# Patient Record
Sex: MALE | Race: WHITE | NOT HISPANIC OR LATINO
[De-identification: names, ages, dates, MRNs, and addresses within clinical notes are randomized per-mention and may not be internally consistent; named-entity substitution may affect disease eponyms.]

---

## 2017-07-19 ENCOUNTER — APPOINTMENT (OUTPATIENT)
Dept: INTERNAL MEDICINE | Facility: CLINIC | Age: 82
End: 2017-07-19

## 2017-08-24 ENCOUNTER — TRANSCRIPTION ENCOUNTER (OUTPATIENT)
Age: 82
End: 2017-08-24

## 2017-10-08 ENCOUNTER — EMERGENCY (EMERGENCY)
Facility: HOSPITAL | Age: 82
LOS: 1 days | Discharge: HOME | End: 2017-10-08
Attending: EMERGENCY MEDICINE
Payer: MEDICARE

## 2017-10-08 ENCOUNTER — TRANSCRIPTION ENCOUNTER (OUTPATIENT)
Age: 82
End: 2017-10-08

## 2017-10-08 VITALS
RESPIRATION RATE: 18 BRPM | OXYGEN SATURATION: 98 % | TEMPERATURE: 94 F | HEART RATE: 74 BPM | DIASTOLIC BLOOD PRESSURE: 80 MMHG | SYSTOLIC BLOOD PRESSURE: 174 MMHG

## 2017-10-08 VITALS
OXYGEN SATURATION: 100 % | TEMPERATURE: 98 F | WEIGHT: 160.06 LBS | DIASTOLIC BLOOD PRESSURE: 74 MMHG | HEART RATE: 89 BPM | HEIGHT: 69 IN | SYSTOLIC BLOOD PRESSURE: 168 MMHG | RESPIRATION RATE: 18 BRPM

## 2017-10-08 DIAGNOSIS — Z95.2 PRESENCE OF PROSTHETIC HEART VALVE: Chronic | ICD-10-CM

## 2017-10-08 DIAGNOSIS — Z98.49 CATARACT EXTRACTION STATUS, UNSPECIFIED EYE: Chronic | ICD-10-CM

## 2017-10-08 LAB
ANION GAP SERPL CALC-SCNC: 14 MMOL/L — SIGNIFICANT CHANGE UP (ref 5–17)
APPEARANCE UR: CLEAR — SIGNIFICANT CHANGE UP
BILIRUB UR-MCNC: NEGATIVE — SIGNIFICANT CHANGE UP
BUN SERPL-MCNC: 19 MG/DL — SIGNIFICANT CHANGE UP (ref 8–20)
CALCIUM SERPL-MCNC: 8.9 MG/DL — SIGNIFICANT CHANGE UP (ref 8.6–10.2)
CHLORIDE SERPL-SCNC: 89 MMOL/L — LOW (ref 98–107)
CO2 SERPL-SCNC: 26 MMOL/L — SIGNIFICANT CHANGE UP (ref 22–29)
COLOR SPEC: YELLOW — SIGNIFICANT CHANGE UP
CREAT SERPL-MCNC: 0.77 MG/DL — SIGNIFICANT CHANGE UP (ref 0.5–1.3)
DIFF PNL FLD: NEGATIVE — SIGNIFICANT CHANGE UP
GLUCOSE SERPL-MCNC: 142 MG/DL — HIGH (ref 70–115)
GLUCOSE UR QL: NEGATIVE MG/DL — SIGNIFICANT CHANGE UP
HCT VFR BLD CALC: 40.6 % — LOW (ref 42–52)
HGB BLD-MCNC: 13.2 G/DL — LOW (ref 14–18)
KETONES UR-MCNC: NEGATIVE — SIGNIFICANT CHANGE UP
LEUKOCYTE ESTERASE UR-ACNC: NEGATIVE — SIGNIFICANT CHANGE UP
MCHC RBC-ENTMCNC: 28 PG — SIGNIFICANT CHANGE UP (ref 27–31)
MCHC RBC-ENTMCNC: 32.5 G/DL — SIGNIFICANT CHANGE UP (ref 32–36)
MCV RBC AUTO: 86 FL — SIGNIFICANT CHANGE UP (ref 80–94)
NITRITE UR-MCNC: NEGATIVE — SIGNIFICANT CHANGE UP
PH UR: 7 — SIGNIFICANT CHANGE UP (ref 5–8)
PLATELET # BLD AUTO: 204 K/UL — SIGNIFICANT CHANGE UP (ref 150–400)
POTASSIUM SERPL-MCNC: 4.7 MMOL/L — SIGNIFICANT CHANGE UP (ref 3.5–5.3)
POTASSIUM SERPL-SCNC: 4.7 MMOL/L — SIGNIFICANT CHANGE UP (ref 3.5–5.3)
PROT UR-MCNC: NEGATIVE MG/DL — SIGNIFICANT CHANGE UP
RBC # BLD: 4.72 M/UL — SIGNIFICANT CHANGE UP (ref 4.6–6.2)
RBC # FLD: 13.8 % — SIGNIFICANT CHANGE UP (ref 11–15.6)
RBC CASTS # UR COMP ASSIST: NEGATIVE /HPF — SIGNIFICANT CHANGE UP (ref 0–4)
SODIUM SERPL-SCNC: 129 MMOL/L — LOW (ref 135–145)
SP GR SPEC: 1.01 — SIGNIFICANT CHANGE UP (ref 1.01–1.02)
UROBILINOGEN FLD QL: NEGATIVE MG/DL — SIGNIFICANT CHANGE UP
WBC # BLD: 7.9 K/UL — SIGNIFICANT CHANGE UP (ref 4.8–10.8)
WBC # FLD AUTO: 7.9 K/UL — SIGNIFICANT CHANGE UP (ref 4.8–10.8)
WBC UR QL: NEGATIVE — SIGNIFICANT CHANGE UP

## 2017-10-08 PROCEDURE — 36415 COLL VENOUS BLD VENIPUNCTURE: CPT

## 2017-10-08 PROCEDURE — 99283 EMERGENCY DEPT VISIT LOW MDM: CPT | Mod: 25

## 2017-10-08 PROCEDURE — 81001 URINALYSIS AUTO W/SCOPE: CPT

## 2017-10-08 PROCEDURE — 74020: CPT | Mod: 26

## 2017-10-08 PROCEDURE — 85027 COMPLETE CBC AUTOMATED: CPT

## 2017-10-08 PROCEDURE — 99284 EMERGENCY DEPT VISIT MOD MDM: CPT

## 2017-10-08 PROCEDURE — 80048 BASIC METABOLIC PNL TOTAL CA: CPT

## 2017-10-08 PROCEDURE — 74020: CPT

## 2017-10-08 RX ORDER — SODIUM CHLORIDE 9 MG/ML
3 INJECTION INTRAMUSCULAR; INTRAVENOUS; SUBCUTANEOUS ONCE
Qty: 0 | Refills: 0 | Status: DISCONTINUED | OUTPATIENT
Start: 2017-10-08 | End: 2017-10-12

## 2017-10-08 NOTE — ED ADULT TRIAGE NOTE - CHIEF COMPLAINT QUOTE
Sent from [facility] for abdominal pain and constipation. r/o obstruction. Family reports giving suppository this morning and reports small BM. Denies nausea and vomiting at current moment in time. Denies CP and SOB. Denies fevers and chills.

## 2017-10-08 NOTE — ED PROVIDER NOTE - SHIFT CHANGE DETAILS
ABDOMINAL PAIN. CONSTIPATION WITH PARTIAL RELIEF AFTER SUPPOSITORY AT HOME. ALSO DECREASED URINE OUTPUT SECONDARY TO RETENTION. AWAITING LAB RESULTS. XRAYS NEG OBSTRUCTION.  DISPO. PROB DX URINE RETENTION , NEEDS LEG BAG AND UROLOGY F/U

## 2017-10-08 NOTE — ED ADULT NURSE NOTE - PMH
Anemia    Aortic stenosis    BPH (benign prostatic hyperplasia)    Depression    Endocarditis    Fatigue    GI bleed    HLD (hyperlipidemia)

## 2017-10-08 NOTE — ED PROVIDER NOTE - MEDICAL DECISION MAKING DETAILS
ABDOMINAL PAIN STARTED THIS MORNING. NO VOMITING OR DIARRHEA. + CONSTIPATION AND DECREASED URINATION. FOLLOWING LABS AND CLINICAL STATUS ABDOMINAL PAIN STARTED THIS MORNING. NO VOMITING OR DIARRHEA. + CONSTIPATION AND DECREASED URINATION. FOLLOWING LABS AND CLINICAL STATUS  IMPROVED AS PER DR BHANU SAENZ/VENESSA WILEY TOMORROW

## 2017-10-08 NOTE — ED PROVIDER NOTE - CHPI ED SYMPTOMS NEG
no blood in stool/no abdominal distension/no burning urination/no chills/no fever/no dysuria/no diarrhea

## 2017-10-08 NOTE — ED ADULT NURSE NOTE - CHIEF COMPLAINT QUOTE
Sent from [facility] for abdominal pain and constipation. r/o obstruction. Family reports giving suppository this morning and reports small BM. Denies nausea and vomiting at current moment in time. Denies CP and SOB. Denies fevers and chills.

## 2017-10-08 NOTE — ED PROVIDER NOTE - OBJECTIVE STATEMENT
PT C/O SUDDEN ONSET ABDOMINAL PAIN THIS MORNING. HAS HAD SOME CONSTIPATION. SOME RELIEF WITH A SUPPOSITORY. ALSO TROUBLE PASSING URINE AND HISTORY OF PROSTATE ISSUES. NO VOMITING. NO FEVER.

## 2017-10-08 NOTE — ED ADULT NURSE NOTE - OBJECTIVE STATEMENT
Assumed patient care at 1605.  Pt reports lower abdominal pain since 0700 today.  Last BM friday morning.  Abdomen soft, tenderness present to LLQ. Hypoactive bowel sounds.  Pt denies nausea at time of assessment.

## 2017-10-08 NOTE — ED ADULT NURSE NOTE - PSH
S/P cataract extraction and insertion of intraocular lens  bilat  S/P TAVR (transcatheter aortic valve replacement)

## 2017-10-26 ENCOUNTER — APPOINTMENT (OUTPATIENT)
Dept: UROLOGY | Facility: CLINIC | Age: 82
End: 2017-10-26
Payer: MEDICARE

## 2017-10-26 VITALS
WEIGHT: 158 LBS | SYSTOLIC BLOOD PRESSURE: 159 MMHG | HEART RATE: 61 BPM | BODY MASS INDEX: 25.39 KG/M2 | DIASTOLIC BLOOD PRESSURE: 67 MMHG | HEIGHT: 66 IN | TEMPERATURE: 97.9 F | RESPIRATION RATE: 17 BRPM

## 2017-10-26 PROCEDURE — 99214 OFFICE O/P EST MOD 30 MIN: CPT

## 2017-11-03 ENCOUNTER — OUTPATIENT (OUTPATIENT)
Dept: OUTPATIENT SERVICES | Facility: HOSPITAL | Age: 82
LOS: 1 days | End: 2017-11-03
Payer: MEDICARE

## 2017-11-03 VITALS
RESPIRATION RATE: 16 BRPM | HEIGHT: 66 IN | HEART RATE: 63 BPM | SYSTOLIC BLOOD PRESSURE: 120 MMHG | TEMPERATURE: 98 F | DIASTOLIC BLOOD PRESSURE: 62 MMHG | WEIGHT: 160.06 LBS

## 2017-11-03 DIAGNOSIS — I35.0 NONRHEUMATIC AORTIC (VALVE) STENOSIS: ICD-10-CM

## 2017-11-03 DIAGNOSIS — Z98.49 CATARACT EXTRACTION STATUS, UNSPECIFIED EYE: Chronic | ICD-10-CM

## 2017-11-03 DIAGNOSIS — N40.1 BENIGN PROSTATIC HYPERPLASIA WITH LOWER URINARY TRACT SYMPTOMS: ICD-10-CM

## 2017-11-03 DIAGNOSIS — N40.0 BENIGN PROSTATIC HYPERPLASIA WITHOUT LOWER URINARY TRACT SYMPTOMS: ICD-10-CM

## 2017-11-03 DIAGNOSIS — Z95.2 PRESENCE OF PROSTHETIC HEART VALVE: Chronic | ICD-10-CM

## 2017-11-03 LAB
APPEARANCE UR: SIGNIFICANT CHANGE UP
BILIRUB UR-MCNC: NEGATIVE — SIGNIFICANT CHANGE UP
BLD GP AB SCN SERPL QL: NEGATIVE — SIGNIFICANT CHANGE UP
BLOOD UR QL VISUAL: HIGH
BUN SERPL-MCNC: 21 MG/DL — SIGNIFICANT CHANGE UP (ref 7–23)
CALCIUM SERPL-MCNC: 8.3 MG/DL — LOW (ref 8.4–10.5)
CHLORIDE SERPL-SCNC: 94 MMOL/L — LOW (ref 98–107)
CO2 SERPL-SCNC: 27 MMOL/L — SIGNIFICANT CHANGE UP (ref 22–31)
COLOR SPEC: YELLOW — SIGNIFICANT CHANGE UP
CREAT SERPL-MCNC: 0.93 MG/DL — SIGNIFICANT CHANGE UP (ref 0.5–1.3)
GLUCOSE SERPL-MCNC: 128 MG/DL — HIGH (ref 70–99)
GLUCOSE UR-MCNC: NEGATIVE — SIGNIFICANT CHANGE UP
HCT VFR BLD CALC: 37.1 % — LOW (ref 39–50)
HGB BLD-MCNC: 12.2 G/DL — LOW (ref 13–17)
HYALINE CASTS # UR AUTO: SIGNIFICANT CHANGE UP (ref 0–?)
KETONES UR-MCNC: NEGATIVE — SIGNIFICANT CHANGE UP
LEUKOCYTE ESTERASE UR-ACNC: HIGH
MCHC RBC-ENTMCNC: 29 PG — SIGNIFICANT CHANGE UP (ref 27–34)
MCHC RBC-ENTMCNC: 32.9 % — SIGNIFICANT CHANGE UP (ref 32–36)
MCV RBC AUTO: 88.1 FL — SIGNIFICANT CHANGE UP (ref 80–100)
MUCOUS THREADS # UR AUTO: SIGNIFICANT CHANGE UP
NITRITE UR-MCNC: NEGATIVE — SIGNIFICANT CHANGE UP
NON-SQ EPI CELLS # UR AUTO: 2 — SIGNIFICANT CHANGE UP
NRBC # FLD: 0 — SIGNIFICANT CHANGE UP
PH UR: 6 — SIGNIFICANT CHANGE UP (ref 4.6–8)
PLATELET # BLD AUTO: 187 K/UL — SIGNIFICANT CHANGE UP (ref 150–400)
PMV BLD: 8.7 FL — SIGNIFICANT CHANGE UP (ref 7–13)
POTASSIUM SERPL-MCNC: 4.5 MMOL/L — SIGNIFICANT CHANGE UP (ref 3.5–5.3)
POTASSIUM SERPL-SCNC: 4.5 MMOL/L — SIGNIFICANT CHANGE UP (ref 3.5–5.3)
PROT UR-MCNC: 100 — SIGNIFICANT CHANGE UP
RBC # BLD: 4.21 M/UL — SIGNIFICANT CHANGE UP (ref 4.2–5.8)
RBC # FLD: 13.4 % — SIGNIFICANT CHANGE UP (ref 10.3–14.5)
RBC CASTS # UR COMP ASSIST: HIGH (ref 0–?)
RH IG SCN BLD-IMP: NEGATIVE — SIGNIFICANT CHANGE UP
SODIUM SERPL-SCNC: 134 MMOL/L — LOW (ref 135–145)
SP GR SPEC: 1.02 — SIGNIFICANT CHANGE UP (ref 1–1.03)
SQUAMOUS # UR AUTO: SIGNIFICANT CHANGE UP
UROBILINOGEN FLD QL: NORMAL E.U. — SIGNIFICANT CHANGE UP (ref 0.1–0.2)
WBC # BLD: 5.47 K/UL — SIGNIFICANT CHANGE UP (ref 3.8–10.5)
WBC # FLD AUTO: 5.47 K/UL — SIGNIFICANT CHANGE UP (ref 3.8–10.5)
WBC UR QL: >50 — HIGH (ref 0–?)

## 2017-11-03 PROCEDURE — 93010 ELECTROCARDIOGRAM REPORT: CPT

## 2017-11-03 RX ORDER — ASPIRIN/CALCIUM CARB/MAGNESIUM 324 MG
1 TABLET ORAL
Qty: 0 | Refills: 0 | COMMUNITY

## 2017-11-03 RX ORDER — MULTIVIT-MIN/FERROUS GLUCONATE 9 MG/15 ML
1 LIQUID (ML) ORAL
Qty: 0 | Refills: 0 | COMMUNITY

## 2017-11-03 RX ORDER — SODIUM CHLORIDE 9 MG/ML
1000 INJECTION, SOLUTION INTRAVENOUS
Qty: 0 | Refills: 0 | Status: DISCONTINUED | OUTPATIENT
Start: 2017-11-17 | End: 2017-11-17

## 2017-11-03 RX ORDER — MULTIVIT-MIN/FERROUS GLUCONATE 9 MG/15 ML
0 LIQUID (ML) ORAL
Qty: 0 | Refills: 0 | COMMUNITY

## 2017-11-03 RX ORDER — METOPROLOL TARTRATE 50 MG
12.5 TABLET ORAL
Qty: 0 | Refills: 1 | COMMUNITY

## 2017-11-03 NOTE — H&P PST ADULT - ASSESSMENT
89 yrs old male with h/o BPH and retention of urine, went to [facility] ER on Oct 2017 and Waddell's catheter was inserted, attempted to remove waddell twice but unable to do so. Pt presents for preop eval to have cystoscopy, and transurethral button vaporization of the prostate on 11/17/17

## 2017-11-03 NOTE — H&P PST ADULT - PROBLEM SELECTOR PLAN 1
Scheduled for cystoscopy, and transurethral button vaporization of the prostate on 11/17/17.  Labs pending,  EKG in chart,   Preop instructions provided to pt. Famotidine provided to pt with instructions.

## 2017-11-03 NOTE — H&P PST ADULT - HISTORY OF PRESENT ILLNESS
89 yrs old male with h/o BPH and retention of urine, went to [facility] ER on Oct 2017 and Waddell's catheter was inserted, attempted to remove waddell twice but unable to do so. Pt presents for preop eval to have cystoscopy, and transurethral button vaporization of the prostate on 11/17/17

## 2017-11-03 NOTE — H&P PST ADULT - PROBLEM SELECTOR PLAN 2
Pt had TAVR in 2016 and pt is on aspirin, Pt advised to continue aspirin by Dr. Barnes.  Cardiac clearance done by Dr. Hernandez - will obtain copy

## 2017-11-03 NOTE — H&P PST ADULT - MUSCULOSKELETAL
details… detailed exam no joint swelling/needs cane for  ambulation/decreased ROM/diminished strength/ROM intact

## 2017-11-03 NOTE — H&P PST ADULT - PMH
Anemia    Aortic stenosis    BPH (benign prostatic hyperplasia)    Depression    Endocarditis  2013  Fatigue    GI bleed    HLD (hyperlipidemia)    Macular degeneration    Narcolepsy    UTI (urinary tract infection)  2013

## 2017-11-03 NOTE — H&P PST ADULT - GENERAL GENITOURINARY SYMPTOMS
unable to void in Oct 2017 and went to ER; pt had waddell to leg bag since then/urinary hesitancy/incontinence

## 2017-11-05 LAB
BACTERIA UR CULT: SIGNIFICANT CHANGE UP
SPECIMEN SOURCE: SIGNIFICANT CHANGE UP

## 2017-11-07 ENCOUNTER — MESSAGE (OUTPATIENT)
Age: 82
End: 2017-11-07

## 2017-11-09 ENCOUNTER — APPOINTMENT (OUTPATIENT)
Dept: INTERNAL MEDICINE | Facility: CLINIC | Age: 82
End: 2017-11-09

## 2017-11-16 RX ORDER — SODIUM CHLORIDE 9 MG/ML
1000 INJECTION, SOLUTION INTRAVENOUS
Qty: 0 | Refills: 0 | Status: DISCONTINUED | OUTPATIENT
Start: 2017-11-17 | End: 2017-11-17

## 2017-11-16 NOTE — ASU PATIENT PROFILE, ADULT - VISION (WITH CORRECTIVE LENSES IF THE PATIENT USUALLY WEARS THEM):
uses eye glasses/Partially impaired: cannot see medication labels or newsprint, but can see obstacles in path, and the surrounding layout; can count fingers at arm's length

## 2017-11-17 ENCOUNTER — TRANSCRIPTION ENCOUNTER (OUTPATIENT)
Age: 82
End: 2017-11-17

## 2017-11-17 ENCOUNTER — INPATIENT (INPATIENT)
Facility: HOSPITAL | Age: 82
LOS: 1 days | Discharge: HOME | End: 2017-11-19
Attending: UROLOGY | Admitting: UROLOGY

## 2017-11-17 ENCOUNTER — APPOINTMENT (OUTPATIENT)
Dept: UROLOGY | Facility: HOSPITAL | Age: 82
End: 2017-11-17
Payer: MEDICARE

## 2017-11-17 VITALS
OXYGEN SATURATION: 95 % | DIASTOLIC BLOOD PRESSURE: 66 MMHG | RESPIRATION RATE: 16 BRPM | HEIGHT: 66 IN | TEMPERATURE: 98 F | SYSTOLIC BLOOD PRESSURE: 145 MMHG | HEART RATE: 55 BPM | WEIGHT: 160.06 LBS

## 2017-11-17 DIAGNOSIS — Z95.2 PRESENCE OF PROSTHETIC HEART VALVE: Chronic | ICD-10-CM

## 2017-11-17 DIAGNOSIS — Z98.49 CATARACT EXTRACTION STATUS, UNSPECIFIED EYE: Chronic | ICD-10-CM

## 2017-11-17 DIAGNOSIS — N40.1 BENIGN PROSTATIC HYPERPLASIA WITH LOWER URINARY TRACT SYMPTOMS: ICD-10-CM

## 2017-11-17 PROCEDURE — 52317 REMOVE BLADDER STONE: CPT

## 2017-11-17 PROCEDURE — 52601 PROSTATECTOMY (TURP): CPT

## 2017-11-17 RX ORDER — MODAFINIL 200 MG/1
200 TABLET ORAL DAILY
Qty: 0 | Refills: 0 | Status: DISCONTINUED | OUTPATIENT
Start: 2017-11-17 | End: 2017-11-19

## 2017-11-17 RX ORDER — ONDANSETRON 8 MG/1
4 TABLET, FILM COATED ORAL ONCE
Qty: 0 | Refills: 0 | Status: DISCONTINUED | OUTPATIENT
Start: 2017-11-17 | End: 2017-11-17

## 2017-11-17 RX ORDER — SODIUM CHLORIDE 9 MG/ML
1000 INJECTION, SOLUTION INTRAVENOUS
Qty: 0 | Refills: 0 | Status: DISCONTINUED | OUTPATIENT
Start: 2017-11-17 | End: 2017-11-19

## 2017-11-17 RX ORDER — LABETALOL HCL 100 MG
10 TABLET ORAL ONCE
Qty: 0 | Refills: 0 | Status: COMPLETED | OUTPATIENT
Start: 2017-11-17 | End: 2017-11-17

## 2017-11-17 RX ORDER — OXYCODONE AND ACETAMINOPHEN 5; 325 MG/1; MG/1
1 TABLET ORAL EVERY 4 HOURS
Qty: 0 | Refills: 0 | Status: DISCONTINUED | OUTPATIENT
Start: 2017-11-17 | End: 2017-11-19

## 2017-11-17 RX ORDER — HEPARIN SODIUM 5000 [USP'U]/ML
5000 INJECTION INTRAVENOUS; SUBCUTANEOUS EVERY 8 HOURS
Qty: 0 | Refills: 0 | Status: DISCONTINUED | OUTPATIENT
Start: 2017-11-17 | End: 2017-11-19

## 2017-11-17 RX ORDER — PIPERACILLIN AND TAZOBACTAM 4; .5 G/20ML; G/20ML
3.38 INJECTION, POWDER, LYOPHILIZED, FOR SOLUTION INTRAVENOUS EVERY 8 HOURS
Qty: 0 | Refills: 0 | Status: DISCONTINUED | OUTPATIENT
Start: 2017-11-17 | End: 2017-11-19

## 2017-11-17 RX ORDER — METOPROLOL TARTRATE 50 MG
25 TABLET ORAL
Qty: 0 | Refills: 0 | Status: DISCONTINUED | OUTPATIENT
Start: 2017-11-17 | End: 2017-11-19

## 2017-11-17 RX ORDER — HYDROMORPHONE HYDROCHLORIDE 2 MG/ML
0.25 INJECTION INTRAMUSCULAR; INTRAVENOUS; SUBCUTANEOUS
Qty: 0 | Refills: 0 | Status: DISCONTINUED | OUTPATIENT
Start: 2017-11-17 | End: 2017-11-17

## 2017-11-17 RX ORDER — ACETAMINOPHEN 500 MG
650 TABLET ORAL EVERY 6 HOURS
Qty: 0 | Refills: 0 | Status: DISCONTINUED | OUTPATIENT
Start: 2017-11-17 | End: 2017-11-19

## 2017-11-17 RX ORDER — ATROPA BELLADONNA AND OPIUM 16.2; 6 MG/1; MG/1
1 SUPPOSITORY RECTAL ONCE
Qty: 0 | Refills: 0 | Status: DISCONTINUED | OUTPATIENT
Start: 2017-11-17 | End: 2017-11-17

## 2017-11-17 RX ORDER — SENNA PLUS 8.6 MG/1
1 TABLET ORAL AT BEDTIME
Qty: 0 | Refills: 0 | Status: DISCONTINUED | OUTPATIENT
Start: 2017-11-17 | End: 2017-11-19

## 2017-11-17 RX ORDER — DOCUSATE SODIUM 100 MG
100 CAPSULE ORAL THREE TIMES A DAY
Qty: 0 | Refills: 0 | Status: DISCONTINUED | OUTPATIENT
Start: 2017-11-17 | End: 2017-11-19

## 2017-11-17 RX ORDER — OXYCODONE AND ACETAMINOPHEN 5; 325 MG/1; MG/1
2 TABLET ORAL EVERY 6 HOURS
Qty: 0 | Refills: 0 | Status: DISCONTINUED | OUTPATIENT
Start: 2017-11-17 | End: 2017-11-19

## 2017-11-17 RX ORDER — FINASTERIDE 5 MG/1
5 TABLET, FILM COATED ORAL DAILY
Qty: 0 | Refills: 0 | Status: DISCONTINUED | OUTPATIENT
Start: 2017-11-17 | End: 2017-11-19

## 2017-11-17 RX ORDER — INFLUENZA VIRUS VACCINE 15; 15; 15; 15 UG/.5ML; UG/.5ML; UG/.5ML; UG/.5ML
0.5 SUSPENSION INTRAMUSCULAR ONCE
Qty: 0 | Refills: 0 | Status: COMPLETED | OUTPATIENT
Start: 2017-11-17 | End: 2017-11-19

## 2017-11-17 RX ADMIN — Medication 10 MILLIGRAM(S): at 11:07

## 2017-11-17 RX ADMIN — PIPERACILLIN AND TAZOBACTAM 25 GRAM(S): 4; .5 INJECTION, POWDER, LYOPHILIZED, FOR SOLUTION INTRAVENOUS at 21:38

## 2017-11-17 RX ADMIN — MODAFINIL 200 MILLIGRAM(S): 200 TABLET ORAL at 14:19

## 2017-11-17 RX ADMIN — HEPARIN SODIUM 5000 UNIT(S): 5000 INJECTION INTRAVENOUS; SUBCUTANEOUS at 21:38

## 2017-11-17 RX ADMIN — ATROPA BELLADONNA AND OPIUM 1 SUPPOSITORY(S): 16.2; 6 SUPPOSITORY RECTAL at 17:35

## 2017-11-17 RX ADMIN — HEPARIN SODIUM 5000 UNIT(S): 5000 INJECTION INTRAVENOUS; SUBCUTANEOUS at 14:20

## 2017-11-17 RX ADMIN — PIPERACILLIN AND TAZOBACTAM 25 GRAM(S): 4; .5 INJECTION, POWDER, LYOPHILIZED, FOR SOLUTION INTRAVENOUS at 14:25

## 2017-11-17 RX ADMIN — Medication 25 MILLIGRAM(S): at 17:35

## 2017-11-17 RX ADMIN — Medication 100 MILLIGRAM(S): at 21:38

## 2017-11-17 RX ADMIN — SENNA PLUS 1 TABLET(S): 8.6 TABLET ORAL at 21:38

## 2017-11-17 RX ADMIN — Medication 100 MILLIGRAM(S): at 14:19

## 2017-11-17 RX ADMIN — FINASTERIDE 5 MILLIGRAM(S): 5 TABLET, FILM COATED ORAL at 14:19

## 2017-11-17 NOTE — BRIEF OPERATIVE NOTE - PROCEDURE
<<-----Click on this checkbox to enter Procedure Cystoscopy, with TURP, using button electrode  11/17/2017    Active  ANG5  Cystolitholapaxy  11/17/2017    Active  ANG5

## 2017-11-17 NOTE — ASU PREOP CHECKLIST - COMMENTS
famotidine lopressor flomax metoprolol  at 4 30 am famotidine lopressor flomax metoprolol proscar at 4 30 am

## 2017-11-17 NOTE — PROGRESS NOTE ADULT - SUBJECTIVE AND OBJECTIVE BOX
Post op Check    Pt seen and examined without complaints. Pain is controlled. Denies SOB/CP/N/V.     Vital Signs Last 24 Hrs  T(C): 36.5 (17 Nov 2017 13:05), Max: 37 (17 Nov 2017 10:15)  T(F): 97.7 (17 Nov 2017 13:05), Max: 98.6 (17 Nov 2017 10:15)  HR: 64 (17 Nov 2017 13:05) (55 - 70)  BP: 147/66 (17 Nov 2017 13:05) (145/66 - 174/69)  BP(mean): --  RR: 16 (17 Nov 2017 13:05) (12 - 17)  SpO2: 95% (17 Nov 2017 13:05) (95% - 99%)    I&O's Summary      Physical Exam  Gen: NAD, A&Ox3  Abd: Soft, NT, ND  : +waddell draining clear with occasional transient red, on moderate gtt CBI. +traction                 A/P: 89y Male s/p TURP and cystolitholapaxy   DVT prophylaxis/OOB  Incentive spirometry  CBI overnight  d/c traction tonight   continue abx  Analgesia and antiemetics as needed  regular Diet  AM labs

## 2017-11-18 DIAGNOSIS — N40.0 BENIGN PROSTATIC HYPERPLASIA WITHOUT LOWER URINARY TRACT SYMPTOMS: ICD-10-CM

## 2017-11-18 LAB
BACTERIA UR CULT: SIGNIFICANT CHANGE UP
SPECIMEN SOURCE: SIGNIFICANT CHANGE UP

## 2017-11-18 RX ADMIN — PIPERACILLIN AND TAZOBACTAM 25 GRAM(S): 4; .5 INJECTION, POWDER, LYOPHILIZED, FOR SOLUTION INTRAVENOUS at 06:12

## 2017-11-18 RX ADMIN — Medication 100 MILLIGRAM(S): at 06:13

## 2017-11-18 RX ADMIN — Medication 25 MILLIGRAM(S): at 18:18

## 2017-11-18 RX ADMIN — Medication 25 MILLIGRAM(S): at 06:13

## 2017-11-18 RX ADMIN — HEPARIN SODIUM 5000 UNIT(S): 5000 INJECTION INTRAVENOUS; SUBCUTANEOUS at 14:58

## 2017-11-18 RX ADMIN — PIPERACILLIN AND TAZOBACTAM 25 GRAM(S): 4; .5 INJECTION, POWDER, LYOPHILIZED, FOR SOLUTION INTRAVENOUS at 14:57

## 2017-11-18 RX ADMIN — MODAFINIL 200 MILLIGRAM(S): 200 TABLET ORAL at 12:56

## 2017-11-18 RX ADMIN — FINASTERIDE 5 MILLIGRAM(S): 5 TABLET, FILM COATED ORAL at 12:52

## 2017-11-18 RX ADMIN — Medication 100 MILLIGRAM(S): at 14:58

## 2017-11-18 RX ADMIN — SENNA PLUS 1 TABLET(S): 8.6 TABLET ORAL at 21:28

## 2017-11-18 RX ADMIN — Medication 100 MILLIGRAM(S): at 21:28

## 2017-11-18 RX ADMIN — HEPARIN SODIUM 5000 UNIT(S): 5000 INJECTION INTRAVENOUS; SUBCUTANEOUS at 21:28

## 2017-11-18 RX ADMIN — SODIUM CHLORIDE 75 MILLILITER(S): 9 INJECTION, SOLUTION INTRAVENOUS at 21:28

## 2017-11-18 RX ADMIN — PIPERACILLIN AND TAZOBACTAM 25 GRAM(S): 4; .5 INJECTION, POWDER, LYOPHILIZED, FOR SOLUTION INTRAVENOUS at 21:28

## 2017-11-18 RX ADMIN — HEPARIN SODIUM 5000 UNIT(S): 5000 INJECTION INTRAVENOUS; SUBCUTANEOUS at 06:12

## 2017-11-18 NOTE — PROGRESS NOTE ADULT - SUBJECTIVE AND OBJECTIVE BOX
STATUS POST: button vaporization prostate. Cystolithalopaxy     POST OPERATIVE DAY #: 1    SUBJECTIVE:  Flatus: [x ] YES [ ] NO             Bowel Movement: [ ] YES [ x] NO  Pain (0-10):     5       Pain Control Adequate: [ x] YES [ ] NO  Nausea: [ ] YES [x ] NO            Vomiting: [ ] YES [x ] NO  Diarrhea: [ ] YES [x ] NO         Constipation: [ ] YES [x] NO     Chest Pain: [ ] YES [x] NO    SOB:  [ ] YES [x ] NO    MEDICATIONS  (STANDING):  docusate sodium 100 milliGRAM(s) Oral three times a day  finasteride 5 milliGRAM(s) Oral daily  heparin  Injectable 5000 Unit(s) SubCutaneous every 8 hours  influenza   Vaccine 0.5 milliLiter(s) IntraMuscular once  lactated ringers. 1000 milliLiter(s) (75 mL/Hr) IV Continuous <Continuous>  metoprolol     tartrate 25 milliGRAM(s) Oral two times a day  modafinil 200 milliGRAM(s) Oral daily  piperacillin/tazobactam IVPB. 3.375 Gram(s) IV Intermittent every 8 hours  senna 1 Tablet(s) Oral at bedtime    MEDICATIONS  (PRN):  acetaminophen   Tablet. 650 milliGRAM(s) Oral every 6 hours PRN Mild Pain (1 - 3)  oxyCODONE    5 mG/acetaminophen 325 mG 1 Tablet(s) Oral every 4 hours PRN Moderate Pain  oxyCODONE    5 mG/acetaminophen 325 mG 2 Tablet(s) Oral every 6 hours PRN Severe Pain      Vital Signs Last 24 Hrs  T(C): 36.5 (18 Nov 2017 06:07), Max: 37 (17 Nov 2017 10:15)  T(F): 97.7 (18 Nov 2017 06:07), Max: 98.6 (17 Nov 2017 10:15)  HR: 63 (18 Nov 2017 06:07) (61 - 73)  BP: 159/58 (18 Nov 2017 06:07) (114/57 - 174/69)  BP(mean): --  RR: 17 (18 Nov 2017 06:07) (12 - 17)  SpO2: 96% (18 Nov 2017 06:07) (95% - 99%)    PHYSICAL EXAM:    Appears in NAD  ABD; Soft NT ND    LE's warm    CBI Clear.

## 2017-11-18 NOTE — PROGRESS NOTE ADULT - ASSESSMENT
89 M S/P button vaporization prostate. Cystolithalopaxy   CBI clear    decrease CBI and if remains clear, will DC CBI and DC home with MILIAN with follow up to out pt UROLOGIST

## 2017-11-19 ENCOUNTER — TRANSCRIPTION ENCOUNTER (OUTPATIENT)
Age: 82
End: 2017-11-19

## 2017-11-19 VITALS
RESPIRATION RATE: 18 BRPM | TEMPERATURE: 99 F | HEART RATE: 60 BPM | SYSTOLIC BLOOD PRESSURE: 144 MMHG | OXYGEN SATURATION: 99 % | DIASTOLIC BLOOD PRESSURE: 43 MMHG

## 2017-11-19 RX ORDER — MOXIFLOXACIN HYDROCHLORIDE TABLETS, 400 MG 400 MG/1
1 TABLET, FILM COATED ORAL
Qty: 6 | Refills: 0 | OUTPATIENT
Start: 2017-11-19 | End: 2017-11-22

## 2017-11-19 RX ORDER — ONDANSETRON 8 MG/1
1 TABLET, FILM COATED ORAL
Qty: 24 | Refills: 0 | OUTPATIENT
Start: 2017-11-19 | End: 2017-11-23

## 2017-11-19 RX ORDER — DOCUSATE SODIUM 100 MG
1 CAPSULE ORAL
Qty: 0 | Refills: 0 | COMMUNITY
Start: 2017-11-19

## 2017-11-19 RX ORDER — ACETAMINOPHEN 500 MG
2 TABLET ORAL
Qty: 0 | Refills: 0 | COMMUNITY
Start: 2017-11-19

## 2017-11-19 RX ORDER — ACETAMINOPHEN 500 MG
1 TABLET ORAL
Qty: 30 | Refills: 0 | OUTPATIENT
Start: 2017-11-19 | End: 2017-11-24

## 2017-11-19 RX ORDER — SENNA PLUS 8.6 MG/1
1 TABLET ORAL
Qty: 0 | Refills: 0 | COMMUNITY
Start: 2017-11-19

## 2017-11-19 RX ADMIN — Medication 100 MILLIGRAM(S): at 05:33

## 2017-11-19 RX ADMIN — FINASTERIDE 5 MILLIGRAM(S): 5 TABLET, FILM COATED ORAL at 11:15

## 2017-11-19 RX ADMIN — HEPARIN SODIUM 5000 UNIT(S): 5000 INJECTION INTRAVENOUS; SUBCUTANEOUS at 05:33

## 2017-11-19 RX ADMIN — Medication 10 MILLIGRAM(S): at 11:30

## 2017-11-19 RX ADMIN — Medication 25 MILLIGRAM(S): at 05:33

## 2017-11-19 RX ADMIN — INFLUENZA VIRUS VACCINE 0.5 MILLILITER(S): 15; 15; 15; 15 SUSPENSION INTRAMUSCULAR at 11:15

## 2017-11-19 RX ADMIN — Medication 100 MILLIGRAM(S): at 11:15

## 2017-11-19 RX ADMIN — MODAFINIL 200 MILLIGRAM(S): 200 TABLET ORAL at 12:58

## 2017-11-19 RX ADMIN — PIPERACILLIN AND TAZOBACTAM 25 GRAM(S): 4; .5 INJECTION, POWDER, LYOPHILIZED, FOR SOLUTION INTRAVENOUS at 05:33

## 2017-11-19 NOTE — DISCHARGE NOTE ADULT - PLAN OF CARE
return to daily living activity prior to surgery, postoperative recovery ACTIVITY: No heavy lifting or straining. Otherwise, you may return to your usual level of physical activity. If you are taking narcotic pain medication (such as Percocet), do NOT drive a car, operate machinery or make important decisions.  DIET: Return to your usual diet.  NOTIFY YOUR SURGEON IF: You have any fever (over 100.4 F) or chills, persistent nausea/vomiting, persistent diarrhea, or if your pain is not controlled on your discharge pain medications.  FOLLOW-UP:  1. Follow-up with your urologist early this week for waddell removal.  Please call office for appointment ACTIVITY: No heavy lifting or straining. Otherwise, you may return to your usual level of physical activity. If you are taking narcotic pain medication (such as Percocet), do NOT drive a car, operate machinery or make important decisions.  DIET: Return to your usual diet.  NOTIFY YOUR SURGEON IF: You have any fever (over 100.4 F) or chills, persistent nausea/vomiting, persistent diarrhea, or if your pain is not controlled on your discharge pain medications.  FOLLOW-UP:  1. Follow-up with your urologist early this week for waddell removal.  Please call office for appointment. If needed at any time, can follow-up with Dr. Barnes at the office listed above ACTIVITY: No heavy lifting or straining. Otherwise, you may return to your usual level of physical activity. If you are taking narcotic pain medication (such as Percocet), do NOT drive a car, operate machinery or make important decisions.  DIET: Return to your usual diet.  NOTIFY YOUR SURGEON IF: You have any fever (over 100.4 F) or chills, persistent nausea/vomiting, persistent diarrhea, or if your pain is not controlled on your discharge pain medications. If you have any dizziness, shortness of breath, or have bright red urine that does not clear, visit the emergency room or if mild call [phone] to discuss with a doctor or schedule an appointment  FOLLOW-UP:  1. Follow-up with your urologist early this week for waddell removal.  Please call office for appointment. If needed at any time, can follow-up with Dr. Barnes at the office listed above

## 2017-11-19 NOTE — DISCHARGE NOTE ADULT - PATIENT PORTAL LINK FT
“You can access the [employer] Patient Portal, offered by [facility], by registering with the following website: [email or web address]”

## 2017-11-19 NOTE — DISCHARGE NOTE ADULT - HOSPITAL COURSE
89 yrs old male with h/o BPH and retention of urine, went to [facility] ER on Oct 2017 and Waddell's catheter was inserted, attempted to remove waddell twice but unable to do so. Presented for  transurethral button vaporization of the prostate on 11/17/17.

## 2017-11-19 NOTE — DISCHARGE NOTE ADULT - INSTRUCTIONS
No changes in diet, drink plenty of fluids to keep up good urine output. Drink plenty of fluids, especially water. Empty Yu bag as needed; switch to leg bag as needed.

## 2017-11-19 NOTE — PROGRESS NOTE ADULT - SUBJECTIVE AND OBJECTIVE BOX
Subjective  No overnight events, pt comfortable this AM. No fevers, chills, n/v.    Objective    Vital signs  T(F): , Max: 98.9 (11-18-17 @ 16:55)  HR: 67 (11-19-17 @ 05:31)  BP: 151/52 (11-19-17 @ 05:31)  SpO2: 94% (11-19-17 @ 05:31)  Wt(kg): --    Output     11-18 @ 07:01  -  11-19 @ 07:00  --------------------------------------------------------  IN: 0 mL / OUT: 1800 mL / NET: -1800 mL        Gen: NAD  Abd: soft, NT, ND  : CBI clamped - waddell output pink

## 2017-11-19 NOTE — DISCHARGE NOTE ADULT - CARE PLAN
Principal Discharge DX:	BPH (benign prostatic hyperplasia)  Goal:	return to daily living activity prior to surgery, postoperative recovery  Instructions for follow-up, activity and diet:	ACTIVITY: No heavy lifting or straining. Otherwise, you may return to your usual level of physical activity. If you are taking narcotic pain medication (such as Percocet), do NOT drive a car, operate machinery or make important decisions.  DIET: Return to your usual diet.  NOTIFY YOUR SURGEON IF: You have any fever (over 100.4 F) or chills, persistent nausea/vomiting, persistent diarrhea, or if your pain is not controlled on your discharge pain medications.  FOLLOW-UP:  1. Follow-up with your urologist early this week for waddell removal.  Please call office for appointment Principal Discharge DX:	BPH (benign prostatic hyperplasia)  Goal:	return to daily living activity prior to surgery, postoperative recovery  Instructions for follow-up, activity and diet:	ACTIVITY: No heavy lifting or straining. Otherwise, you may return to your usual level of physical activity. If you are taking narcotic pain medication (such as Percocet), do NOT drive a car, operate machinery or make important decisions.  DIET: Return to your usual diet.  NOTIFY YOUR SURGEON IF: You have any fever (over 100.4 F) or chills, persistent nausea/vomiting, persistent diarrhea, or if your pain is not controlled on your discharge pain medications.  FOLLOW-UP:  1. Follow-up with your urologist early this week for waddell removal.  Please call office for appointment. If needed at any time, can follow-up with Dr. Barnes at the office listed above Principal Discharge DX:	BPH (benign prostatic hyperplasia)  Goal:	return to daily living activity prior to surgery, postoperative recovery  Instructions for follow-up, activity and diet:	ACTIVITY: No heavy lifting or straining. Otherwise, you may return to your usual level of physical activity. If you are taking narcotic pain medication (such as Percocet), do NOT drive a car, operate machinery or make important decisions.  DIET: Return to your usual diet.  NOTIFY YOUR SURGEON IF: You have any fever (over 100.4 F) or chills, persistent nausea/vomiting, persistent diarrhea, or if your pain is not controlled on your discharge pain medications. If you have any dizziness, shortness of breath, or have bright red urine that does not clear, visit the emergency room or if mild call [phone] to discuss with a doctor or schedule an appointment  FOLLOW-UP:  1. Follow-up with your urologist early this week for waddell removal.  Please call office for appointment. If needed at any time, can follow-up with Dr. Barnes at the office listed above

## 2017-11-19 NOTE — DISCHARGE NOTE ADULT - CONDITIONS AT DISCHARGE
Vital signs are stable. Patient is alert and oriented. Ambulatory with cane. Yu to leg bag in place, draining pink-tinged urine in plentiful amounts. Denies pain. Accompanied by son.

## 2017-11-19 NOTE — DISCHARGE NOTE ADULT - MEDICATION SUMMARY - MEDICATIONS TO TAKE
I will START or STAY ON the medications listed below when I get home from the hospital:    Proscar 5 mg oral tablet  -- 1 tab(s) by mouth once a day in am  -- Indication: For Prostate    acetaminophen 325 mg oral tablet  -- 2 tab(s) by mouth every 6 hours, As needed, Mild Pain (1 - 3)  -- Indication: For pain    Aspir 81 oral delayed release tablet  -- 1 tab(s) by mouth once a day in am  -- Indication: For CAD    Flomax 0.4 mg oral capsule  -- 0.4 milligram(s) by mouth 2 times a day  -- Indication: For prostate    Lexapro 20 mg oral tablet  -- 1 tab(s) by mouth once a day in am  -- Indication: For home medication    Metoprolol Tartrate 25 mg oral tablet  -- 1 tab(s) by mouth 2 times a day  -- Indication: For home medication    Provigil 200 mg oral tablet  -- 1 tab(s) by mouth once a day in am  -- Indication: For home medication    Saw Palmetto oral capsule  -- 1 cap(s) by mouth 2 times a day. Last dose 11/10/17  -- Indication: For prostate    senna oral tablet  -- 1 tab(s) by mouth once a day (at bedtime)  -- Indication: For stool softener, as needed    docusate sodium 100 mg oral capsule  -- 1 cap(s) by mouth 3 times a day  -- Indication: For stool softener, as needed    CoQ10  -- 100 milligram(s) by mouth once a day in am. Last dose 11/10/17  -- Indication: For vitamin    Melatonin  -- 6 milligram(s) by mouth once a day (at bedtime). Last dose 11/10/17  -- Indication: For sleep aid    multivitamin  -- 1 cap(s) by mouth once a day in am. Last dose 11/10/17  -- Indication: For vitamin    PreserVision AREDS 2 oral capsule  -- 1 cap(s) by mouth 2 times a day. Last dose 11/10/17  -- Indication: For home medication    Vitamin D3 2000 intl units oral tablet  -- 1 tab(s) by mouth once a day in am  -- Indication: For vitamin I will START or STAY ON the medications listed below when I get home from the hospital:    Proscar 5 mg oral tablet  -- 1 tab(s) by mouth once a day in am  -- Indication: For Prostate    Aspir 81 oral delayed release tablet  -- 1 tab(s) by mouth once a day in am  -- Indication: For CAD    Flomax 0.4 mg oral capsule  -- 0.4 milligram(s) by mouth 2 times a day  -- Indication: For prostate    Lexapro 20 mg oral tablet  -- 1 tab(s) by mouth once a day in am  -- Indication: For home medication    Metoprolol Tartrate 25 mg oral tablet  -- 1 tab(s) by mouth 2 times a day  -- Indication: For home medication    Provigil 200 mg oral tablet  -- 1 tab(s) by mouth once a day in am  -- Indication: For home medication    Saw Palmetto oral capsule  -- 1 cap(s) by mouth 2 times a day. Last dose 11/10/17  -- Indication: For prostate    senna oral tablet  -- 1 tab(s) by mouth once a day (at bedtime)  -- Indication: For stool softener, as needed    docusate sodium 100 mg oral capsule  -- 1 cap(s) by mouth 3 times a day  -- Indication: For stool softener, as needed    CoQ10  -- 100 milligram(s) by mouth once a day in am. Last dose 11/10/17  -- Indication: For vitamin    Melatonin  -- 6 milligram(s) by mouth once a day (at bedtime). Last dose 11/10/17  -- Indication: For sleep aid    multivitamin  -- 1 cap(s) by mouth once a day in am. Last dose 11/10/17  -- Indication: For vitamin    PreserVision AREDS 2 oral capsule  -- 1 cap(s) by mouth 2 times a day. Last dose 11/10/17  -- Indication: For home medication    Vitamin D3 2000 intl units oral tablet  -- 1 tab(s) by mouth once a day in am  -- Indication: For vitamin

## 2017-11-19 NOTE — PROGRESS NOTE ADULT - ASSESSMENT
89 M S/P button vaporization prostate. Cystolithalopaxy   -- d/c w/o antbiotics  -- outpatient To w/private urologist

## 2017-11-22 LAB — STONE ANALYSIS-IMP: SIGNIFICANT CHANGE UP

## 2017-11-24 ENCOUNTER — INPATIENT (INPATIENT)
Facility: HOSPITAL | Age: 82
LOS: 8 days | Discharge: HOME | DRG: 920 | End: 2017-12-03
Attending: INTERNAL MEDICINE | Admitting: HOSPITALIST
Payer: MEDICARE

## 2017-11-24 VITALS
HEART RATE: 69 BPM | OXYGEN SATURATION: 97 % | TEMPERATURE: 99 F | WEIGHT: 164.91 LBS | SYSTOLIC BLOOD PRESSURE: 135 MMHG | DIASTOLIC BLOOD PRESSURE: 71 MMHG | HEIGHT: 71 IN | RESPIRATION RATE: 16 BRPM

## 2017-11-24 DIAGNOSIS — Z98.49 CATARACT EXTRACTION STATUS, UNSPECIFIED EYE: Chronic | ICD-10-CM

## 2017-11-24 DIAGNOSIS — Z95.2 PRESENCE OF PROSTHETIC HEART VALVE: Chronic | ICD-10-CM

## 2017-11-24 LAB
ALBUMIN SERPL ELPH-MCNC: 4 G/DL — SIGNIFICANT CHANGE UP (ref 3.3–5.2)
ALP SERPL-CCNC: 49 U/L — SIGNIFICANT CHANGE UP (ref 40–120)
ALT FLD-CCNC: 15 U/L — SIGNIFICANT CHANGE UP
ANION GAP SERPL CALC-SCNC: 13 MMOL/L — SIGNIFICANT CHANGE UP (ref 5–17)
APPEARANCE UR: SIGNIFICANT CHANGE UP
APTT BLD: 32 SEC — SIGNIFICANT CHANGE UP (ref 27.5–37.4)
AST SERPL-CCNC: 24 U/L — SIGNIFICANT CHANGE UP
BACTERIA # UR AUTO: ABNORMAL
BASOPHILS # BLD AUTO: 0 K/UL — SIGNIFICANT CHANGE UP (ref 0–0.2)
BASOPHILS NFR BLD AUTO: 0.4 % — SIGNIFICANT CHANGE UP (ref 0–2)
BILIRUB SERPL-MCNC: 0.3 MG/DL — LOW (ref 0.4–2)
BILIRUB UR-MCNC: NEGATIVE — SIGNIFICANT CHANGE UP
BUN SERPL-MCNC: 17 MG/DL — SIGNIFICANT CHANGE UP (ref 8–20)
CALCIUM SERPL-MCNC: 8.9 MG/DL — SIGNIFICANT CHANGE UP (ref 8.6–10.2)
CHLORIDE SERPL-SCNC: 95 MMOL/L — LOW (ref 98–107)
CO2 SERPL-SCNC: 27 MMOL/L — SIGNIFICANT CHANGE UP (ref 22–29)
COLOR SPEC: ABNORMAL
CREAT SERPL-MCNC: 0.85 MG/DL — SIGNIFICANT CHANGE UP (ref 0.5–1.3)
DIFF PNL FLD: ABNORMAL
EOSINOPHIL # BLD AUTO: 0.2 K/UL — SIGNIFICANT CHANGE UP (ref 0–0.5)
EOSINOPHIL NFR BLD AUTO: 3.3 % — SIGNIFICANT CHANGE UP (ref 0–6)
EPI CELLS # UR: SIGNIFICANT CHANGE UP
GLUCOSE SERPL-MCNC: 104 MG/DL — SIGNIFICANT CHANGE UP (ref 70–115)
GLUCOSE UR QL: NEGATIVE — SIGNIFICANT CHANGE UP
HCT VFR BLD CALC: 34.9 % — LOW (ref 42–52)
HGB BLD-MCNC: 11.4 G/DL — LOW (ref 14–18)
INR BLD: 1.03 RATIO — SIGNIFICANT CHANGE UP (ref 0.88–1.16)
KETONES UR-MCNC: ABNORMAL
LACTATE BLDV-MCNC: 0.8 MMOL/L — SIGNIFICANT CHANGE UP (ref 0.5–2)
LEUKOCYTE ESTERASE UR-ACNC: ABNORMAL
LYMPHOCYTES # BLD AUTO: 1.3 K/UL — SIGNIFICANT CHANGE UP (ref 1–4.8)
LYMPHOCYTES # BLD AUTO: 24.1 % — SIGNIFICANT CHANGE UP (ref 20–55)
MAGNESIUM SERPL-MCNC: 2.2 MG/DL — SIGNIFICANT CHANGE UP (ref 1.6–2.6)
MCHC RBC-ENTMCNC: 28.3 PG — SIGNIFICANT CHANGE UP (ref 27–31)
MCHC RBC-ENTMCNC: 32.7 G/DL — SIGNIFICANT CHANGE UP (ref 32–36)
MCV RBC AUTO: 86.6 FL — SIGNIFICANT CHANGE UP (ref 80–94)
MONOCYTES # BLD AUTO: 0.6 K/UL — SIGNIFICANT CHANGE UP (ref 0–0.8)
MONOCYTES NFR BLD AUTO: 10.3 % — HIGH (ref 3–10)
NEUTROPHILS # BLD AUTO: 3.4 K/UL — SIGNIFICANT CHANGE UP (ref 1.8–8)
NEUTROPHILS NFR BLD AUTO: 61.7 % — SIGNIFICANT CHANGE UP (ref 37–73)
NITRITE UR-MCNC: NEGATIVE — SIGNIFICANT CHANGE UP
PH UR: 6.5 — SIGNIFICANT CHANGE UP (ref 5–8)
PLATELET # BLD AUTO: 246 K/UL — SIGNIFICANT CHANGE UP (ref 150–400)
POTASSIUM SERPL-MCNC: 4.9 MMOL/L — SIGNIFICANT CHANGE UP (ref 3.5–5.3)
POTASSIUM SERPL-SCNC: 4.9 MMOL/L — SIGNIFICANT CHANGE UP (ref 3.5–5.3)
PROT SERPL-MCNC: 6.8 G/DL — SIGNIFICANT CHANGE UP (ref 6.6–8.7)
PROT UR-MCNC: 500 MG/DL
PROTHROM AB SERPL-ACNC: 11.3 SEC — SIGNIFICANT CHANGE UP (ref 9.8–12.7)
RBC # BLD: 4.03 M/UL — LOW (ref 4.6–6.2)
RBC # FLD: 13.7 % — SIGNIFICANT CHANGE UP (ref 11–15.6)
RBC CASTS # UR COMP ASSIST: >50 /HPF (ref 0–4)
SODIUM SERPL-SCNC: 135 MMOL/L — SIGNIFICANT CHANGE UP (ref 135–145)
SP GR SPEC: 1.02 — SIGNIFICANT CHANGE UP (ref 1.01–1.02)
UROBILINOGEN FLD QL: NEGATIVE — SIGNIFICANT CHANGE UP
WBC # BLD: 5.5 K/UL — SIGNIFICANT CHANGE UP (ref 4.8–10.8)
WBC # FLD AUTO: 5.5 K/UL — SIGNIFICANT CHANGE UP (ref 4.8–10.8)
WBC UR QL: SIGNIFICANT CHANGE UP

## 2017-11-24 PROCEDURE — 76857 US EXAM PELVIC LIMITED: CPT | Mod: 26

## 2017-11-24 RX ORDER — SODIUM CHLORIDE 9 MG/ML
1000 INJECTION INTRAMUSCULAR; INTRAVENOUS; SUBCUTANEOUS
Qty: 0 | Refills: 0 | Status: DISCONTINUED | OUTPATIENT
Start: 2017-11-24 | End: 2017-11-25

## 2017-11-24 RX ADMIN — SODIUM CHLORIDE 100 MILLILITER(S): 9 INJECTION INTRAMUSCULAR; INTRAVENOUS; SUBCUTANEOUS at 21:36

## 2017-11-24 NOTE — ED PROVIDER NOTE - PHYSICAL EXAMINATION
Constitutinal :Appears comfortably, talking in full sentences  Head :NC AT , no swelling  Eyes :eomi, no swelling  Mouth :mm moist,  Neck : supple, trachea in midline  Chest :Meliton air entry, symm chest expansion, no distress  Heart :S1 S2 distant  Abdomen :abd soft, non tender  Musc/Skel :ext no swelling, no deformity, no spine tenderness, distal pilses present  Neuro  :AAO 3 no focal deficits Constitutinal :Appears comfortably, talking in full sentences  Head :NC AT , no swelling  Eyes :eomi, no swelling  Mouth :mm moist,  Neck : supple, trachea in midline  Chest :Meliton air entry, symm chest expansion, no distress  Heart :S1 S2 distant  Abdomen :abd soft, non tender. No flank pain.   : multiple areas of blood clots on groin and metus. No swollen or tender lymph nodes  Musc/Skel :ext no swelling, no deformity, no spine tenderness, distal pulses present  Neuro  :AAO 3 no focal deficits

## 2017-11-24 NOTE — ED ADULT NURSE REASSESSMENT NOTE - NS ED NURSE REASSESS COMMENT FT1
Assumed patient care from BRANDON Bennett, charting as noted. Received patient a&ox3, able to make needs known. Noted with bilateral hearing aids on ears and eye glasses. With ID band on right wrist with No Blood. As per patient he is a Religious.  Patient denies any pain or discomfort at this time. Vss stable. Patient reports no urgency to urinate at this time but had episodes of hematuria before. Plan of care and wait time explained, patient verbalized understanding. Patient not in respiratory distress. To continue to monitor.

## 2017-11-24 NOTE — ED PROVIDER NOTE - MEDICAL DECISION MAKING DETAILS
89 y/o with recent procedure, close f/u with  presents with blood in urine. Plan to do labs, fluids, and bedside bladder scan. Reevaluate.

## 2017-11-24 NOTE — ED ADULT TRIAGE NOTE - CHIEF COMPLAINT QUOTE
Reports having bladder procedure 5 days ago and presents with bleeding from penis. Denies new trauma or injury.

## 2017-11-24 NOTE — ED ADULT NURSE REASSESSMENT NOTE - NS ED NURSE REASSESS COMMENT FT1
Bladder scan done as per MD order. 141 cc. Dr. Tay aware, no FC insertion. Bladder scan done as per MD verbal order. 141 cc. Dr. Tay aware, says no FC insertion needed. Bladder scan done as per MD verbal order. 140 cc. Dr. Tay aware, says no FC insertion needed.

## 2017-11-24 NOTE — ED PROVIDER NOTE - NS ED ROS FT
no weight change, no fever or chills  no rash, no bruises  no visual changes no discharge  no cough cold or congestion,   no sob, no chest pain  no orthopnea, no pnd  no abd pain, no n/v/d  +hematuria, no change in urinary habits  no headache, no paresthesia  no previous psych evaluation

## 2017-11-24 NOTE — ED PROVIDER NOTE - OBJECTIVE STATEMENT
89 y/o M pt with PMHx of anemia, aortic valve stenosis, and BPH and PSHx of TAVR presents to ED c/o hematuria x1 day s/p TURP 5 days ago. As per son, pt had a Yu in for 2 days after procedure. He was seen at Dr. Bonner's 3 days ago to take out Yu and was able to void with no complications. As per son, today he experienced bright red blood dripping from urethra. Pt was given abx in the hospital but son unsure which. He lives with son and son reports he tolerated PO today. Pt denies fever, chills, CP, SOB, nausea, vomiting, abdominal pain, and back pain.

## 2017-11-24 NOTE — ED ADULT NURSE NOTE - OBJECTIVE STATEMENT
89 yom s/p TURP urinating bright red blood started 2 hours ago. catheter was taken out tuesday. abd soft nontender on palpation denies burning on urination denies dysuria. denies back pain denies fever/ chills, denies sob denies numbness/tingling. DEMETRIUS

## 2017-11-25 DIAGNOSIS — R31.0 GROSS HEMATURIA: ICD-10-CM

## 2017-11-25 DIAGNOSIS — Z29.9 ENCOUNTER FOR PROPHYLACTIC MEASURES, UNSPECIFIED: ICD-10-CM

## 2017-11-25 DIAGNOSIS — Z95.2 PRESENCE OF PROSTHETIC HEART VALVE: ICD-10-CM

## 2017-11-25 DIAGNOSIS — E78.5 HYPERLIPIDEMIA, UNSPECIFIED: ICD-10-CM

## 2017-11-25 DIAGNOSIS — D64.9 ANEMIA, UNSPECIFIED: ICD-10-CM

## 2017-11-25 DIAGNOSIS — R31.9 HEMATURIA, UNSPECIFIED: ICD-10-CM

## 2017-11-25 DIAGNOSIS — G47.419 NARCOLEPSY WITHOUT CATAPLEXY: ICD-10-CM

## 2017-11-25 DIAGNOSIS — N40.0 BENIGN PROSTATIC HYPERPLASIA WITHOUT LOWER URINARY TRACT SYMPTOMS: ICD-10-CM

## 2017-11-25 DIAGNOSIS — F32.9 MAJOR DEPRESSIVE DISORDER, SINGLE EPISODE, UNSPECIFIED: ICD-10-CM

## 2017-11-25 LAB
HCT VFR BLD CALC: 31.1 % — LOW (ref 42–52)
HCT VFR BLD CALC: 31.9 % — LOW (ref 42–52)
HGB BLD-MCNC: 10.3 G/DL — LOW (ref 14–18)
HGB BLD-MCNC: 10.7 G/DL — LOW (ref 14–18)
MCHC RBC-ENTMCNC: 28.5 PG — SIGNIFICANT CHANGE UP (ref 27–31)
MCHC RBC-ENTMCNC: 28.7 PG — SIGNIFICANT CHANGE UP (ref 27–31)
MCHC RBC-ENTMCNC: 33.1 G/DL — SIGNIFICANT CHANGE UP (ref 32–36)
MCHC RBC-ENTMCNC: 33.5 G/DL — SIGNIFICANT CHANGE UP (ref 32–36)
MCV RBC AUTO: 85.5 FL — SIGNIFICANT CHANGE UP (ref 80–94)
MCV RBC AUTO: 85.9 FL — SIGNIFICANT CHANGE UP (ref 80–94)
PLATELET # BLD AUTO: 180 K/UL — SIGNIFICANT CHANGE UP (ref 150–400)
PLATELET # BLD AUTO: 182 K/UL — SIGNIFICANT CHANGE UP (ref 150–400)
RBC # BLD: 3.62 M/UL — LOW (ref 4.6–6.2)
RBC # BLD: 3.73 M/UL — LOW (ref 4.6–6.2)
RBC # FLD: 13.9 % — SIGNIFICANT CHANGE UP (ref 11–15.6)
RBC # FLD: 14 % — SIGNIFICANT CHANGE UP (ref 11–15.6)
WBC # BLD: 4.8 K/UL — SIGNIFICANT CHANGE UP (ref 4.8–10.8)
WBC # BLD: 4.9 K/UL — SIGNIFICANT CHANGE UP (ref 4.8–10.8)
WBC # FLD AUTO: 4.8 K/UL — SIGNIFICANT CHANGE UP (ref 4.8–10.8)
WBC # FLD AUTO: 4.9 K/UL — SIGNIFICANT CHANGE UP (ref 4.8–10.8)

## 2017-11-25 PROCEDURE — 99285 EMERGENCY DEPT VISIT HI MDM: CPT

## 2017-11-25 PROCEDURE — 99223 1ST HOSP IP/OBS HIGH 75: CPT | Mod: GC

## 2017-11-25 RX ORDER — FINASTERIDE 5 MG/1
5 TABLET, FILM COATED ORAL DAILY
Qty: 0 | Refills: 0 | Status: DISCONTINUED | OUTPATIENT
Start: 2017-11-25 | End: 2017-12-03

## 2017-11-25 RX ORDER — MODAFINIL 200 MG/1
200 TABLET ORAL DAILY
Qty: 0 | Refills: 0 | Status: DISCONTINUED | OUTPATIENT
Start: 2017-11-25 | End: 2017-12-02

## 2017-11-25 RX ORDER — DOCUSATE SODIUM 100 MG
100 CAPSULE ORAL THREE TIMES A DAY
Qty: 0 | Refills: 0 | Status: DISCONTINUED | OUTPATIENT
Start: 2017-11-25 | End: 2017-12-03

## 2017-11-25 RX ORDER — FERROUS SULFATE 325(65) MG
325 TABLET ORAL
Qty: 0 | Refills: 0 | Status: DISCONTINUED | OUTPATIENT
Start: 2017-11-25 | End: 2017-12-03

## 2017-11-25 RX ORDER — TAMSULOSIN HYDROCHLORIDE 0.4 MG/1
0.4 CAPSULE ORAL AT BEDTIME
Qty: 0 | Refills: 0 | Status: DISCONTINUED | OUTPATIENT
Start: 2017-11-25 | End: 2017-12-03

## 2017-11-25 RX ORDER — ESCITALOPRAM OXALATE 10 MG/1
20 TABLET, FILM COATED ORAL DAILY
Qty: 0 | Refills: 0 | Status: DISCONTINUED | OUTPATIENT
Start: 2017-11-25 | End: 2017-12-03

## 2017-11-25 RX ORDER — ASCORBIC ACID 60 MG
250 TABLET,CHEWABLE ORAL
Qty: 0 | Refills: 0 | Status: DISCONTINUED | OUTPATIENT
Start: 2017-11-25 | End: 2017-12-03

## 2017-11-25 RX ORDER — METOPROLOL TARTRATE 50 MG
25 TABLET ORAL
Qty: 0 | Refills: 0 | Status: DISCONTINUED | OUTPATIENT
Start: 2017-11-25 | End: 2017-11-29

## 2017-11-25 RX ORDER — CEFTRIAXONE 500 MG/1
1 INJECTION, POWDER, FOR SOLUTION INTRAMUSCULAR; INTRAVENOUS EVERY 24 HOURS
Qty: 0 | Refills: 0 | Status: COMPLETED | OUTPATIENT
Start: 2017-11-25 | End: 2017-12-02

## 2017-11-25 RX ADMIN — Medication 25 MILLIGRAM(S): at 06:19

## 2017-11-25 RX ADMIN — Medication 325 MILLIGRAM(S): at 09:23

## 2017-11-25 RX ADMIN — CEFTRIAXONE 100 GRAM(S): 500 INJECTION, POWDER, FOR SOLUTION INTRAMUSCULAR; INTRAVENOUS at 13:32

## 2017-11-25 RX ADMIN — ESCITALOPRAM OXALATE 20 MILLIGRAM(S): 10 TABLET, FILM COATED ORAL at 13:30

## 2017-11-25 RX ADMIN — Medication 25 MILLIGRAM(S): at 16:32

## 2017-11-25 RX ADMIN — Medication 250 MILLIGRAM(S): at 16:32

## 2017-11-25 RX ADMIN — FINASTERIDE 5 MILLIGRAM(S): 5 TABLET, FILM COATED ORAL at 13:31

## 2017-11-25 RX ADMIN — Medication 325 MILLIGRAM(S): at 16:32

## 2017-11-25 RX ADMIN — TAMSULOSIN HYDROCHLORIDE 0.4 MILLIGRAM(S): 0.4 CAPSULE ORAL at 21:37

## 2017-11-25 RX ADMIN — Medication 250 MILLIGRAM(S): at 06:19

## 2017-11-25 RX ADMIN — SODIUM CHLORIDE 100 MILLILITER(S): 9 INJECTION INTRAMUSCULAR; INTRAVENOUS; SUBCUTANEOUS at 06:19

## 2017-11-25 RX ADMIN — MODAFINIL 200 MILLIGRAM(S): 200 TABLET ORAL at 13:35

## 2017-11-25 NOTE — PROGRESS NOTE ADULT - SUBJECTIVE AND OBJECTIVE BOX
Patient is a 89y old  Male who presents with a chief complaint of bloody urine , he is s/p TURP on    has waddell cold water irrigation now , clear urine in waddell , his son at the bedside     HPI:  89 y/o M pt with PMH of Anemia, aortic valve stenosis with TAVR, and BPH s/p cystolitholapaxy, button TURP on 17, now presents to ED c/o hematuria x1 day. Patient himself is a very poor historian, likely due to underlying dementia. As per son's discussion with ED physician, patient had a Waddell in for 2 days after procedure. He was seen at Dr. Hill 3 days ago to take out Waddell and was able to void with no complications. As per son, patient experienced bright red blood dripping from urethra starting last night along with passage of small clots. Patient lives with son and son reports he tolerated PO today. Patient is otherwise asymptomatic and denies any fever, chills, CP, SOB, nausea, vomiting, abdominal pain, back pain, or dysuria. (2017 02:46)      Allergies    No Known Allergies    Intolerances        REVIEW OF SYSTEMS:    as above otherwise negative   Vital Signs Last 24 Hrs  T(C): 36.8 (2017 08:09), Max: 37.1 (2017 18:25)  T(F): 98.3 (2017 08:09), Max: 98.7 (2017 18:25)  HR: 65 (2017 08:09) (62 - 72)  BP: 154/61 (2017 08:09) (130/72 - 163/69)  BP(mean): --  RR: 18 (2017 08:09) (16 - 20)  SpO2: 96% (2017 08:09) (86% - 98%)    PHYSICAL EXAM:    GENERAL: NAD, well-groomed, well-developed  CHEST/LUNG: Clear to percussion bilaterally; No rales, rhonchi, wheezing, or rubs  HEART: Regular rate and rhythm; No murmurs, rubs, or gallops  ABDOMEN: Soft, Nontender, Nondistended; Bowel sounds present  EXTREMITIES:  2+ Peripheral Pulses, No cyanosis, or edema  SKIN: No rashes or lesions      LABS:                        10.7   4.9   )-----------( 182      ( 2017 04:39 )             31.9     -    135  |  95<L>  |  17.0  ----------------------------<  104  4.9   |  27.0  |  0.85    Ca    8.9      2017 20:26  Mg     2.2     -    TPro  6.8  /  Alb  4.0  /  TBili  0.3<L>  /  DBili  x   /  AST  24  /  ALT  15  /  AlkPhos  49  -    PT/INR - ( 2017 20:26 )   PT: 11.3 sec;   INR: 1.03 ratio         PTT - ( 2017 20:26 )  PTT:32.0 sec  Urinalysis Basic - ( 2017 20:37 )    Color: Red / Appearance: Turbid / S.020 / pH: x  Gluc: x / Ketone: Trace  / Bili: Negative / Urobili: Negative   Blood: x / Protein: 500 mg/dL / Nitrite: Negative   Leuk Esterase: Small / RBC: >50 /HPF / WBC 3-5   Sq Epi: x / Non Sq Epi: Few / Bacteria: Occasional        RADIOLOGY & ADDITIONAL TESTS:

## 2017-11-25 NOTE — ED ADULT NURSE REASSESSMENT NOTE - NS ED NURSE REASSESS COMMENT FT1
Dr. Payton and Dr. Reese at bedside, inserted FC #[id number] with 700 cc bloody urine to urinary drainage bag, started CBI, patent.

## 2017-11-25 NOTE — H&P ADULT - NSHPOUTPATIENTPROVIDERS_GEN_ALL_CORE
Dr. Doyle - PCP - [phone]  Dr. Martinez - Cardiologist - [phone]  Urology: Dr. [facility] [phone]

## 2017-11-25 NOTE — CONSULT NOTE ADULT - SUBJECTIVE AND OBJECTIVE BOX
HPI:  89 y/o M pt with PMH of Anemia, aortic valve stenosis with TAVR, and BPH s/p cystolitholapaxy, button TURP on 17, now presents to ED c/o hematuria x1 day. Patient himself is a very poor historian, likely due to underlying dementia. As per son's discussion with ED physician, patient had a Waddell in for 2 days after procedure. He was seen at Dr. [facility] 3 days ago to take out Waddell and was able to void with no complications. As per son, patient experienced bright red blood dripping from urethra starting last night along with passage of small clots. Patient lives with son and son reports he tolerated PO today. Patient is otherwise asymptomatic and denies any fever, chills, CP, SOB, nausea, vomiting, abdominal pain, back pain, or dysuria. (2017 02:46)    Had hematuria and mild difficulty voiding.  Currently with a waddell in and cbi.  Patient is post op turp.      PAST MEDICAL & SURGICAL HISTORY:  Macular degeneration  UTI (urinary tract infection):   Narcolepsy  Aortic stenosis  GI bleed  HLD (hyperlipidemia)  Depression  Anemia  Endocarditis:   BPH (benign prostatic hyperplasia)  Fatigue  S/P TAVR (transcatheter aortic valve replacement)  S/P cataract extraction and insertion of intraocular lens: bilat      REVIEW OF SYSTEMS:    Reviewed h and p ros  MEDICATIONS  (STANDING):  ascorbic acid 250 milliGRAM(s) Oral two times a day  cefTRIAXone   IVPB 1 Gram(s) IV Intermittent every 24 hours  escitalopram 20 milliGRAM(s) Oral daily  ferrous    sulfate 325 milliGRAM(s) Oral two times a day with meals  finasteride 5 milliGRAM(s) Oral daily  metoprolol     tartrate 25 milliGRAM(s) Oral two times a day  modafinil 200 milliGRAM(s) Oral daily  tamsulosin 0.4 milliGRAM(s) Oral at bedtime    MEDICATIONS  (PRN):  docusate sodium 100 milliGRAM(s) Oral three times a day PRN Constipation      Allergies    No Known Allergies    Intolerances        SOCIAL HISTORY:    FAMILY HISTORY:  No pertinent family history in first degree relatives      Vital Signs Last 24 Hrs  T(C): 36.5 (2017 15:52), Max: 36.8 (2017 05:16)  T(F): 97.7 (2017 15:52), Max: 98.3 (2017 05:16)  HR: 63 (2017 15:52) (63 - 72)  BP: 156/54 (2017 18:36) (130/72 - 175/70)  BP(mean): --  RR: 18 (2017 15:52) (18 - 20)  SpO2: 96% (2017 15:52) (86% - 98%)    PHYSICAL EXAM:     abd- soft, nt, nd, bs+    CBi running well.        LABS:                        10.7   4.9   )-----------( 182      ( 2017 04:39 )             31.9     11-24    135  |  95<L>  |  17.0  ----------------------------<  104  4.9   |  27.0  |  0.85    Ca    8.9      2017 20:26  Mg     2.2     11-24    TPro  6.8  /  Alb  4.0  /  TBili  0.3<L>  /  DBili  x   /  AST  24  /  ALT  15  /  AlkPhos  49  11-24    PT/INR - ( 2017 20:26 )   PT: 11.3 sec;   INR: 1.03 ratio         PTT - ( 2017 20:26 )  PTT:32.0 sec  Urinalysis Basic - ( 2017 20:37 )    Color: Red / Appearance: Turbid / S.020 / pH: x  Gluc: x / Ketone: Trace  / Bili: Negative / Urobili: Negative   Blood: x / Protein: 500 mg/dL / Nitrite: Negative   Leuk Esterase: Small / RBC: >50 /HPF / WBC 3-5   Sq Epi: x / Non Sq Epi: Few / Bacteria: Occasional        RADIOLOGY & ADDITIONAL STUDIES:

## 2017-11-25 NOTE — H&P ADULT - PROBLEM SELECTOR PLAN 1
- patient s/p Cystolitholapaxy and button TURP on 11/17/17  - hematuria possibly from recent procedure, however given patient age and new-onset hematuria - patient s/p Cystolitholapaxy and button TURP on 11/17/17  - hematuria possibly from recent procedure, however given patient age and new-onset hematuria/page of clots, will admit for CBI  - Urology consulted - patient s/p Cystolitholapaxy and button TURP on 11/17/17  - hematuria possibly from recent procedure, patient with new-onset hematuria/passage of clots and urinary retention  - CBI, >700 cc of sanguinous urine removed on during initial waddell insertion  - Urology consulted - patient s/p Cystolitholapaxy and button TURP on 11/17/17  - hematuria possibly from recent procedure, UA showing large blood but no signs of clear infection, will f/u urine and blood cultures to r/o infection  - CBI, >700 cc of sanguinous urine removed on during initial waddell insertion  - Urology consulted

## 2017-11-25 NOTE — H&P ADULT - ASSESSMENT
89 y/o M pt with PMH of Anemia, Aortic valve stenosis with TAVR, and BPH s/p cystolitholapaxy, button TURP on 11/17/17, with removal of Yu catheter 3 days ago, now presents to ED c/o hematuria x1 day.

## 2017-11-25 NOTE — H&P ADULT - PROBLEM SELECTOR PLAN 4
- Will hold home ASA 81 mg given hematuria - Will hold home ASA 81 mg given hematuria  - continue home Metoprolol 25mg BID with parameters

## 2017-11-25 NOTE — ED ADULT NURSE REASSESSMENT NOTE - NS ED NURSE REASSESS COMMENT FT1
Dr. Payton ordered to hold off Yu insertion. Patient will be examined by her. No FC has been inserted.

## 2017-11-25 NOTE — H&P ADULT - NEUROLOGICAL DETAILS
sensation intact/deep reflexes intact/normal strength/AAOx2 to person and place, likely patient baseline/cranial nerves intact

## 2017-11-25 NOTE — CONSULT NOTE ADULT - PROBLEM SELECTOR RECOMMENDATION 9
titrate off cbi    daily cbc    Hematuria is normal and expected after urologic surgery    will follow up tomorrow

## 2017-11-25 NOTE — H&P ADULT - PROBLEM SELECTOR PLAN 3
- H/H is 10.3/30.1,  likely iron deficiency from acute blood loss  - start ferrous sulfate 325 mg BID with Vitamin C  - follow-up AM H/H

## 2017-11-25 NOTE — H&P ADULT - PROBLEM SELECTOR PLAN 2
- will continue patient home medications - will continue patient home medications, Flomax 0.4 mg and Proscar 5 mg  - patient s/p button TURP on 11/17/17

## 2017-11-25 NOTE — H&P ADULT - HISTORY OF PRESENT ILLNESS
89 y/o M pt with PMH of Anemia, aortic valve stenosis with TAVR, and BPH s/p cystolitholapaxy, button TURP on 11/17/17, now presents to ED c/o hematuria x1 day. Patient himself is a very poor historian, likely due to underlying dementia. As per son's discussion with ED physician, patient had a Yu in for 2 days after procedure. He was seen at Dr. [facility] 3 days ago to take out Yu and was able to void with no complications. As per son, patient experienced bright red blood dripping from urethra starting last night. Patient lives with son and son reports he tolerated PO today. Patient is otherwise asymptomatic and denies any fever, chills, CP, SOB, nausea, vomiting, abdominal pain, back pain, or dysuria. 89 y/o M pt with PMH of Anemia, aortic valve stenosis with TAVR, and BPH s/p cystolitholapaxy, button TURP on 11/17/17, now presents to ED c/o hematuria x1 day. Patient himself is a very poor historian, likely due to underlying dementia. As per son's discussion with ED physician, patient had a Yu in for 2 days after procedure. He was seen at Dr. [facility] 3 days ago to take out Yu and was able to void with no complications. As per son, patient experienced bright red blood dripping from urethra starting last night along with passage of small clots. Patient lives with son and son reports he tolerated PO today. Patient is otherwise asymptomatic and denies any fever, chills, CP, SOB, nausea, vomiting, abdominal pain, back pain, or dysuria.

## 2017-11-25 NOTE — H&P ADULT - NSHPPHYSICALEXAM_GEN_ALL_CORE
ICU Vital Signs Last 24 Hrs  T(C): 36.4 (24 Nov 2017 19:30), Max: 37.1 (24 Nov 2017 18:25)  T(F): 97.5 (24 Nov 2017 19:30), Max: 98.7 (24 Nov 2017 18:25)  HR: 62 (24 Nov 2017 19:30) (62 - 69)  BP: 140/70 (24 Nov 2017 19:30) (135/71 - 140/70)  RR: 19 (24 Nov 2017 19:30) (16 - 19)  SpO2: 95% (24 Nov 2017 19:30) (95% - 97%)

## 2017-11-26 DIAGNOSIS — I10 ESSENTIAL (PRIMARY) HYPERTENSION: ICD-10-CM

## 2017-11-26 LAB
HCT VFR BLD CALC: 37 % — LOW (ref 42–52)
HGB BLD-MCNC: 12.1 G/DL — LOW (ref 14–18)

## 2017-11-26 PROCEDURE — 99233 SBSQ HOSP IP/OBS HIGH 50: CPT

## 2017-11-26 RX ADMIN — Medication 325 MILLIGRAM(S): at 08:45

## 2017-11-26 RX ADMIN — Medication 250 MILLIGRAM(S): at 17:15

## 2017-11-26 RX ADMIN — Medication 325 MILLIGRAM(S): at 17:15

## 2017-11-26 RX ADMIN — Medication 25 MILLIGRAM(S): at 17:15

## 2017-11-26 RX ADMIN — Medication 250 MILLIGRAM(S): at 05:27

## 2017-11-26 RX ADMIN — Medication 25 MILLIGRAM(S): at 05:27

## 2017-11-26 RX ADMIN — TAMSULOSIN HYDROCHLORIDE 0.4 MILLIGRAM(S): 0.4 CAPSULE ORAL at 21:48

## 2017-11-26 RX ADMIN — FINASTERIDE 5 MILLIGRAM(S): 5 TABLET, FILM COATED ORAL at 12:38

## 2017-11-26 RX ADMIN — ESCITALOPRAM OXALATE 20 MILLIGRAM(S): 10 TABLET, FILM COATED ORAL at 12:38

## 2017-11-26 RX ADMIN — CEFTRIAXONE 100 GRAM(S): 500 INJECTION, POWDER, FOR SOLUTION INTRAMUSCULAR; INTRAVENOUS at 12:39

## 2017-11-26 RX ADMIN — MODAFINIL 200 MILLIGRAM(S): 200 TABLET ORAL at 12:37

## 2017-11-26 NOTE — PROGRESS NOTE ADULT - SUBJECTIVE AND OBJECTIVE BOX
Patient is a 89y old  Male who presents with a chief complaint of bloody urine ,on CBI waddell cath in place , clear urine in cathater   urology consult appreciated   Pt feels well denies any pain , no dyspnea   no overnight events reported   HPI:  89 y/o M pt with PMH of Anemia, aortic valve stenosis with TAVR, and BPH s/p cystolitholapaxy, button TURP on 11/17/17, now presents to ED c/o hematuria x1 day. Patient himself is a very poor historian, likely due to underlying dementia. As per son's discussion with ED physician, patient had a Waddell in for 2 days after procedure. He was seen at Dr. [facility] 3 days ago to take out Waddell and was able to void with no complications. As per son, patient experienced bright red blood dripping from urethra starting last night along with passage of small clots. Patient lives with son and son reports he tolerated PO today. Patient is otherwise asymptomatic and denies any fever, chills, CP, SOB, nausea, vomiting, abdominal pain, back pain, or dysuria. (25 Nov 2017 02:46)      Allergies    No Known Allergies    Intolerances        REVIEW OF SYSTEMS:    as above otherwise negative     Vital Signs Last 24 Hrs  T(C): 36.9 (26 Nov 2017 07:54), Max: 36.9 (26 Nov 2017 07:54)  T(F): 98.4 (26 Nov 2017 07:54), Max: 98.4 (26 Nov 2017 07:54)  HR: 57 (26 Nov 2017 07:54) (57 - 64)  BP: 162/73 (26 Nov 2017 07:54) (150/70 - 175/70)  BP(mean): --  RR: 18 (26 Nov 2017 07:54) (18 - 18)  SpO2: 95% (26 Nov 2017 07:54) (95% - 96%)  PHYSICAL EXAM:    GENERAL: NAD, well-groomed, well-developed  CHEST/LUNG: Clear to percussion bilaterally; No rales, rhonchi, wheezing, or rubs  HEART: Regular rate and rhythm; No murmurs, rubs, or gallops  ABDOMEN: Soft, Nontender, Nondistended; Bowel sounds present  EXTREMITIES:  2+ Peripheral Pulses, No cyanosis, or edema  SKIN: No rashes or lesions      LABS:                                            10.7   4.9   )-----------( 182      ( 25 Nov 2017 04:39 )             31.9   11-24    135  |  95<L>  |  17.0  ----------------------------<  104  4.9   |  27.0  |  0.85    Ca    8.9      24 Nov 2017 20:26  Mg     2.2     11-24    TPro  6.8  /  Alb  4.0  /  TBili  0.3<L>  /  DBili  x   /  AST  24  /  ALT  15  /  AlkPhos  49  11-24

## 2017-11-26 NOTE — PROGRESS NOTE ADULT - PROBLEM SELECTOR PLAN 1
improving ,   waddell in pace CBI as needed   continue flomax, empirical iv abx   urology on board improving , monitor HB closely   waddell in pace CBI as needed   continue flomax, empirical iv abx   urology on board

## 2017-11-26 NOTE — PROGRESS NOTE ADULT - SUBJECTIVE AND OBJECTIVE BOX
INTERVAL HPI/OVERNIGHT EVENTS: Resting comfortably with cbi.  Urine is clear.    MEDICATIONS  (STANDING):  ascorbic acid 250 milliGRAM(s) Oral two times a day  cefTRIAXone   IVPB 1 Gram(s) IV Intermittent every 24 hours  escitalopram 20 milliGRAM(s) Oral daily  ferrous    sulfate 325 milliGRAM(s) Oral two times a day with meals  finasteride 5 milliGRAM(s) Oral daily  metoprolol     tartrate 25 milliGRAM(s) Oral two times a day  modafinil 200 milliGRAM(s) Oral daily  tamsulosin 0.4 milliGRAM(s) Oral at bedtime    MEDICATIONS  (PRN):  docusate sodium 100 milliGRAM(s) Oral three times a day PRN Constipation      Allergies    No Known Allergies    Intolerances        Vital Signs Last 24 Hrs  T(C): 36.6 (2017 15:02), Max: 36.9 (2017 07:54)  T(F): 97.9 (2017 15:02), Max: 98.4 (2017 07:54)  HR: 64 (2017 15:02) (57 - 64)  BP: 164/62 (2017 18:55) (150/70 - 174/72)  BP(mean): --  RR: 18 (2017 15:02) (18 - 18)  SpO2: 97% (2017 15:02) (95% - 97%)     ON PE:  General: alert and awake  Abdomen: soft nt, nd, bs+       LABS:                        12.1   x     )-----------( x        ( 2017 09:28 )             37.0     11-24    135  |  95<L>  |  17.0  ----------------------------<  104  4.9   |  27.0  |  0.85    Ca    8.9      2017 20:26  Mg     2.2     11-24    TPro  6.8  /  Alb  4.0  /  TBili  0.3<L>  /  DBili  x   /  AST  24  /  ALT  15  /  AlkPhos  49  11-24    PT/INR - ( 2017 20:26 )   PT: 11.3 sec;   INR: 1.03 ratio         PTT - ( 2017 20:26 )  PTT:32.0 sec  Urinalysis Basic - ( 2017 20:37 )    Color: Red / Appearance: Turbid / S.020 / pH: x  Gluc: x / Ketone: Trace  / Bili: Negative / Urobili: Negative   Blood: x / Protein: 500 mg/dL / Nitrite: Negative   Leuk Esterase: Small / RBC: >50 /HPF / WBC 3-5   Sq Epi: x / Non Sq Epi: Few / Bacteria: Occasional        RADIOLOGY & ADDITIONAL TESTS:

## 2017-11-27 LAB
ANION GAP SERPL CALC-SCNC: 10 MMOL/L — SIGNIFICANT CHANGE UP (ref 5–17)
BUN SERPL-MCNC: 13 MG/DL — SIGNIFICANT CHANGE UP (ref 8–20)
CALCIUM SERPL-MCNC: 8.3 MG/DL — LOW (ref 8.6–10.2)
CHLORIDE SERPL-SCNC: 95 MMOL/L — LOW (ref 98–107)
CO2 SERPL-SCNC: 24 MMOL/L — SIGNIFICANT CHANGE UP (ref 22–29)
CREAT SERPL-MCNC: 0.63 MG/DL — SIGNIFICANT CHANGE UP (ref 0.5–1.3)
CULTURE RESULTS: SIGNIFICANT CHANGE UP
GLUCOSE SERPL-MCNC: 109 MG/DL — SIGNIFICANT CHANGE UP (ref 70–115)
HCT VFR BLD CALC: 38.7 % — LOW (ref 42–52)
HGB BLD-MCNC: 12.7 G/DL — LOW (ref 14–18)
MAGNESIUM SERPL-MCNC: 2 MG/DL — SIGNIFICANT CHANGE UP (ref 1.6–2.6)
POTASSIUM SERPL-MCNC: 4.3 MMOL/L — SIGNIFICANT CHANGE UP (ref 3.5–5.3)
POTASSIUM SERPL-SCNC: 4.3 MMOL/L — SIGNIFICANT CHANGE UP (ref 3.5–5.3)
SODIUM SERPL-SCNC: 129 MMOL/L — LOW (ref 135–145)
SPECIMEN SOURCE: SIGNIFICANT CHANGE UP

## 2017-11-27 PROCEDURE — 99233 SBSQ HOSP IP/OBS HIGH 50: CPT

## 2017-11-27 RX ORDER — LOSARTAN POTASSIUM 100 MG/1
25 TABLET, FILM COATED ORAL DAILY
Qty: 0 | Refills: 0 | Status: DISCONTINUED | OUTPATIENT
Start: 2017-11-27 | End: 2017-11-29

## 2017-11-27 RX ADMIN — CEFTRIAXONE 100 GRAM(S): 500 INJECTION, POWDER, FOR SOLUTION INTRAMUSCULAR; INTRAVENOUS at 12:51

## 2017-11-27 RX ADMIN — FINASTERIDE 5 MILLIGRAM(S): 5 TABLET, FILM COATED ORAL at 12:51

## 2017-11-27 RX ADMIN — Medication 25 MILLIGRAM(S): at 05:20

## 2017-11-27 RX ADMIN — TAMSULOSIN HYDROCHLORIDE 0.4 MILLIGRAM(S): 0.4 CAPSULE ORAL at 21:36

## 2017-11-27 RX ADMIN — Medication 250 MILLIGRAM(S): at 17:44

## 2017-11-27 RX ADMIN — MODAFINIL 200 MILLIGRAM(S): 200 TABLET ORAL at 12:51

## 2017-11-27 RX ADMIN — Medication 325 MILLIGRAM(S): at 07:50

## 2017-11-27 RX ADMIN — Medication 25 MILLIGRAM(S): at 17:44

## 2017-11-27 RX ADMIN — Medication 325 MILLIGRAM(S): at 17:44

## 2017-11-27 RX ADMIN — Medication 250 MILLIGRAM(S): at 05:19

## 2017-11-27 RX ADMIN — ESCITALOPRAM OXALATE 20 MILLIGRAM(S): 10 TABLET, FILM COATED ORAL at 12:51

## 2017-11-27 RX ADMIN — LOSARTAN POTASSIUM 25 MILLIGRAM(S): 100 TABLET, FILM COATED ORAL at 12:51

## 2017-11-27 NOTE — PROGRESS NOTE ADULT - PROBLEM SELECTOR PLAN 1
improving , will stop CBI and monitor   keep waddell    continue flomax, empirical iv abx   urology follow up appreciated

## 2017-11-27 NOTE — PROGRESS NOTE ADULT - SUBJECTIVE AND OBJECTIVE BOX
Patient is a 89y old  Male who presents with blood in the urine , asymptomatic denies any pain , urology follow up appreciated   discussed with nurse this am , CBI stopped yesterday the reinstated because of blood in the urine , thsi am clear urine will use CBI as needed       HPI :   89 y/o M pt with PMH of Anemia, aortic valve stenosis with TAVR, and BPH s/p cystolitholapaxy, button TURP on 11/17/17, now presents to ED c/o hematuria x1 day. Patient himself is a very poor historian, likely due to underlying dementia. As per son's discussion with ED physician, patient had a Yu in for 2 days after procedure. He was seen at Dr[facility] 3 days ago to take out Yu and was able to void with no complications. As per son, patient experienced bright red blood dripping from urethra starting last night along with passage of small clots. Patient lives with son and son reports he tolerated PO today. Patient is otherwise asymptomatic and denies any fever, chills, CP, SOB, nausea, vomiting, abdominal pain, back pain, or dysuria. (25 Nov 2017 02:46)      Allergies    No Known Allergies    Intolerances        REVIEW OF SYSTEMS:    as above otherwise negative     Vital Signs Last 24 Hrs  Vital Signs Last 24 Hrs  T(C): 36.8 (27 Nov 2017 07:35), Max: 36.9 (26 Nov 2017 23:56)  T(F): 98.2 (27 Nov 2017 07:35), Max: 98.5 (26 Nov 2017 23:56)  HR: 57 (27 Nov 2017 07:35) (57 - 68)  BP: 184/74 (27 Nov 2017 07:35) (150/60 - 184/74)  BP(mean): --  RR: 18 (27 Nov 2017 07:35) (18 - 18)  SpO2: 95% (27 Nov 2017 07:35) (94% - 97%)  GENERAL: NAD, well-groomed, well-developed  CHEST/LUNG: Clear to auscultation  bilaterally; No rales, rhonchi, wheezing, or rubs  HEART: Regular rate and rhythm; No murmurs, rubs, or gallops  ABDOMEN: Soft, Nontender, Nondistended; Bowel sounds present  EXTREMITIES:  2+ Peripheral Pulses, No cyanosis, or edema    LABS:                                                                12.7   x     )-----------( x        ( 27 Nov 2017 06:33 )             38.7   11-27    129<L>  |  95<L>  |  13.0  ----------------------------<  109  4.3   |  24.0  |  0.63    Ca    8.3<L>      27 Nov 2017 06:33  Mg     2.0     11-27

## 2017-11-27 NOTE — PROGRESS NOTE ADULT - SUBJECTIVE AND OBJECTIVE BOX
INTERVAL HPI/OVERNIGHT EVENTS: Patient without new complaints.      MEDICATIONS  (STANDING):  ascorbic acid 250 milliGRAM(s) Oral two times a day  cefTRIAXone   IVPB 1 Gram(s) IV Intermittent every 24 hours  escitalopram 20 milliGRAM(s) Oral daily  ferrous    sulfate 325 milliGRAM(s) Oral two times a day with meals  finasteride 5 milliGRAM(s) Oral daily  metoprolol     tartrate 25 milliGRAM(s) Oral two times a day  modafinil 200 milliGRAM(s) Oral daily  tamsulosin 0.4 milliGRAM(s) Oral at bedtime    MEDICATIONS  (PRN):  docusate sodium 100 milliGRAM(s) Oral three times a day PRN Constipation      Allergies    No Known Allergies    Intolerances        Vital Signs Last 24 Hrs  T(C): 36.8 (27 Nov 2017 07:35), Max: 36.9 (26 Nov 2017 23:56)  T(F): 98.2 (27 Nov 2017 07:35), Max: 98.5 (26 Nov 2017 23:56)  HR: 57 (27 Nov 2017 07:35) (57 - 68)  BP: 184/74 (27 Nov 2017 07:35) (150/60 - 184/74)  BP(mean): --  RR: 18 (27 Nov 2017 07:35) (18 - 18)  SpO2: 95% (27 Nov 2017 07:35) (94% - 97%)    ROS:  as per HPI     ON PE:  General: Well developed; well nourished; in no acute distress  Head: Normocephalic; atraumatic  Respiratory: No tachypnea  Gastrointestinal: Soft non-tender non-distended;  Genitourinary: No costovertebral angle tenderness.  Urinary bladder is clinically not distended  Yu draining clear on CBI.    Extremities: Normal range of motion, No edema  Neurological: Alert and oriented x4  Skin: Warm and dry. No acute rash  Psychiatric: Cooperative and appropriate      LABS:                        12.7   x     )-----------( x        ( 27 Nov 2017 06:33 )             38.7     11-27    129<L>  |  95<L>  |  13.0  ----------------------------<  109  4.3   |  24.0  |  0.63    Ca    8.3<L>      27 Nov 2017 06:33  Mg     2.0     11-27            RADIOLOGY & ADDITIONAL TESTS:

## 2017-11-27 NOTE — PROGRESS NOTE ADULT - ASSESSMENT
, HTN , BPH s/p TURP on 11/17 admitted for gross  hematuria, improving with waddell and CBI , urology on board

## 2017-11-28 LAB
ANION GAP SERPL CALC-SCNC: 9 MMOL/L — SIGNIFICANT CHANGE UP (ref 5–17)
BUN SERPL-MCNC: 13 MG/DL — SIGNIFICANT CHANGE UP (ref 8–20)
CALCIUM SERPL-MCNC: 8.4 MG/DL — LOW (ref 8.6–10.2)
CHLORIDE SERPL-SCNC: 93 MMOL/L — LOW (ref 98–107)
CO2 SERPL-SCNC: 29 MMOL/L — SIGNIFICANT CHANGE UP (ref 22–29)
CREAT SERPL-MCNC: 0.67 MG/DL — SIGNIFICANT CHANGE UP (ref 0.5–1.3)
GLUCOSE SERPL-MCNC: 112 MG/DL — SIGNIFICANT CHANGE UP (ref 70–115)
HCT VFR BLD CALC: 37.3 % — LOW (ref 42–52)
HGB BLD-MCNC: 12.2 G/DL — LOW (ref 14–18)
MAGNESIUM SERPL-MCNC: 2.1 MG/DL — SIGNIFICANT CHANGE UP (ref 1.6–2.6)
MCHC RBC-ENTMCNC: 28.6 PG — SIGNIFICANT CHANGE UP (ref 27–31)
MCHC RBC-ENTMCNC: 32.7 G/DL — SIGNIFICANT CHANGE UP (ref 32–36)
MCV RBC AUTO: 87.4 FL — SIGNIFICANT CHANGE UP (ref 80–94)
PHOSPHATE SERPL-MCNC: 3.4 MG/DL — SIGNIFICANT CHANGE UP (ref 2.4–4.7)
PLATELET # BLD AUTO: 260 K/UL — SIGNIFICANT CHANGE UP (ref 150–400)
POTASSIUM SERPL-MCNC: 4.3 MMOL/L — SIGNIFICANT CHANGE UP (ref 3.5–5.3)
POTASSIUM SERPL-SCNC: 4.3 MMOL/L — SIGNIFICANT CHANGE UP (ref 3.5–5.3)
RBC # BLD: 4.27 M/UL — LOW (ref 4.6–6.2)
RBC # FLD: 14 % — SIGNIFICANT CHANGE UP (ref 11–15.6)
SODIUM SERPL-SCNC: 131 MMOL/L — LOW (ref 135–145)
WBC # BLD: 7.2 K/UL — SIGNIFICANT CHANGE UP (ref 4.8–10.8)
WBC # FLD AUTO: 7.2 K/UL — SIGNIFICANT CHANGE UP (ref 4.8–10.8)

## 2017-11-28 PROCEDURE — 99233 SBSQ HOSP IP/OBS HIGH 50: CPT

## 2017-11-28 RX ORDER — SACCHAROMYCES BOULARDII 250 MG
250 POWDER IN PACKET (EA) ORAL
Qty: 0 | Refills: 0 | Status: DISCONTINUED | OUTPATIENT
Start: 2017-11-28 | End: 2017-12-03

## 2017-11-28 RX ORDER — SODIUM CHLORIDE 9 MG/ML
1000 INJECTION INTRAMUSCULAR; INTRAVENOUS; SUBCUTANEOUS
Qty: 0 | Refills: 0 | Status: DISCONTINUED | OUTPATIENT
Start: 2017-11-28 | End: 2017-11-29

## 2017-11-28 RX ADMIN — FINASTERIDE 5 MILLIGRAM(S): 5 TABLET, FILM COATED ORAL at 11:12

## 2017-11-28 RX ADMIN — Medication 25 MILLIGRAM(S): at 05:25

## 2017-11-28 RX ADMIN — TAMSULOSIN HYDROCHLORIDE 0.4 MILLIGRAM(S): 0.4 CAPSULE ORAL at 21:22

## 2017-11-28 RX ADMIN — Medication 325 MILLIGRAM(S): at 18:23

## 2017-11-28 RX ADMIN — Medication 100 MILLIGRAM(S): at 18:23

## 2017-11-28 RX ADMIN — CEFTRIAXONE 100 GRAM(S): 500 INJECTION, POWDER, FOR SOLUTION INTRAMUSCULAR; INTRAVENOUS at 15:28

## 2017-11-28 RX ADMIN — Medication 250 MILLIGRAM(S): at 18:23

## 2017-11-28 RX ADMIN — ESCITALOPRAM OXALATE 20 MILLIGRAM(S): 10 TABLET, FILM COATED ORAL at 11:11

## 2017-11-28 RX ADMIN — MODAFINIL 200 MILLIGRAM(S): 200 TABLET ORAL at 15:28

## 2017-11-28 RX ADMIN — Medication 325 MILLIGRAM(S): at 11:09

## 2017-11-28 RX ADMIN — Medication 25 MILLIGRAM(S): at 18:23

## 2017-11-28 RX ADMIN — Medication 250 MILLIGRAM(S): at 05:25

## 2017-11-28 RX ADMIN — SODIUM CHLORIDE 100 MILLILITER(S): 9 INJECTION INTRAMUSCULAR; INTRAVENOUS; SUBCUTANEOUS at 11:11

## 2017-11-28 RX ADMIN — LOSARTAN POTASSIUM 25 MILLIGRAM(S): 100 TABLET, FILM COATED ORAL at 05:25

## 2017-11-28 RX ADMIN — SODIUM CHLORIDE 100 MILLILITER(S): 9 INJECTION INTRAMUSCULAR; INTRAVENOUS; SUBCUTANEOUS at 11:17

## 2017-11-28 NOTE — PROGRESS NOTE ADULT - ASSESSMENT
, HTN , BPH s/p TURP on 11/17 admitted for gross  hematuria, improving with waddell and CBI , urology on board      Problem/Plan - 1:  ·  Problem: Gross hematuria.  Plan: improving , continue CBI as per   keep waddell    continue flomax, empirical iv abx / Florastor     Problem/Plan - 2:  ·  Problem: Benign prostatic hyperplasia, unspecified whether lower urinary tract symptoms present.  Plan: s/p recent TURP.      Problem/Plan - 3:  ·  Problem: Aortic valve replaced.  Plan: stable.      Problem/Plan - 4:  ·  Problem: Essential hypertension.  Plan: continue metoprolol, uncontrolled, low dose losartan started on 11/27, if remains elevated will increase to 50 mg daily    Problem/Plan -5:  Problem: Hyponatremia. Plan: NS x one liter.    Dispo: Seen by PT, recommend home with PT when medically stable , HTN , BPH s/p TURP on 11/17 admitted for gross  hematuria, improving with waddell and CBI , urology on board      Problem/Plan - 1:  ·  Problem: Gross hematuria.  Plan: improving , continue CBI as per   keep waddell    continue flomax, empirical iv abx / Florastor     Problem/Plan - 2:  ·  Problem: Benign prostatic hyperplasia, unspecified whether lower urinary tract symptoms present.  Plan: s/p recent TURP.      Problem/Plan - 3:  ·  Problem: Aortic valve replaced.  Plan: stable.      Problem/Plan - 4:  ·  Problem: Essential hypertension.  Plan: continue metoprolol, uncontrolled, low dose losartan started on 11/27, if remains elevated will increase to 50 mg daily  Problem/Plan -5:  Problem: Hyponatremia. Plan: NS x one liter.  repeat lab in am     Dispo: Seen by PT, recommend home with PT when medically stable

## 2017-11-28 NOTE — PROGRESS NOTE ADULT - SUBJECTIVE AND OBJECTIVE BOX
CC: Hematuria     INTERVAL HPI/OVERNIGHT EVENTS: Urine light pink, CBI continued as per . Denies chest pain, SOB, dizziness, lightheadedness, nausea, vomiting, fever, chills, reports BM yesterday. Feels tired.     Vital Signs Last 24 Hrs  T(C): 36.7 (28 Nov 2017 09:30), Max: 37.1 (27 Nov 2017 16:39)  T(F): 98 (28 Nov 2017 09:30), Max: 98.8 (27 Nov 2017 16:39)  HR: 60 (28 Nov 2017 09:30) (60 - 73)  BP: 168/68 (28 Nov 2017 09:30) (138/70 - 171/71)  BP(mean): --  RR: 17 (28 Nov 2017 09:30) (17 - 18)  SpO2: 98% (28 Nov 2017 09:30) (97% - 98%)    PHYSICAL EXAM:  GENERAL: NAD, well-groomed, well-developed  CHEST/LUNG: Clear to auscultation  bilaterally; No rales, rhonchi, wheezing, or rubs  HEART: Regular rate and rhythm; No murmurs, rubs, or gallops  ABDOMEN: Soft, Nontender, Nondistended; Bowel sounds present  EXTREMITIES:  2+ Peripheral Pulses, No cyanosis, or edema        I&O's Detail    27 Nov 2017 07:01  -  28 Nov 2017 07:00  --------------------------------------------------------  IN:    Continuous Bladder Irrigation: 800 mL  Total IN: 800 mL    OUT:    Continuous Bladder Irrigation: 7150 mL    Voided: 1500 mL  Total OUT: 8650 mL    Total NET: -7850 mL      28 Nov 2017 07:01  -  28 Nov 2017 12:37  --------------------------------------------------------  IN:  Total IN: 0 mL    OUT:    Continuous Bladder Irrigation: 4900 mL  Total OUT: 4900 mL    Total NET: -4900 mL                                    12.2   7.2   )-----------( 260      ( 28 Nov 2017 08:13 )             37.3     28 Nov 2017 08:13    131    |  93     |  13.0   ----------------------------<  112    4.3     |  29.0   |  0.67     Ca    8.4        28 Nov 2017 08:13  Phos  3.4       28 Nov 2017 08:13  Mg     2.1       28 Nov 2017 08:13        CAPILLARY BLOOD GLUCOSE              MEDICATIONS  (STANDING):  ascorbic acid 250 milliGRAM(s) Oral two times a day  cefTRIAXone   IVPB 1 Gram(s) IV Intermittent every 24 hours  escitalopram 20 milliGRAM(s) Oral daily  ferrous    sulfate 325 milliGRAM(s) Oral two times a day with meals  finasteride 5 milliGRAM(s) Oral daily  losartan 25 milliGRAM(s) Oral daily  metoprolol     tartrate 25 milliGRAM(s) Oral two times a day  modafinil 200 milliGRAM(s) Oral daily  saccharomyces boulardii 250 milliGRAM(s) Oral two times a day  sodium chloride 0.9%. 1000 milliLiter(s) (100 mL/Hr) IV Continuous <Continuous>  tamsulosin 0.4 milliGRAM(s) Oral at bedtime    MEDICATIONS  (PRN):  docusate sodium 100 milliGRAM(s) Oral three times a day PRN Constipation      RADIOLOGY & ADDITIONAL TESTS:

## 2017-11-28 NOTE — PROGRESS NOTE ADULT - SUBJECTIVE AND OBJECTIVE BOX
INTERVAL HPI/OVERNIGHT EVENTS:    MEDICATIONS  (STANDING):  ascorbic acid 250 milliGRAM(s) Oral two times a day  cefTRIAXone   IVPB 1 Gram(s) IV Intermittent every 24 hours  escitalopram 20 milliGRAM(s) Oral daily  ferrous    sulfate 325 milliGRAM(s) Oral two times a day with meals  finasteride 5 milliGRAM(s) Oral daily  losartan 25 milliGRAM(s) Oral daily  metoprolol     tartrate 25 milliGRAM(s) Oral two times a day  modafinil 200 milliGRAM(s) Oral daily  saccharomyces boulardii 250 milliGRAM(s) Oral two times a day  tamsulosin 0.4 milliGRAM(s) Oral at bedtime    MEDICATIONS  (PRN):  docusate sodium 100 milliGRAM(s) Oral three times a day PRN Constipation      Allergies    No Known Allergies    Intolerances        Vital Signs Last 24 Hrs  T(C): 36.4 (27 Nov 2017 23:52), Max: 37.1 (27 Nov 2017 16:39)  T(F): 97.6 (27 Nov 2017 23:52), Max: 98.8 (27 Nov 2017 16:39)  HR: 64 (28 Nov 2017 05:26) (64 - 73)  BP: 161/71 (28 Nov 2017 05:26) (138/70 - 162/75)  BP(mean): --  RR: 18 (28 Nov 2017 05:26) (18 - 18)  SpO2: 97% (28 Nov 2017 05:26) (97% - 98%)     ON PE:  General: alert and awake  Abdomen: soft ND/NT BS+  : Yu intact draining pink urine    LABS:                        12.7   x     )-----------( x        ( 27 Nov 2017 06:33 )             38.7     11-27    129<L>  |  95<L>  |  13.0  ----------------------------<  109  4.3   |  24.0  |  0.63    Ca    8.3<L>      27 Nov 2017 06:33  Mg     2.0     11-27            RADIOLOGY & ADDITIONAL TESTS:

## 2017-11-28 NOTE — PHYSICAL THERAPY INITIAL EVALUATION ADULT - ADDITIONAL COMMENTS
Pt lives in a    house with son with 3  steps to enter with  rails and no  stairs inside with  rails.  Pt owns medical equipment: RW multiple SAC  Pt lives with: Son   They are available to provide 24hr care

## 2017-11-29 LAB
ANION GAP SERPL CALC-SCNC: 12 MMOL/L — SIGNIFICANT CHANGE UP (ref 5–17)
BUN SERPL-MCNC: 13 MG/DL — SIGNIFICANT CHANGE UP (ref 8–20)
CALCIUM SERPL-MCNC: 8.4 MG/DL — LOW (ref 8.6–10.2)
CHLORIDE SERPL-SCNC: 95 MMOL/L — LOW (ref 98–107)
CO2 SERPL-SCNC: 29 MMOL/L — SIGNIFICANT CHANGE UP (ref 22–29)
CREAT SERPL-MCNC: 0.61 MG/DL — SIGNIFICANT CHANGE UP (ref 0.5–1.3)
CULTURE RESULTS: SIGNIFICANT CHANGE UP
CULTURE RESULTS: SIGNIFICANT CHANGE UP
GLUCOSE SERPL-MCNC: 121 MG/DL — HIGH (ref 70–115)
MAGNESIUM SERPL-MCNC: 2 MG/DL — SIGNIFICANT CHANGE UP (ref 1.6–2.6)
POTASSIUM SERPL-MCNC: 4.6 MMOL/L — SIGNIFICANT CHANGE UP (ref 3.5–5.3)
POTASSIUM SERPL-SCNC: 4.6 MMOL/L — SIGNIFICANT CHANGE UP (ref 3.5–5.3)
SODIUM SERPL-SCNC: 136 MMOL/L — SIGNIFICANT CHANGE UP (ref 135–145)
SPECIMEN SOURCE: SIGNIFICANT CHANGE UP
SPECIMEN SOURCE: SIGNIFICANT CHANGE UP

## 2017-11-29 PROCEDURE — 99233 SBSQ HOSP IP/OBS HIGH 50: CPT

## 2017-11-29 RX ORDER — METOPROLOL TARTRATE 50 MG
50 TABLET ORAL
Qty: 0 | Refills: 0 | Status: DISCONTINUED | OUTPATIENT
Start: 2017-11-29 | End: 2017-12-03

## 2017-11-29 RX ORDER — LOSARTAN POTASSIUM 100 MG/1
25 TABLET, FILM COATED ORAL ONCE
Qty: 0 | Refills: 0 | Status: COMPLETED | OUTPATIENT
Start: 2017-11-29 | End: 2017-11-29

## 2017-11-29 RX ORDER — METOPROLOL TARTRATE 50 MG
25 TABLET ORAL
Qty: 0 | Refills: 0 | Status: DISCONTINUED | OUTPATIENT
Start: 2017-11-29 | End: 2017-12-03

## 2017-11-29 RX ORDER — LOSARTAN POTASSIUM 100 MG/1
50 TABLET, FILM COATED ORAL DAILY
Qty: 0 | Refills: 0 | Status: DISCONTINUED | OUTPATIENT
Start: 2017-11-30 | End: 2017-12-03

## 2017-11-29 RX ADMIN — Medication 50 MILLIGRAM(S): at 17:38

## 2017-11-29 RX ADMIN — Medication 325 MILLIGRAM(S): at 17:38

## 2017-11-29 RX ADMIN — ESCITALOPRAM OXALATE 20 MILLIGRAM(S): 10 TABLET, FILM COATED ORAL at 13:28

## 2017-11-29 RX ADMIN — Medication 250 MILLIGRAM(S): at 06:23

## 2017-11-29 RX ADMIN — LOSARTAN POTASSIUM 25 MILLIGRAM(S): 100 TABLET, FILM COATED ORAL at 06:22

## 2017-11-29 RX ADMIN — Medication 250 MILLIGRAM(S): at 06:22

## 2017-11-29 RX ADMIN — MODAFINIL 200 MILLIGRAM(S): 200 TABLET ORAL at 13:27

## 2017-11-29 RX ADMIN — Medication 25 MILLIGRAM(S): at 06:23

## 2017-11-29 RX ADMIN — Medication 250 MILLIGRAM(S): at 17:38

## 2017-11-29 RX ADMIN — TAMSULOSIN HYDROCHLORIDE 0.4 MILLIGRAM(S): 0.4 CAPSULE ORAL at 21:33

## 2017-11-29 RX ADMIN — LOSARTAN POTASSIUM 25 MILLIGRAM(S): 100 TABLET, FILM COATED ORAL at 08:53

## 2017-11-29 RX ADMIN — Medication 325 MILLIGRAM(S): at 08:59

## 2017-11-29 RX ADMIN — CEFTRIAXONE 100 GRAM(S): 500 INJECTION, POWDER, FOR SOLUTION INTRAMUSCULAR; INTRAVENOUS at 13:29

## 2017-11-29 RX ADMIN — FINASTERIDE 5 MILLIGRAM(S): 5 TABLET, FILM COATED ORAL at 13:28

## 2017-11-29 NOTE — PROGRESS NOTE ADULT - ASSESSMENT
, HTN , BPH s/p TURP on 11/17 admitted for gross  hematuria, improving with waddell and CBI , urology on board      Problem/Plan - 1:  ·  Problem: Gross hematuria.  Plan: improving , continue CBI as per   keep waddell    continue flomax, empirical iv abx, day 6 / Florastor     Problem/Plan - 2:  ·  Problem: Benign prostatic hyperplasia, unspecified whether lower urinary tract symptoms present.  Plan: s/p recent TURP.      Problem/Plan - 3:  ·  Problem: Aortic valve replaced.  Plan: stable.      Problem/Plan - 4:  ·  Problem: Essential hypertension.  Plan: continue metoprolol, uncontrolled, low dose losartan started on 11/27,  increase Losartan  to 50 mg daily    Problem/Plan -5:  Problem: Hyponatremia. Plan: resolved with NS, monitor levels    Dispo: Seen by PT, recommend home with PT when medically stable

## 2017-11-29 NOTE — PROGRESS NOTE ADULT - SUBJECTIVE AND OBJECTIVE BOX
CC: Hematuria    INTERVAL HPI/OVERNIGHT EVENTS: CBI running, still with pink tinged urine. Denies chest pain, SOB, dizziness, lightheadedness, nausea, vomiting, fever, chills    Vital Signs Last 24 Hrs  T(C): 36.5 (29 Nov 2017 08:00), Max: 37 (28 Nov 2017 15:28)  T(F): 97.7 (29 Nov 2017 08:00), Max: 98.6 (28 Nov 2017 15:28)  HR: 58 (29 Nov 2017 09:30) (58 - 61)  BP: 147/60 (29 Nov 2017 09:30) (146/65 - 186/63)  BP(mean): --  RR: 18 (29 Nov 2017 08:00) (18 - 18)  SpO2: 96% (29 Nov 2017 08:00) (96% - 98%)  I&O's Detail    28 Nov 2017 07:01  -  29 Nov 2017 07:00  --------------------------------------------------------  IN:    Continuous Bladder Irrigation: 6000 mL    sodium chloride 0.9%: 700 mL  Total IN: 6700 mL    OUT:    Continuous Bladder Irrigation: 22177 mL  Total OUT: 64904 mL    Total NET: -6100 mL      29 Nov 2017 07:01  -  29 Nov 2017 12:00  --------------------------------------------------------  IN:  Total IN: 0 mL    OUT:    Continuous Bladder Irrigation: 3700 mL  Total OUT: 3700 mL    Total NET: -3700 mL      PHYSICAL EXAM:  GENERAL: NAD, well-groomed, well-developed  CHEST/LUNG: Clear to auscultation  bilaterally; No rales, rhonchi, wheezing, or rubs  HEART: Regular rate and rhythm; No murmurs, rubs, or gallops  ABDOMEN: Soft, Nontender, Nondistended; Bowel sounds present  EXTREMITIES:  2+ Peripheral Pulses, No cyanosis, or edema                              12.2   7.2   )-----------( 260      ( 28 Nov 2017 08:13 )             37.3     29 Nov 2017 09:26    136    |  95     |  13.0   ----------------------------<  121    4.6     |  29.0   |  0.61     Ca    8.4        29 Nov 2017 09:26  Phos  3.4       28 Nov 2017 08:13  Mg     2.0       29 Nov 2017 09:26        CAPILLARY BLOOD GLUCOSE              MEDICATIONS  (STANDING):  ascorbic acid 250 milliGRAM(s) Oral two times a day  cefTRIAXone   IVPB 1 Gram(s) IV Intermittent every 24 hours  escitalopram 20 milliGRAM(s) Oral daily  ferrous    sulfate 325 milliGRAM(s) Oral two times a day with meals  finasteride 5 milliGRAM(s) Oral daily  metoprolol     tartrate 50 milliGRAM(s) Oral after dinner  metoprolol     tartrate 25 milliGRAM(s) Oral before breakfast  modafinil 200 milliGRAM(s) Oral daily  saccharomyces boulardii 250 milliGRAM(s) Oral two times a day  tamsulosin 0.4 milliGRAM(s) Oral at bedtime    MEDICATIONS  (PRN):  docusate sodium 100 milliGRAM(s) Oral three times a day PRN Constipation      RADIOLOGY & ADDITIONAL TESTS:

## 2017-11-30 DIAGNOSIS — I35.0 NONRHEUMATIC AORTIC (VALVE) STENOSIS: ICD-10-CM

## 2017-11-30 DIAGNOSIS — H35.30 UNSPECIFIED MACULAR DEGENERATION: ICD-10-CM

## 2017-11-30 DIAGNOSIS — E87.1 HYPO-OSMOLALITY AND HYPONATREMIA: ICD-10-CM

## 2017-11-30 LAB
ANION GAP SERPL CALC-SCNC: 14 MMOL/L — SIGNIFICANT CHANGE UP (ref 5–17)
BUN SERPL-MCNC: 17 MG/DL — SIGNIFICANT CHANGE UP (ref 8–20)
CALCIUM SERPL-MCNC: 8.3 MG/DL — LOW (ref 8.6–10.2)
CHLORIDE SERPL-SCNC: 95 MMOL/L — LOW (ref 98–107)
CO2 SERPL-SCNC: 25 MMOL/L — SIGNIFICANT CHANGE UP (ref 22–29)
CREAT SERPL-MCNC: 0.63 MG/DL — SIGNIFICANT CHANGE UP (ref 0.5–1.3)
GLUCOSE SERPL-MCNC: 110 MG/DL — SIGNIFICANT CHANGE UP (ref 70–115)
HCT VFR BLD CALC: 35.7 % — LOW (ref 42–52)
HGB BLD-MCNC: 11.8 G/DL — LOW (ref 14–18)
MAGNESIUM SERPL-MCNC: 2 MG/DL — SIGNIFICANT CHANGE UP (ref 1.6–2.6)
MCHC RBC-ENTMCNC: 28.7 PG — SIGNIFICANT CHANGE UP (ref 27–31)
MCHC RBC-ENTMCNC: 33.1 G/DL — SIGNIFICANT CHANGE UP (ref 32–36)
MCV RBC AUTO: 86.9 FL — SIGNIFICANT CHANGE UP (ref 80–94)
PHOSPHATE SERPL-MCNC: 3.6 MG/DL — SIGNIFICANT CHANGE UP (ref 2.4–4.7)
PLATELET # BLD AUTO: 222 K/UL — SIGNIFICANT CHANGE UP (ref 150–400)
POTASSIUM SERPL-MCNC: 4.4 MMOL/L — SIGNIFICANT CHANGE UP (ref 3.5–5.3)
POTASSIUM SERPL-SCNC: 4.4 MMOL/L — SIGNIFICANT CHANGE UP (ref 3.5–5.3)
RBC # BLD: 4.11 M/UL — LOW (ref 4.6–6.2)
RBC # FLD: 14.2 % — SIGNIFICANT CHANGE UP (ref 11–15.6)
SODIUM SERPL-SCNC: 134 MMOL/L — LOW (ref 135–145)
WBC # BLD: 6.3 K/UL — SIGNIFICANT CHANGE UP (ref 4.8–10.8)
WBC # FLD AUTO: 6.3 K/UL — SIGNIFICANT CHANGE UP (ref 4.8–10.8)

## 2017-11-30 PROCEDURE — 99233 SBSQ HOSP IP/OBS HIGH 50: CPT

## 2017-11-30 RX ADMIN — Medication 250 MILLIGRAM(S): at 06:03

## 2017-11-30 RX ADMIN — TAMSULOSIN HYDROCHLORIDE 0.4 MILLIGRAM(S): 0.4 CAPSULE ORAL at 22:09

## 2017-11-30 RX ADMIN — ESCITALOPRAM OXALATE 20 MILLIGRAM(S): 10 TABLET, FILM COATED ORAL at 12:42

## 2017-11-30 RX ADMIN — Medication 250 MILLIGRAM(S): at 17:26

## 2017-11-30 RX ADMIN — FINASTERIDE 5 MILLIGRAM(S): 5 TABLET, FILM COATED ORAL at 12:42

## 2017-11-30 RX ADMIN — Medication 250 MILLIGRAM(S): at 17:25

## 2017-11-30 RX ADMIN — Medication 325 MILLIGRAM(S): at 17:25

## 2017-11-30 RX ADMIN — Medication 325 MILLIGRAM(S): at 08:59

## 2017-11-30 RX ADMIN — LOSARTAN POTASSIUM 50 MILLIGRAM(S): 100 TABLET, FILM COATED ORAL at 06:04

## 2017-11-30 RX ADMIN — CEFTRIAXONE 100 GRAM(S): 500 INJECTION, POWDER, FOR SOLUTION INTRAMUSCULAR; INTRAVENOUS at 12:42

## 2017-11-30 RX ADMIN — Medication 25 MILLIGRAM(S): at 06:02

## 2017-11-30 RX ADMIN — MODAFINIL 200 MILLIGRAM(S): 200 TABLET ORAL at 12:42

## 2017-11-30 RX ADMIN — Medication 50 MILLIGRAM(S): at 18:12

## 2017-11-30 NOTE — PROGRESS NOTE ADULT - ASSESSMENT
, HTN , BPH s/p TURP on 11/17 admitted for gross  hematuria, improving with waddell and CBI , urology on board      Problem/Plan - 1:  ·  Problem: Gross hematuria.  Plan: improving , continue CBI as per   keep waddell    continue flomax, empirical iv abx, day 6 / Florastor     Problem/Plan - 2:  ·  Problem: Benign prostatic hyperplasia, unspecified whether lower urinary tract symptoms present.  Plan: s/p recent TURP.      Problem/Plan - 3:  ·  Problem: Aortic valve replaced.  Plan: stable.      Problem/Plan - 4:  ·  Problem: Essential hypertension.  Plan: continue metoprolol, uncontrolled, low dose losartan started on 11/27,  increase Losartan  to 50 mg daily    Problem/Plan -5:  Problem: Hyponatremia. Plan: resolved with NS, monitor levels    Dispo: Seen by PT, recommend home with PT when medically stable , HTN , BPH s/p TURP on 11/17 admitted for gross  hematuria, improving with waddell and CBI , urology on board

## 2017-11-30 NOTE — PROGRESS NOTE ADULT - SUBJECTIVE AND OBJECTIVE BOX
CC: Hematuria, denies any pain     INTERVAL HPI/OVERNIGHT EVENTS: CBI running, still with pink tinged urine, denies chest pain, SOB, dizziness, lightheadedness, nausea, vomiting, fever, chills    Vital Signs Last 24 Hrs  T(C): 36.8 (30 Nov 2017 08:05), Max: 36.8 (29 Nov 2017 23:53)  T(F): 98.3 (30 Nov 2017 08:05), Max: 98.3 (30 Nov 2017 08:05)  HR: 74 (30 Nov 2017 08:05) (58 - 78)  BP: 148/62 (30 Nov 2017 08:05) (147/60 - 168/80)  BP(mean): --  RR: 18 (30 Nov 2017 08:05) (18 - 18)  SpO2: 94% (30 Nov 2017 08:05) (94% - 99%)  I&O's Detail    28 Nov 2017 07:01  -  29 Nov 2017 07:00  --------------------------------------------------------    PHYSICAL EXAM:  GENERAL: NAD, well-groomed, well-developed  CHEST/LUNG: Clear to auscultation  bilaterally; No rales, rhonchi, wheezing, or rubs  HEART: Regular rate and rhythm; No murmurs, rubs, or gallops  ABDOMEN: Soft, Nontender, Nondistended; Bowel sounds present  EXTREMITIES:  2+ Peripheral Pulses, No cyanosis, or edema                                                   11.8   6.3   )-----------( 222      ( 30 Nov 2017 07:14 )             35.7   11-30    134<L>  |  95<L>  |  17.0  ----------------------------<  110  4.4   |  25.0  |  0.63    Ca    8.3<L>      30 Nov 2017 07:14  Phos  3.6     11-30  Mg     2.0     11-30    CAPILLARY BLOOD GLUCOSE      MEDICATIONS  (STANDING):  ascorbic acid 250 milliGRAM(s) Oral two times a day  cefTRIAXone   IVPB 1 Gram(s) IV Intermittent every 24 hours  escitalopram 20 milliGRAM(s) Oral daily  ferrous    sulfate 325 milliGRAM(s) Oral two times a day with meals  finasteride 5 milliGRAM(s) Oral daily  losartan 50 milliGRAM(s) Oral daily  metoprolol     tartrate 50 milliGRAM(s) Oral after dinner  metoprolol     tartrate 25 milliGRAM(s) Oral before breakfast  modafinil 200 milliGRAM(s) Oral daily  saccharomyces boulardii 250 milliGRAM(s) Oral two times a day  tamsulosin 0.4 milliGRAM(s) Oral at bedtime    MEDICATIONS  (PRN):  docusate sodium 100 milliGRAM(s) Oral three times a day PRN Constipation

## 2017-11-30 NOTE — PROGRESS NOTE ADULT - PROBLEM SELECTOR PLAN 1
s/p TUR. Would titrate CBI to off over next 24 hrs. Eventually discharge with Yu for out patient voiding trial.

## 2017-11-30 NOTE — PROGRESS NOTE ADULT - SUBJECTIVE AND OBJECTIVE BOX
INTERVAL HPI/OVERNIGHT EVENTS:    MEDICATIONS  (STANDING):  ascorbic acid 250 milliGRAM(s) Oral two times a day  cefTRIAXone   IVPB 1 Gram(s) IV Intermittent every 24 hours  escitalopram 20 milliGRAM(s) Oral daily  ferrous    sulfate 325 milliGRAM(s) Oral two times a day with meals  finasteride 5 milliGRAM(s) Oral daily  losartan 50 milliGRAM(s) Oral daily  metoprolol     tartrate 50 milliGRAM(s) Oral after dinner  metoprolol     tartrate 25 milliGRAM(s) Oral before breakfast  modafinil 200 milliGRAM(s) Oral daily  saccharomyces boulardii 250 milliGRAM(s) Oral two times a day  tamsulosin 0.4 milliGRAM(s) Oral at bedtime    MEDICATIONS  (PRN):  docusate sodium 100 milliGRAM(s) Oral three times a day PRN Constipation      Allergies    No Known Allergies    Intolerances        Vital Signs Last 24 Hrs  T(C): 36.8 (29 Nov 2017 23:53), Max: 36.8 (29 Nov 2017 23:53)  T(F): 98.2 (29 Nov 2017 23:53), Max: 98.2 (29 Nov 2017 23:53)  HR: 76 (30 Nov 2017 05:47) (58 - 78)  BP: 168/80 (30 Nov 2017 05:47) (147/60 - 186/63)  BP(mean): --  RR: 18 (30 Nov 2017 05:47) (18 - 18)  SpO2: 96% (30 Nov 2017 05:47) (95% - 99%)     ON PE:  General: alert and awake  Abdomen: soft ND/NT No flank pain  : urine clearing on CBI. Bladder not distended.    LABS:                        11.8   6.3   )-----------( 222      ( 30 Nov 2017 07:14 )             35.7     11-29    136  |  95<L>  |  13.0  ----------------------------<  121<H>  4.6   |  29.0  |  0.61    Ca    8.4<L>      29 Nov 2017 09:26  Phos  3.4     11-28  Mg     2.0     11-29            RADIOLOGY & ADDITIONAL TESTS:

## 2017-12-01 ENCOUNTER — TRANSCRIPTION ENCOUNTER (OUTPATIENT)
Age: 82
End: 2017-12-01

## 2017-12-01 PROCEDURE — 99233 SBSQ HOSP IP/OBS HIGH 50: CPT

## 2017-12-01 RX ADMIN — Medication 50 MILLIGRAM(S): at 17:44

## 2017-12-01 RX ADMIN — Medication 250 MILLIGRAM(S): at 17:42

## 2017-12-01 RX ADMIN — TAMSULOSIN HYDROCHLORIDE 0.4 MILLIGRAM(S): 0.4 CAPSULE ORAL at 21:54

## 2017-12-01 RX ADMIN — Medication 325 MILLIGRAM(S): at 17:42

## 2017-12-01 RX ADMIN — Medication 250 MILLIGRAM(S): at 05:38

## 2017-12-01 RX ADMIN — Medication 250 MILLIGRAM(S): at 17:43

## 2017-12-01 RX ADMIN — Medication 100 MILLIGRAM(S): at 12:02

## 2017-12-01 RX ADMIN — ESCITALOPRAM OXALATE 20 MILLIGRAM(S): 10 TABLET, FILM COATED ORAL at 12:03

## 2017-12-01 RX ADMIN — CEFTRIAXONE 100 GRAM(S): 500 INJECTION, POWDER, FOR SOLUTION INTRAMUSCULAR; INTRAVENOUS at 12:06

## 2017-12-01 RX ADMIN — MODAFINIL 200 MILLIGRAM(S): 200 TABLET ORAL at 12:05

## 2017-12-01 RX ADMIN — LOSARTAN POTASSIUM 50 MILLIGRAM(S): 100 TABLET, FILM COATED ORAL at 05:38

## 2017-12-01 RX ADMIN — Medication 25 MILLIGRAM(S): at 06:02

## 2017-12-01 RX ADMIN — Medication 325 MILLIGRAM(S): at 07:49

## 2017-12-01 RX ADMIN — FINASTERIDE 5 MILLIGRAM(S): 5 TABLET, FILM COATED ORAL at 12:03

## 2017-12-01 NOTE — DISCHARGE NOTE ADULT - MEDICATION SUMMARY - MEDICATIONS TO TAKE
I will START or STAY ON the medications listed below when I get home from the hospital:    finasteride 1 mg oral tablet  -- 1 tab(s) by mouth once a day  -- Indication: For BPH (benign prostatic hyperplasia)    losartan 50 mg oral tablet  -- 1 tab(s) by mouth once a day  -- Indication: For HTN    tamsulosin 0.4 mg oral capsule  -- 1 cap(s) by mouth once a day  -- Indication: For BPH (benign prostatic hyperplasia)    escitalopram 20 mg oral tablet  -- 1 tab(s) by mouth once a day  -- Indication: For Depression    metoprolol tartrate 25 mg oral tablet  -- 1 tab(s) by mouth once a day  -- Indication: For HTN    metoprolol tartrate 50 mg oral tablet  -- 1 tab(s) by mouth once a day (at bedtime)  -- Indication: For HTN    modafinil 200 mg oral tablet  -- 1 tab(s) by mouth once a day (in the morning)  -- Indication: For Alertness    saw palmetto 320 mg with phytosterols oral capsule  -- 2 cap(s) by mouth once a day  -- Indication: For BPH (benign prostatic hyperplasia)    ferrous sulfate 325 mg (65 mg elemental iron) oral tablet  -- 1 tab(s) by mouth 3 times a day  -- Indication: For Anemia    senna oral tablet  -- 2 tab(s) by mouth once a day  -- Indication: For Constipation    Colace 100 mg oral capsule  -- 1 cap(s) by mouth 2 times a day  -- Indication: For Constipation    ubiquinone 100 mg oral capsule  -- 1 cap(s) by mouth once a day  -- Indication: For Insomnia    melatonin 5 mg sublingual tablet  -- 1 tab(s) under tongue once a day (at bedtime)  -- Indication: For Insomnia    Multiple Vitamins oral capsule  -- 1 cap(s) by mouth once a day  -- Indication: For Supplement    Vitamin C 250 mg oral tablet  -- 1 tab(s) by mouth once a day  -- Indication: For Supplement    Vitamin D3 2000 intl units oral tablet  -- 1 tab(s) by mouth once a day  -- Indication: For Vitamin D Deficiency

## 2017-12-01 NOTE — DISCHARGE NOTE ADULT - MEDICATION SUMMARY - MEDICATIONS TO CHANGE
I will SWITCH the dose or number of times a day I take the medications listed below when I get home from the hospital:    Metoprolol Tartrate 25 mg oral tablet  -- 1 tab(s) by mouth 2 times a day

## 2017-12-01 NOTE — DIETITIAN INITIAL EVALUATION ADULT. - PROBLEM SELECTOR PLAN 1
- patient s/p Cystolitholapaxy and button TURP on 11/17/17  - hematuria possibly from recent procedure, UA showing large blood but no signs of clear infection, will f/u urine and blood cultures to r/o infection  - CBI, >700 cc of sanguinous urine removed on during initial waddell insertion  - Urology consulted

## 2017-12-01 NOTE — DISCHARGE NOTE ADULT - CARE PROVIDER_API CALL
Boyd Hill), Urology  [address], [place] [id number]  Phone: [phone]  Fax: [phone]    Jossue Doyle), Infectious Disease; Internal Medicine  [address] [place] [address]  Phone: [phone]  Fax: [phone]

## 2017-12-01 NOTE — PROGRESS NOTE ADULT - SUBJECTIVE AND OBJECTIVE BOX
CC: Hematuria, urinne light pink in the waddell cathater   pt is sitting in the chair comfortable , no distress no complaints   intermittent CBI running     Vital Signs Last 24 Hrs  T(C): 36.7 (01 Dec 2017 08:56), Max: 36.7 (30 Nov 2017 16:55)  T(F): 98 (01 Dec 2017 08:56), Max: 98.1 (30 Nov 2017 16:55)  HR: 58 (01 Dec 2017 08:56) (58 - 73)  BP: 155/63 (01 Dec 2017 08:56) (126/55 - 160/63)  BP(mean): --  RR: 18 (01 Dec 2017 08:56) (18 - 18)  SpO2: 97% (01 Dec 2017 08:56) (97% - 98%)  28 Nov 2017 07:01  -  29 Nov 2017 07:00  --------------------------------------------------------    PHYSICAL EXAM:  GENERAL: NAD, well-groomed, well-developed  CHEST/LUNG: Clear to auscultation  bilaterally; No rales, rhonchi, wheezing, or rubs  HEART: Regular rate and rhythm; No murmurs, rubs, or gallops  ABDOMEN: Soft, Nontender, Nondistended; Bowel sounds present  EXTREMITIES:  2+ Peripheral Pulses, No cyanosis, or edema                                  11.8   6.3   )-----------( 222      ( 30 Nov 2017 07:14 )             35.7   11-30    134<L>  |  95<L>  |  17.0  ----------------------------<  110  4.4   |  25.0  |  0.63    Ca    8.3<L>      30 Nov 2017 07:14  Phos  3.6     11-30  Mg     2.0     11-30      CAPILLARY BLOOD GLUCOSE  MEDICATIONS  (STANDING):  ascorbic acid 250 milliGRAM(s) Oral two times a day  cefTRIAXone   IVPB 1 Gram(s) IV Intermittent every 24 hours  escitalopram 20 milliGRAM(s) Oral daily  ferrous    sulfate 325 milliGRAM(s) Oral two times a day with meals  finasteride 5 milliGRAM(s) Oral daily  losartan 50 milliGRAM(s) Oral daily  metoprolol     tartrate 50 milliGRAM(s) Oral after dinner  metoprolol     tartrate 25 milliGRAM(s) Oral before breakfast  modafinil 200 milliGRAM(s) Oral daily  saccharomyces boulardii 250 milliGRAM(s) Oral two times a day  tamsulosin 0.4 milliGRAM(s) Oral at bedtime

## 2017-12-01 NOTE — DIETITIAN INITIAL EVALUATION ADULT. - PROBLEM SELECTOR PLAN 2
- will continue patient home medications, Flomax 0.4 mg and Proscar 5 mg  - patient s/p button TURP on 11/17/17

## 2017-12-01 NOTE — PROGRESS NOTE ADULT - ASSESSMENT
, HTN , BPH s/p TURP on 11/17 admitted for gross  hematuria, improving with waddell and CBI , urology on board , uncontrolled HTN losartan added

## 2017-12-01 NOTE — DISCHARGE NOTE ADULT - HOSPITAL COURSE
89 y/o M pt with PMH of Anemia, aortic valve stenosis with TAVR, and BPH s/p cystolitholapaxy, button TURP on 11/17/17, now presents to ED c/o hematuria x1 day. Patient himself is a very poor historian, likely due to underlying dementia. As per son's discussion with ED physician, patient had a Waddell in for 2 days after procedure. He was seen at Dr. Hill 3 days ago to take out Waddell and was able to void with no complications. As per son, patient experienced bright red blood dripping from urethra starting last night along with passage of small clots. Patient lives with son and son reports he tolerated PO today. Patient is otherwise asymptomatic and denies any fever, chills, CP, SOB, nausea, vomiting, abdominal pain, back pain, or dysuria, He was admitted CBI started  waddell inserted , seen by urology, flomax started for urinary symptoms, empirical abx given , urine cx send negative .  He had  elevated BP medication adjusted losartan added , better now, urine clearing CBI stopped , seen by urology daily , hyponatremia improving with iv hydration . 89 year old male with PMH of Anemia, aortic valve stenosis with TAVR, and BPH s/p cystolitholapaxy, button TURP on 11/17/17, now presents to ED c/o hematuria x1 day. Patient himself is a very poor historian, likely due to underlying dementia. As per son's discussion with ED physician, patient had a Waddell in for 2 days after procedure. He was seen at Dr. Hill 3 days ago to take out Waddell and was able to void with no complications. As per son, patient experienced bright red blood dripping from urethra starting last night along with passage of small clots. Patient lives with son and son reports he tolerated PO today. Patient is otherwise asymptomatic and denies any fever, chills, CP, SOB, nausea, vomiting, abdominal pain, back pain, or dysuria, He was admitted CBI started  waddell inserted , seen by urology, flomax started for urinary symptoms, empirical abx given , urine cx send negative .  He had  elevated BP medication adjusted losartan added , better now, urine clearing CBI stopped , seen by urology daily , hyponatremia improving with iv hydration.   On 12/1 After the CBI was discontinued he had hematuria. CBI was re-introduced and his urine was orangish on 12/2/2017. I spoke with Dr Mckeon from Urology who cleared him for discharge with close out-patient follow up. 89 year old male with PMH of Anemia, aortic valve stenosis with TAVR, and BPH s/p cystolitholapaxy, button TURP on 11/17/17, now presents to ED c/o hematuria x1 day. Patient himself is a very poor historian, likely due to underlying dementia. As per son's discussion with ED physician, patient had a Waddell in for 2 days after procedure. He was seen at Dr. Hill 3 days ago to take out Waddell and was able to void with no complications. As per son, patient experienced bright red blood dripping from urethra starting last night along with passage of small clots. Patient lives with son and son reports he tolerated PO today. Patient is otherwise asymptomatic and denies any fever, chills, CP, SOB, nausea, vomiting, abdominal pain, back pain, or dysuria, He was admitted CBI started  waddell inserted , seen by urology, flomax started for urinary symptoms, empirical abx given , urine cx send negative .  He had  elevated BP medication adjusted losartan added , better now, urine clearing CBI stopped , seen by urology daily , hyponatremia improving with iv hydration.   On 12/1 After the CBI was discontinued he had hematuria. CBI was re-introduced and his urine was orangish on 12/2/2017. I spoke with Dr Mckeon from Urology who cleared him for discharge with close out-patient follow up. Patient remained in hospital on 12/2, and was stable on 12/3. His bp was elevated and he was given hydralazine and he may resume his home medications.

## 2017-12-01 NOTE — DISCHARGE NOTE ADULT - PATIENT PORTAL LINK FT
“You can access the [facility] Patient Portal, offered by [facility], by registering with the following website: [email or web address]”

## 2017-12-01 NOTE — DIETITIAN INITIAL EVALUATION ADULT. - OTHER INFO
Pt admitted for hematuria. Pt tolerating regular diet with good appetite/intake 100% PO. No issues a this time.

## 2017-12-01 NOTE — PROGRESS NOTE ADULT - ASSESSMENT
Hematuria.  Stable urologically.  Hematuria has improved.  CBI off.  Home with waddell when stable.  Follow up with Dr. Hill next week for TOV.

## 2017-12-01 NOTE — PROGRESS NOTE ADULT - SUBJECTIVE AND OBJECTIVE BOX
INTERVAL HPI/OVERNIGHT EVENTS:  Patient is without new complaints.  CBI off, running clear.    MEDICATIONS  (STANDING):  ascorbic acid 250 milliGRAM(s) Oral two times a day  cefTRIAXone   IVPB 1 Gram(s) IV Intermittent every 24 hours  escitalopram 20 milliGRAM(s) Oral daily  ferrous    sulfate 325 milliGRAM(s) Oral two times a day with meals  finasteride 5 milliGRAM(s) Oral daily  losartan 50 milliGRAM(s) Oral daily  metoprolol     tartrate 50 milliGRAM(s) Oral after dinner  metoprolol     tartrate 25 milliGRAM(s) Oral before breakfast  modafinil 200 milliGRAM(s) Oral daily  saccharomyces boulardii 250 milliGRAM(s) Oral two times a day  tamsulosin 0.4 milliGRAM(s) Oral at bedtime    MEDICATIONS  (PRN):  docusate sodium 100 milliGRAM(s) Oral three times a day PRN Constipation      Allergies    No Known Allergies    Intolerances        Vital Signs Last 24 Hrs  T(C): 36.7 (01 Dec 2017 08:56), Max: 36.7 (30 Nov 2017 16:55)  T(F): 98 (01 Dec 2017 08:56), Max: 98.1 (30 Nov 2017 16:55)  HR: 58 (01 Dec 2017 08:56) (58 - 73)  BP: 155/63 (01 Dec 2017 08:56) (126/55 - 160/63)  BP(mean): --  RR: 18 (01 Dec 2017 08:56) (18 - 18)  SpO2: 97% (01 Dec 2017 08:56) (97% - 98%)    ROS:  as per HPI     ON PE:  General: Well developed; well nourished; in no acute distress  Head: Normocephalic; atraumatic  Respiratory: No tachypnea  Gastrointestinal: Soft non-tender non-distended;  Genitourinary: No costovertebral angle tenderness.  Urinary bladder is clinically not distended  Yu clear off CBI  Extremities: Normal range of motion, No edema  Neurological: Alert and oriented x4  Skin: Warm and dry. No acute rash  Psychiatric: Cooperative and appropriate      LABS:                        11.8   6.3   )-----------( 222      ( 30 Nov 2017 07:14 )             35.7     11-30    134<L>  |  95<L>  |  17.0  ----------------------------<  110  4.4   |  25.0  |  0.63    Ca    8.3<L>      30 Nov 2017 07:14  Phos  3.6     11-30  Mg     2.0     11-30            RADIOLOGY & ADDITIONAL TESTS:

## 2017-12-01 NOTE — DISCHARGE NOTE ADULT - CARE PLAN
Principal Discharge DX:	Gross hematuria  Goal:	improved  Instructions for follow-up, activity and diet:	follow up with Dr Hill  next week  Secondary Diagnosis:	Essential hypertension  Secondary Diagnosis:	Hyponatremia  Goal:	resolved  Secondary Diagnosis:	Aortic valve replaced  Secondary Diagnosis:	Anemia  Goal:	stable  Secondary Diagnosis:	Depression Principal Discharge DX:	Gross hematuria  Goal:	improved  Instructions for follow-up, activity and diet:	follow up with Dr Hill  next week  Secondary Diagnosis:	Essential hypertension  Goal:	Tolerable BP  Secondary Diagnosis:	Hyponatremia  Goal:	resolved  Secondary Diagnosis:	Aortic valve replaced  Secondary Diagnosis:	Anemia  Goal:	stable  Secondary Diagnosis:	Depression

## 2017-12-01 NOTE — DISCHARGE NOTE ADULT - MEDICATION SUMMARY - MEDICATIONS TO STOP TAKING
I will STOP taking the medications listed below when I get home from the hospital:  None I will STOP taking the medications listed below when I get home from the hospital:    Aspir 81 oral delayed release tablet  -- 1 tab(s) by mouth once a day in am

## 2017-12-01 NOTE — PROGRESS NOTE ADULT - PROBLEM SELECTOR PLAN 1
due to recent TURP likely , continue waddell , CBI intermittent   urology follow up , may need to go home with waddlel

## 2017-12-02 LAB
ANION GAP SERPL CALC-SCNC: 11 MMOL/L — SIGNIFICANT CHANGE UP (ref 5–17)
BUN SERPL-MCNC: 20 MG/DL — SIGNIFICANT CHANGE UP (ref 8–20)
CALCIUM SERPL-MCNC: 8.2 MG/DL — LOW (ref 8.6–10.2)
CHLORIDE SERPL-SCNC: 93 MMOL/L — LOW (ref 98–107)
CO2 SERPL-SCNC: 29 MMOL/L — SIGNIFICANT CHANGE UP (ref 22–29)
CREAT SERPL-MCNC: 0.65 MG/DL — SIGNIFICANT CHANGE UP (ref 0.5–1.3)
GLUCOSE SERPL-MCNC: 107 MG/DL — SIGNIFICANT CHANGE UP (ref 70–115)
POTASSIUM SERPL-MCNC: 4.4 MMOL/L — SIGNIFICANT CHANGE UP (ref 3.5–5.3)
POTASSIUM SERPL-SCNC: 4.4 MMOL/L — SIGNIFICANT CHANGE UP (ref 3.5–5.3)
SODIUM SERPL-SCNC: 133 MMOL/L — LOW (ref 135–145)

## 2017-12-02 PROCEDURE — 99233 SBSQ HOSP IP/OBS HIGH 50: CPT

## 2017-12-02 RX ORDER — ESCITALOPRAM OXALATE 10 MG/1
1 TABLET, FILM COATED ORAL
Qty: 0 | Refills: 0 | COMMUNITY

## 2017-12-02 RX ORDER — METOPROLOL TARTRATE 50 MG
1 TABLET ORAL
Qty: 0 | Refills: 1 | COMMUNITY

## 2017-12-02 RX ORDER — ASCORBIC ACID 60 MG
1 TABLET,CHEWABLE ORAL
Qty: 0 | Refills: 0 | DISCHARGE
Start: 2017-12-02

## 2017-12-02 RX ORDER — LANOLIN ALCOHOL/MO/W.PET/CERES
1 CREAM (GRAM) TOPICAL
Qty: 0 | Refills: 0 | DISCHARGE
Start: 2017-12-02

## 2017-12-02 RX ORDER — CHOLECALCIFEROL (VITAMIN D3) 125 MCG
1 CAPSULE ORAL
Qty: 0 | Refills: 0 | DISCHARGE
Start: 2017-12-02

## 2017-12-02 RX ORDER — ESCITALOPRAM OXALATE 10 MG/1
1 TABLET, FILM COATED ORAL
Qty: 0 | Refills: 0 | DISCHARGE
Start: 2017-12-02

## 2017-12-02 RX ORDER — UBIDECARENONE 100 MG
100 CAPSULE ORAL
Qty: 0 | Refills: 0 | COMMUNITY

## 2017-12-02 RX ORDER — SENNA PLUS 8.6 MG/1
2 TABLET ORAL
Qty: 0 | Refills: 0 | COMMUNITY
Start: 2017-12-02

## 2017-12-02 RX ORDER — CHOLECALCIFEROL (VITAMIN D3) 125 MCG
1 CAPSULE ORAL
Qty: 0 | Refills: 0 | COMMUNITY

## 2017-12-02 RX ORDER — MULTIVIT-MIN/FERROUS GLUCONATE 9 MG/15 ML
1 LIQUID (ML) ORAL
Qty: 0 | Refills: 0 | COMMUNITY

## 2017-12-02 RX ORDER — UBIDECARENONE 100 MG
1 CAPSULE ORAL
Qty: 0 | Refills: 0 | DISCHARGE
Start: 2017-12-02

## 2017-12-02 RX ORDER — LOSARTAN POTASSIUM 100 MG/1
1 TABLET, FILM COATED ORAL
Qty: 30 | Refills: 0 | OUTPATIENT
Start: 2017-12-02 | End: 2018-01-01

## 2017-12-02 RX ORDER — DOCUSATE SODIUM 100 MG
1 CAPSULE ORAL
Qty: 0 | Refills: 0 | COMMUNITY
Start: 2017-12-02

## 2017-12-02 RX ORDER — MODAFINIL 200 MG/1
1 TABLET ORAL
Qty: 0 | Refills: 0 | DISCHARGE
Start: 2017-12-02

## 2017-12-02 RX ORDER — FINASTERIDE 5 MG/1
1 TABLET, FILM COATED ORAL
Qty: 0 | Refills: 0 | COMMUNITY
Start: 2017-12-02

## 2017-12-02 RX ORDER — LANOLIN ALCOHOL/MO/W.PET/CERES
6 CREAM (GRAM) TOPICAL
Qty: 0 | Refills: 0 | COMMUNITY

## 2017-12-02 RX ORDER — MODAFINIL 200 MG/1
1 TABLET ORAL
Qty: 0 | Refills: 0 | COMMUNITY

## 2017-12-02 RX ORDER — METOPROLOL TARTRATE 50 MG
1 TABLET ORAL
Qty: 0 | Refills: 0 | COMMUNITY
Start: 2017-12-02

## 2017-12-02 RX ORDER — ASPIRIN/CALCIUM CARB/MAGNESIUM 324 MG
1 TABLET ORAL
Qty: 0 | Refills: 0 | COMMUNITY

## 2017-12-02 RX ORDER — FINASTERIDE 5 MG/1
1 TABLET, FILM COATED ORAL
Qty: 0 | Refills: 0 | COMMUNITY

## 2017-12-02 RX ORDER — FERROUS SULFATE 325(65) MG
1 TABLET ORAL
Qty: 0 | Refills: 0 | COMMUNITY
Start: 2017-12-02

## 2017-12-02 RX ORDER — TAMSULOSIN HYDROCHLORIDE 0.4 MG/1
1 CAPSULE ORAL
Qty: 0 | Refills: 0 | COMMUNITY
Start: 2017-12-02

## 2017-12-02 RX ADMIN — FINASTERIDE 5 MILLIGRAM(S): 5 TABLET, FILM COATED ORAL at 12:16

## 2017-12-02 RX ADMIN — Medication 250 MILLIGRAM(S): at 05:30

## 2017-12-02 RX ADMIN — Medication 325 MILLIGRAM(S): at 12:16

## 2017-12-02 RX ADMIN — Medication 250 MILLIGRAM(S): at 17:19

## 2017-12-02 RX ADMIN — ESCITALOPRAM OXALATE 20 MILLIGRAM(S): 10 TABLET, FILM COATED ORAL at 12:16

## 2017-12-02 RX ADMIN — Medication 325 MILLIGRAM(S): at 17:19

## 2017-12-02 RX ADMIN — Medication 25 MILLIGRAM(S): at 06:33

## 2017-12-02 RX ADMIN — Medication 50 MILLIGRAM(S): at 17:28

## 2017-12-02 RX ADMIN — Medication 250 MILLIGRAM(S): at 17:20

## 2017-12-02 RX ADMIN — TAMSULOSIN HYDROCHLORIDE 0.4 MILLIGRAM(S): 0.4 CAPSULE ORAL at 21:07

## 2017-12-02 RX ADMIN — LOSARTAN POTASSIUM 50 MILLIGRAM(S): 100 TABLET, FILM COATED ORAL at 05:30

## 2017-12-02 RX ADMIN — MODAFINIL 200 MILLIGRAM(S): 200 TABLET ORAL at 12:16

## 2017-12-02 NOTE — PROGRESS NOTE ADULT - ASSESSMENT
89 year old male with PMH of HTN , BPH s/p TURP on 11/17 admitted for gross  hematuria, improving with waddell and CBI , urology on board , uncontrolled HTN losartan added. Dr Mckeon suggest discharge once stable.

## 2017-12-02 NOTE — PROGRESS NOTE ADULT - PROBLEM SELECTOR PLAN 1
due to recent TURP likely , continue waddell , CBI intermittent   urology follow up , may need to go home with waddell

## 2017-12-02 NOTE — PROGRESS NOTE ADULT - SUBJECTIVE AND OBJECTIVE BOX
PRASHANT MORALES     Chief Complaint: Patient is a 89y old  Male who presents with a chief complaint of Hematuria (01 Dec 2017 16:43)      PAST MEDICAL & SURGICAL HISTORY:  Macular degeneration  UTI (urinary tract infection): 2013  Narcolepsy  Aortic stenosis  GI bleed  HLD (hyperlipidemia)  Depression  Anemia  Endocarditis: 2013  BPH (benign prostatic hyperplasia)  Fatigue  S/P TAVR (transcatheter aortic valve replacement)  S/P cataract extraction and insertion of intraocular lens: bilat      HPI/OVERNIGHT EVENTS: Elderly male in no distress. CBI is running with pale pink urine.    MEDICATIONS  (STANDING):  ascorbic acid 250 milliGRAM(s) Oral two times a day  cefTRIAXone   IVPB 1 Gram(s) IV Intermittent every 24 hours  escitalopram 20 milliGRAM(s) Oral daily  ferrous    sulfate 325 milliGRAM(s) Oral two times a day with meals  finasteride 5 milliGRAM(s) Oral daily  losartan 50 milliGRAM(s) Oral daily  metoprolol     tartrate 50 milliGRAM(s) Oral after dinner  metoprolol     tartrate 25 milliGRAM(s) Oral before breakfast  modafinil 200 milliGRAM(s) Oral daily  saccharomyces boulardii 250 milliGRAM(s) Oral two times a day  tamsulosin 0.4 milliGRAM(s) Oral at bedtime      Vital Signs Last 24 Hrs  T(C): 36.8 (02 Dec 2017 09:03), Max: 37 (01 Dec 2017 15:00)  T(F): 98.2 (02 Dec 2017 09:03), Max: 98.6 (01 Dec 2017 15:00)  HR: 65 (02 Dec 2017 09:03) (65 - 86)  BP: 114/59 (02 Dec 2017 10:54) (114/59 - 195/75)  BP(mean): --  RR: 18 (02 Dec 2017 09:03) (18 - 18)  SpO2: 97% (02 Dec 2017 09:03) (97% - 97%)    PHYSICAL EXAM:  Constitutional: NAD, well-groomed, well-developed  HEENT: PERRLA, EOMI, Normal Hearing, MMM  Neck: No LAD, No JVD  Back: Normal spine flexure, No CVA tenderness  Respiratory: CTAB Cardiovascular: S1 and S2, RRR, no M/G/R  Gastrointestinal: BS+, soft, NT/ND  Extremities: No peripheral edema  Vascular: 2+ peripheral pulses  Neurological: A/O x 3, no focal deficits   nadira is in place    CAPILLARY BLOOD GLUCOSE    LABS:    12-02    133<L>  |  93<L>  |  20.0  ----------------------------<  107  4.4   |  29.0  |  0.65    Ca    8.2<L>      02 Dec 2017 06:38            RADIOLOGY & ADDITIONAL TESTS:

## 2017-12-03 VITALS
SYSTOLIC BLOOD PRESSURE: 125 MMHG | TEMPERATURE: 99 F | DIASTOLIC BLOOD PRESSURE: 58 MMHG | OXYGEN SATURATION: 98 % | HEART RATE: 80 BPM | RESPIRATION RATE: 17 BRPM

## 2017-12-03 PROCEDURE — 99239 HOSP IP/OBS DSCHRG MGMT >30: CPT

## 2017-12-03 RX ORDER — HYDRALAZINE HCL 50 MG
10 TABLET ORAL EVERY 8 HOURS
Qty: 0 | Refills: 0 | Status: DISCONTINUED | OUTPATIENT
Start: 2017-12-03 | End: 2017-12-03

## 2017-12-03 RX ADMIN — Medication 325 MILLIGRAM(S): at 09:18

## 2017-12-03 RX ADMIN — Medication 250 MILLIGRAM(S): at 06:37

## 2017-12-03 RX ADMIN — Medication 25 MILLIGRAM(S): at 06:44

## 2017-12-03 RX ADMIN — FINASTERIDE 5 MILLIGRAM(S): 5 TABLET, FILM COATED ORAL at 11:49

## 2017-12-03 RX ADMIN — Medication 10 MILLIGRAM(S): at 09:18

## 2017-12-03 RX ADMIN — LOSARTAN POTASSIUM 50 MILLIGRAM(S): 100 TABLET, FILM COATED ORAL at 06:37

## 2017-12-03 RX ADMIN — ESCITALOPRAM OXALATE 20 MILLIGRAM(S): 10 TABLET, FILM COATED ORAL at 11:49

## 2017-12-03 NOTE — PROGRESS NOTE ADULT - PROBLEM SELECTOR PLAN 6
improved , cont iv hydration BMP in  am ordered
improved , cont iv hydration

## 2017-12-03 NOTE — PROGRESS NOTE ADULT - ASSESSMENT
89 year old male with PMH of HTN , BPH s/p TURP on 11/17 admitted for gross  hematuria, improving with waddell and CBI , urology on board , uncontrolled HTN losartan added. Dr Mckeon suggest discharge once stable.  12/3 Stable for discharge, follow up with Urology.

## 2017-12-03 NOTE — PROGRESS NOTE ADULT - PROBLEM SELECTOR PLAN 2
better , cont losartan and metoprolol
s/p recent TURP
s/p recent TURP , urology follow up
s/p recent TURP , urology follow up
better controlled, cont losartan and metoprolol

## 2017-12-03 NOTE — PROGRESS NOTE ADULT - PROBLEM SELECTOR PROBLEM 4
Macular degeneration
Essential hypertension
Essential hypertension
Macular degeneration

## 2017-12-03 NOTE — PROGRESS NOTE ADULT - PROBLEM SELECTOR PLAN 4
unsteady on walking, needs walker
unsteady on walking, needs walker
continue metoprolol
continue metoprolol, uncontrolled add low dose losartan
unsteady on walking, needs walker
unsteady on walking

## 2017-12-03 NOTE — PROGRESS NOTE ADULT - SUBJECTIVE AND OBJECTIVE BOX
INTERVAL HPI/OVERNIGHT EVENTS: Patient without complaints.  He reports that he is being discharged today.    MEDICATIONS  (STANDING):  ascorbic acid 250 milliGRAM(s) Oral two times a day  escitalopram 20 milliGRAM(s) Oral daily  ferrous    sulfate 325 milliGRAM(s) Oral two times a day with meals  finasteride 5 milliGRAM(s) Oral daily  losartan 50 milliGRAM(s) Oral daily  metoprolol     tartrate 50 milliGRAM(s) Oral after dinner  metoprolol     tartrate 25 milliGRAM(s) Oral before breakfast  saccharomyces boulardii 250 milliGRAM(s) Oral two times a day  tamsulosin 0.4 milliGRAM(s) Oral at bedtime    MEDICATIONS  (PRN):  docusate sodium 100 milliGRAM(s) Oral three times a day PRN Constipation  hydrALAZINE Injectable 10 milliGRAM(s) IV Push every 8 hours PRN systolic bp > 160 mm/hg      Allergies    No Known Allergies    Intolerances        Vital Signs Last 24 Hrs  T(C): 36.6 (03 Dec 2017 07:40), Max: 36.7 (02 Dec 2017 16:25)  T(F): 97.8 (03 Dec 2017 07:40), Max: 98 (02 Dec 2017 16:25)  HR: 57 (03 Dec 2017 10:41) (57 - 74)  BP: 111/68 (03 Dec 2017 10:41) (111/60 - 180/64)  BP(mean): --  RR: 17 (03 Dec 2017 10:41) (17 - 18)  SpO2: 99% (03 Dec 2017 10:41) (96% - 99%)    ROS:  as per HPI     ON PE:  General: Well developed; well nourished; in no acute distress  Head: Normocephalic; atraumatic  Respiratory: No tachypnea  Gastrointestinal: Soft non-tender non-distended;  Genitourinary: No costovertebral angle tenderness.  Urinary bladder is clinically not distended  Yu clear off CBI  Neurological: Alert and oriented x4  Skin: Warm and dry. No acute rash  Psychiatric: Cooperative and appropriate      LABS:    12-02    133<L>  |  93<L>  |  20.0  ----------------------------<  107  4.4   |  29.0  |  0.65    Ca    8.2<L>      02 Dec 2017 06:38            RADIOLOGY & ADDITIONAL TESTS:

## 2017-12-03 NOTE — PROGRESS NOTE ADULT - SUBJECTIVE AND OBJECTIVE BOX
I was called to see this patient who was on CBI, being discharged today. Patient has sanguinous blood in bag strapped to his leg. No bright red blood in bag. Patient is in NAD at this time. I spoke to Dr Velazquez regarding the problem. He is aware of same and he has already spoken to Urologist earlier this PM regarding the CBI and slightly blood-tinged urine in bag. Patient will be discharged today and followed-up with Dr. Neely as out-patient. I was called to see this patient who was on CBI, being discharged today. Patient has sanguinous blood in bag strapped to his leg. No bright red blood in bag. Patient is in NAD at this time. I spoke to Dr Velazquez regarding the problem. He is aware of same and he has already spoken to Urologist earlier this PM regarding the CBI and slightly blood-tinged urine in bag. Patient will be discharged today and followed-up with Dr. Hill as out-patient.

## 2017-12-03 NOTE — PROGRESS NOTE ADULT - PROBLEM SELECTOR PROBLEM 2
Essential hypertension
Benign prostatic hyperplasia, unspecified whether lower urinary tract symptoms present
Benign prostatic hyperplasia, unspecified whether lower urinary tract symptoms present
Essential hypertension
Benign prostatic hyperplasia, unspecified whether lower urinary tract symptoms present

## 2017-12-03 NOTE — PROGRESS NOTE ADULT - SUBJECTIVE AND OBJECTIVE BOX
PRASHANT MORALES     Chief Complaint: Patient is a 89y old  Male who presents with a chief complaint of Hematuria (01 Dec 2017 16:43)      PAST MEDICAL & SURGICAL HISTORY:  Macular degeneration  UTI (urinary tract infection): 2013  Narcolepsy  Aortic stenosis  GI bleed  HLD (hyperlipidemia)  Depression  Anemia  Endocarditis: 2013  BPH (benign prostatic hyperplasia)  Fatigue  S/P TAVR (transcatheter aortic valve replacement)  S/P cataract extraction and insertion of intraocular lens: bilat      HPI/OVERNIGHT EVENTS:    MEDICATIONS  (STANDING):  ascorbic acid 250 milliGRAM(s) Oral two times a day  escitalopram 20 milliGRAM(s) Oral daily  ferrous    sulfate 325 milliGRAM(s) Oral two times a day with meals  finasteride 5 milliGRAM(s) Oral daily  losartan 50 milliGRAM(s) Oral daily  metoprolol     tartrate 50 milliGRAM(s) Oral after dinner  metoprolol     tartrate 25 milliGRAM(s) Oral before breakfast  saccharomyces boulardii 250 milliGRAM(s) Oral two times a day  tamsulosin 0.4 milliGRAM(s) Oral at bedtime      Vital Signs Last 24 Hrs  T(C): 36.6 (03 Dec 2017 07:40), Max: 36.7 (02 Dec 2017 16:25)  T(F): 97.8 (03 Dec 2017 07:40), Max: 98 (02 Dec 2017 16:25)  HR: 57 (03 Dec 2017 10:41) (57 - 74)  BP: 111/68 (03 Dec 2017 10:41) (111/60 - 180/64)  BP(mean): --  RR: 17 (03 Dec 2017 10:41) (17 - 18)  SpO2: 99% (03 Dec 2017 10:41) (96% - 99%)    PHYSICAL EXAM:  Constitutional: NAD, well-groomed, well-developed  HEENT: PERRLA, EOMI, Normal Hearing, MMM  Neck: No LAD, No JVD  Back: Normal spine flexure, No CVA tenderness  Respiratory: CTAB Cardiovascular: S1 and S2, RRR, no M/G/R  Gastrointestinal: BS+, soft, NT/ND  Extremities: No peripheral edema  Vascular: 2+ peripheral pulses  Neurological: A/O x 3,    Yu in place    CAPILLARY BLOOD GLUCOSE    LABS:    12-02    133<L>  |  93<L>  |  20.0  ----------------------------<  107  4.4   |  29.0  |  0.65    Ca    8.2<L>      02 Dec 2017 06:38            RADIOLOGY & ADDITIONAL TESTS:

## 2017-12-03 NOTE — PROGRESS NOTE ADULT - PROBLEM SELECTOR PROBLEM 5
Aortic valve stenosis, etiology of cardiac valve disease unspecified

## 2017-12-03 NOTE — PROGRESS NOTE ADULT - PROBLEM SELECTOR PROBLEM 1
Gross hematuria

## 2017-12-03 NOTE — PROGRESS NOTE ADULT - PROBLEM SELECTOR PROBLEM 3
Benign prostatic hyperplasia, unspecified whether lower urinary tract symptoms present
Aortic valve replaced
Aortic valve replaced
Benign prostatic hyperplasia, unspecified whether lower urinary tract symptoms present
Aortic valve replaced

## 2017-12-12 ENCOUNTER — APPOINTMENT (OUTPATIENT)
Dept: INTERNAL MEDICINE | Facility: CLINIC | Age: 82
End: 2017-12-12
Payer: MEDICARE

## 2017-12-12 PROCEDURE — 99214 OFFICE O/P EST MOD 30 MIN: CPT

## 2017-12-19 PROCEDURE — 97163 PT EVAL HIGH COMPLEX 45 MIN: CPT

## 2017-12-19 PROCEDURE — 87040 BLOOD CULTURE FOR BACTERIA: CPT

## 2017-12-19 PROCEDURE — 87086 URINE CULTURE/COLONY COUNT: CPT

## 2017-12-19 PROCEDURE — 81001 URINALYSIS AUTO W/SCOPE: CPT

## 2017-12-19 PROCEDURE — 85610 PROTHROMBIN TIME: CPT

## 2017-12-19 PROCEDURE — 85018 HEMOGLOBIN: CPT

## 2017-12-19 PROCEDURE — 83735 ASSAY OF MAGNESIUM: CPT

## 2017-12-19 PROCEDURE — 99285 EMERGENCY DEPT VISIT HI MDM: CPT | Mod: 25

## 2017-12-19 PROCEDURE — 97110 THERAPEUTIC EXERCISES: CPT

## 2017-12-19 PROCEDURE — 51702 INSERT TEMP BLADDER CATH: CPT

## 2017-12-19 PROCEDURE — 97116 GAIT TRAINING THERAPY: CPT

## 2017-12-19 PROCEDURE — 76857 US EXAM PELVIC LIMITED: CPT

## 2017-12-19 PROCEDURE — 84100 ASSAY OF PHOSPHORUS: CPT

## 2017-12-19 PROCEDURE — 80053 COMPREHEN METABOLIC PANEL: CPT

## 2017-12-19 PROCEDURE — 85730 THROMBOPLASTIN TIME PARTIAL: CPT

## 2017-12-19 PROCEDURE — 85027 COMPLETE CBC AUTOMATED: CPT

## 2017-12-19 PROCEDURE — 36415 COLL VENOUS BLD VENIPUNCTURE: CPT

## 2017-12-19 PROCEDURE — 83605 ASSAY OF LACTIC ACID: CPT

## 2017-12-19 PROCEDURE — 80048 BASIC METABOLIC PNL TOTAL CA: CPT

## 2018-01-10 ENCOUNTER — APPOINTMENT (OUTPATIENT)
Dept: UROLOGY | Facility: CLINIC | Age: 83
End: 2018-01-10
Payer: MEDICARE

## 2018-01-10 PROCEDURE — 99024 POSTOP FOLLOW-UP VISIT: CPT

## 2018-01-16 ENCOUNTER — APPOINTMENT (OUTPATIENT)
Dept: INTERNAL MEDICINE | Facility: CLINIC | Age: 83
End: 2018-01-16
Payer: MEDICARE

## 2018-01-16 PROCEDURE — 36415 COLL VENOUS BLD VENIPUNCTURE: CPT

## 2018-01-16 PROCEDURE — 99214 OFFICE O/P EST MOD 30 MIN: CPT | Mod: 25

## 2018-04-12 NOTE — H&P PST ADULT - VENOUS THROMBOEMBOLISM AGE
History and Physical reviewed; I have examined the patient and there are no pertinent changes.     Keaton Forman MD   4:29 PM 4/12/2018 over 75 yrs

## 2018-06-05 ENCOUNTER — APPOINTMENT (OUTPATIENT)
Dept: INTERNAL MEDICINE | Facility: CLINIC | Age: 83
End: 2018-06-05
Payer: MEDICARE

## 2018-06-05 PROCEDURE — 99214 OFFICE O/P EST MOD 30 MIN: CPT | Mod: 25

## 2018-06-05 PROCEDURE — 36415 COLL VENOUS BLD VENIPUNCTURE: CPT

## 2018-06-28 ENCOUNTER — APPOINTMENT (OUTPATIENT)
Dept: GASTROENTEROLOGY | Facility: CLINIC | Age: 83
End: 2018-06-28
Payer: MEDICARE

## 2018-06-28 VITALS
RESPIRATION RATE: 16 BRPM | WEIGHT: 140 LBS | OXYGEN SATURATION: 96 % | HEIGHT: 66 IN | SYSTOLIC BLOOD PRESSURE: 126 MMHG | BODY MASS INDEX: 22.5 KG/M2 | HEART RATE: 66 BPM | DIASTOLIC BLOOD PRESSURE: 58 MMHG

## 2018-06-28 PROCEDURE — 82270 OCCULT BLOOD FECES: CPT

## 2018-06-28 PROCEDURE — 99204 OFFICE O/P NEW MOD 45 MIN: CPT

## 2018-06-28 RX ORDER — AMOXICILLIN AND CLAVULANATE POTASSIUM 875; 125 MG/1; MG/1
875-125 TABLET, COATED ORAL
Qty: 6 | Refills: 0 | Status: DISCONTINUED | COMMUNITY
Start: 2017-11-07 | End: 2018-06-28

## 2018-06-28 RX ORDER — DONEPEZIL HYDROCHLORIDE 5 MG/1
5 TABLET ORAL
Qty: 30 | Refills: 0 | Status: DISCONTINUED | COMMUNITY
Start: 2018-03-05

## 2018-07-03 ENCOUNTER — CLINICAL ADVICE (OUTPATIENT)
Age: 83
End: 2018-07-03

## 2018-07-11 ENCOUNTER — TRANSCRIPTION ENCOUNTER (OUTPATIENT)
Age: 83
End: 2018-07-11

## 2018-08-28 ENCOUNTER — MEDICATION RENEWAL (OUTPATIENT)
Age: 83
End: 2018-08-28

## 2018-11-26 PROBLEM — N39.0 URINARY TRACT INFECTION, SITE NOT SPECIFIED: Chronic | Status: ACTIVE | Noted: 2017-11-03

## 2018-11-26 PROBLEM — G47.419 NARCOLEPSY WITHOUT CATAPLEXY: Chronic | Status: ACTIVE | Noted: 2017-11-03

## 2018-11-26 PROBLEM — H35.30 UNSPECIFIED MACULAR DEGENERATION: Chronic | Status: ACTIVE | Noted: 2017-11-03

## 2018-12-17 ENCOUNTER — APPOINTMENT (OUTPATIENT)
Dept: INTERNAL MEDICINE | Facility: CLINIC | Age: 83
End: 2018-12-17
Payer: MEDICARE

## 2018-12-17 VITALS
DIASTOLIC BLOOD PRESSURE: 60 MMHG | WEIGHT: 149 LBS | HEIGHT: 66 IN | BODY MASS INDEX: 23.95 KG/M2 | SYSTOLIC BLOOD PRESSURE: 130 MMHG

## 2018-12-17 PROCEDURE — 99213 OFFICE O/P EST LOW 20 MIN: CPT | Mod: 25

## 2018-12-17 PROCEDURE — 36415 COLL VENOUS BLD VENIPUNCTURE: CPT

## 2018-12-17 RX ORDER — VIT C/E/ZN/COPPR/LUTEIN/ZEAXAN 250MG-90MG
CAPSULE ORAL
Refills: 0 | Status: ACTIVE | COMMUNITY

## 2018-12-17 RX ORDER — LOSARTAN POTASSIUM 50 MG/1
50 TABLET, FILM COATED ORAL
Qty: 30 | Refills: 0 | Status: DISCONTINUED | COMMUNITY
Start: 2018-05-19 | End: 2018-12-17

## 2018-12-17 NOTE — PHYSICAL EXAM

## 2018-12-17 NOTE — HISTORY OF PRESENT ILLNESS
[FreeTextEntry1] : F/U  on Cholesterol  &  B/P [de-identified] : This is a routine followup for this 89-year-old male for hypertension. He is status post TAVR in the past and recently saw his cardiologist where he had a normal echocardiogram and evaluation. He recently has been diagnosed with mild late onset Alzheimer's per the son after extensive evaluation for an MRI and is currently on medication to slow the progression.\par \par The patient offers no other complaints

## 2018-12-17 NOTE — PLAN
[FreeTextEntry1] : Patient examined and adjustments to therapy for HBP not need All labs reviewed with patient as well. Review of systems and physical exam discussed with patient. Care plan for future followups discussed with patient. All issues of health for the current year discussed in depth with patient who was conversant and all relevant issues addressed.\par \par \par Patient has peripheral edema most likely due to venous insufficiency especially with a normal echocardiogram and ejection fraction. I suggested elevating the legs on an ottoman and also mild compression over-the-counter stockings. Routine laboratory will be drawn as he was mildly hyponatremic at last visit.\par \par All issues regarding patient's health and medical problems have been discussed. The patient understands and concurs with the treatment plan.

## 2018-12-18 LAB — TSH SERPL-ACNC: 1.29 UIU/ML

## 2018-12-19 LAB
ALBUMIN SERPL ELPH-MCNC: 4.2 G/DL
ALP BLD-CCNC: 55 U/L
ALT SERPL-CCNC: 16 U/L
ANION GAP SERPL CALC-SCNC: 13 MMOL/L
AST SERPL-CCNC: 22 U/L
BILIRUB SERPL-MCNC: 0.2 MG/DL
BUN SERPL-MCNC: 25 MG/DL
CALCIUM SERPL-MCNC: 8.9 MG/DL
CHLORIDE SERPL-SCNC: 98 MMOL/L
CO2 SERPL-SCNC: 27 MMOL/L
CREAT SERPL-MCNC: 0.91 MG/DL
GLUCOSE SERPL-MCNC: 108 MG/DL
POTASSIUM SERPL-SCNC: 5 MMOL/L
PROT SERPL-MCNC: 6.8 G/DL
SODIUM SERPL-SCNC: 138 MMOL/L

## 2019-04-23 ENCOUNTER — MEDICATION RENEWAL (OUTPATIENT)
Age: 84
End: 2019-04-23

## 2019-04-26 ENCOUNTER — INPATIENT (INPATIENT)
Facility: HOSPITAL | Age: 84
LOS: 4 days | Discharge: HOME | DRG: 481 | End: 2019-05-01
Attending: STUDENT IN AN ORGANIZED HEALTH CARE EDUCATION/TRAINING PROGRAM | Admitting: STUDENT IN AN ORGANIZED HEALTH CARE EDUCATION/TRAINING PROGRAM
Payer: MEDICARE

## 2019-04-26 ENCOUNTER — TRANSCRIPTION ENCOUNTER (OUTPATIENT)
Age: 84
End: 2019-04-26

## 2019-04-26 VITALS
RESPIRATION RATE: 18 BRPM | HEIGHT: 65 IN | TEMPERATURE: 98 F | OXYGEN SATURATION: 95 % | HEART RATE: 63 BPM | DIASTOLIC BLOOD PRESSURE: 71 MMHG | SYSTOLIC BLOOD PRESSURE: 161 MMHG | WEIGHT: 154.98 LBS

## 2019-04-26 DIAGNOSIS — Z95.2 PRESENCE OF PROSTHETIC HEART VALVE: Chronic | ICD-10-CM

## 2019-04-26 DIAGNOSIS — Z98.49 CATARACT EXTRACTION STATUS, UNSPECIFIED EYE: Chronic | ICD-10-CM

## 2019-04-26 PROCEDURE — 99285 EMERGENCY DEPT VISIT HI MDM: CPT

## 2019-04-26 PROCEDURE — 93010 ELECTROCARDIOGRAM REPORT: CPT

## 2019-04-26 PROCEDURE — 73552 X-RAY EXAM OF FEMUR 2/>: CPT | Mod: 26,LT

## 2019-04-26 PROCEDURE — 71045 X-RAY EXAM CHEST 1 VIEW: CPT | Mod: 26

## 2019-04-26 PROCEDURE — 73502 X-RAY EXAM HIP UNI 2-3 VIEWS: CPT | Mod: 26,LT

## 2019-04-26 RX ORDER — MORPHINE SULFATE 50 MG/1
2 CAPSULE, EXTENDED RELEASE ORAL ONCE
Qty: 0 | Refills: 0 | Status: DISCONTINUED | OUTPATIENT
Start: 2019-04-26 | End: 2019-04-27

## 2019-04-26 RX ORDER — MORPHINE SULFATE 50 MG/1
2 CAPSULE, EXTENDED RELEASE ORAL
Qty: 0 | Refills: 0 | Status: DISCONTINUED | OUTPATIENT
Start: 2019-04-26 | End: 2019-04-26

## 2019-04-26 NOTE — ED PROVIDER NOTE - OBJECTIVE STATEMENT
89 y/o M pt with PMHx BPH, Alzheimer's, TAVR, TURP presents to the ED c/o LLE pain s/p falling today.  Pt was at home walking to his bed when he bent over, slipped, and fell down, landing on the ground on his L side.  Pt notes L hip and L leg pain since the fall.  Pt's son was in the other room at the time, heard the fall, found pt on the ground, had to help pt stand up.  Son notes pt typically wears non-slip socks, but pt was not wearing these socks at the time of the fall.  At baseline pt ambulates without assistance.  Pt follows up with a cardiologist for LE edema.  Denies LOC, fever, chills, N/V, CP, SOB, back pain, HA.  No further acute complaints at this time.  Cardiologist: Dr. Westbrook

## 2019-04-26 NOTE — ED PROVIDER NOTE - MUSCULOSKELETAL, MLM
Moving all extremities spontaneously. Moving all extremities spontaneously. swelling to L proximal thigh, diminished ROM, limited tenderness.

## 2019-04-26 NOTE — ED PROVIDER NOTE - PROGRESS NOTE DETAILS
Ortho consulted, pt's hx and exam discussed, they will come evaluate pt. Trauma consulted, pt's hx and exam discussed, they will come evaluate pt.

## 2019-04-26 NOTE — ED PROVIDER NOTE - CONSTITUTIONAL, MLM
normal... Awake, non-toxic, and in no apparent distress. Awake, alert, at baseline per son, non-toxic, and in no apparent distress.

## 2019-04-26 NOTE — ED PROVIDER NOTE - ENMT, MLM
Airway patent. Nasal mucosa clear.  Mouth with normal mucosa. Neck supple, FROM. Airway patent. NC AT.  Nasal mucosa clear.  Mouth with normal mucosa. Neck supple, FROM.

## 2019-04-26 NOTE — ED ADULT TRIAGE NOTE - CHIEF COMPLAINT QUOTE
I fell going to bed, landed on left hip, denies syncope, hitting head or LOC, on ASA, difficulty moving left lower extremity. possible deformity

## 2019-04-27 DIAGNOSIS — S72.92XA UNSPECIFIED FRACTURE OF LEFT FEMUR, INITIAL ENCOUNTER FOR CLOSED FRACTURE: ICD-10-CM

## 2019-04-27 LAB
ALBUMIN SERPL ELPH-MCNC: 4.7 G/DL — SIGNIFICANT CHANGE UP (ref 3.3–5.2)
ALP SERPL-CCNC: 58 U/L — SIGNIFICANT CHANGE UP (ref 40–120)
ALT FLD-CCNC: 15 U/L — SIGNIFICANT CHANGE UP
ANION GAP SERPL CALC-SCNC: 10 MMOL/L — SIGNIFICANT CHANGE UP (ref 5–17)
ANION GAP SERPL CALC-SCNC: 12 MMOL/L — SIGNIFICANT CHANGE UP (ref 5–17)
ANION GAP SERPL CALC-SCNC: 13 MMOL/L — SIGNIFICANT CHANGE UP (ref 5–17)
APPEARANCE UR: CLEAR — SIGNIFICANT CHANGE UP
AST SERPL-CCNC: 27 U/L — SIGNIFICANT CHANGE UP
BACTERIA # UR AUTO: ABNORMAL
BASOPHILS # BLD AUTO: 0 K/UL — SIGNIFICANT CHANGE UP (ref 0–0.2)
BASOPHILS NFR BLD AUTO: 0.2 % — SIGNIFICANT CHANGE UP (ref 0–2)
BILIRUB SERPL-MCNC: 0.4 MG/DL — SIGNIFICANT CHANGE UP (ref 0.4–2)
BILIRUB UR-MCNC: NEGATIVE — SIGNIFICANT CHANGE UP
BLD GP AB SCN SERPL QL: SIGNIFICANT CHANGE UP
BUN SERPL-MCNC: 29 MG/DL — HIGH (ref 8–20)
BUN SERPL-MCNC: 29 MG/DL — HIGH (ref 8–20)
BUN SERPL-MCNC: 30 MG/DL — HIGH (ref 8–20)
CALCIUM SERPL-MCNC: 8.2 MG/DL — LOW (ref 8.6–10.2)
CALCIUM SERPL-MCNC: 8.6 MG/DL — SIGNIFICANT CHANGE UP (ref 8.6–10.2)
CALCIUM SERPL-MCNC: 9.2 MG/DL — SIGNIFICANT CHANGE UP (ref 8.6–10.2)
CHLORIDE SERPL-SCNC: 100 MMOL/L — SIGNIFICANT CHANGE UP (ref 98–107)
CHLORIDE SERPL-SCNC: 96 MMOL/L — LOW (ref 98–107)
CHLORIDE SERPL-SCNC: 99 MMOL/L — SIGNIFICANT CHANGE UP (ref 98–107)
CK MB CFR SERPL CALC: 6.7 NG/ML — SIGNIFICANT CHANGE UP (ref 0–6.7)
CK SERPL-CCNC: 163 U/L — SIGNIFICANT CHANGE UP (ref 30–200)
CO2 SERPL-SCNC: 25 MMOL/L — SIGNIFICANT CHANGE UP (ref 22–29)
CO2 SERPL-SCNC: 26 MMOL/L — SIGNIFICANT CHANGE UP (ref 22–29)
CO2 SERPL-SCNC: 28 MMOL/L — SIGNIFICANT CHANGE UP (ref 22–29)
COLOR SPEC: YELLOW — SIGNIFICANT CHANGE UP
CREAT SERPL-MCNC: 0.79 MG/DL — SIGNIFICANT CHANGE UP (ref 0.5–1.3)
CREAT SERPL-MCNC: 0.85 MG/DL — SIGNIFICANT CHANGE UP (ref 0.5–1.3)
CREAT SERPL-MCNC: 0.93 MG/DL — SIGNIFICANT CHANGE UP (ref 0.5–1.3)
DIFF PNL FLD: ABNORMAL
EOSINOPHIL # BLD AUTO: 0 K/UL — SIGNIFICANT CHANGE UP (ref 0–0.5)
EOSINOPHIL NFR BLD AUTO: 0.1 % — SIGNIFICANT CHANGE UP (ref 0–5)
EPI CELLS # UR: SIGNIFICANT CHANGE UP
GLUCOSE SERPL-MCNC: 133 MG/DL — HIGH (ref 70–115)
GLUCOSE SERPL-MCNC: 152 MG/DL — HIGH (ref 70–115)
GLUCOSE SERPL-MCNC: 176 MG/DL — HIGH (ref 70–115)
GLUCOSE UR QL: NEGATIVE MG/DL — SIGNIFICANT CHANGE UP
HCT VFR BLD CALC: 26.6 % — LOW (ref 42–52)
HCT VFR BLD CALC: 31.8 % — LOW (ref 42–52)
HCT VFR BLD CALC: 40.3 % — LOW (ref 42–52)
HGB BLD-MCNC: 10.1 G/DL — LOW (ref 14–18)
HGB BLD-MCNC: 13 G/DL — LOW (ref 14–18)
HGB BLD-MCNC: 8.5 G/DL — LOW (ref 14–18)
INR BLD: 1.02 RATIO — SIGNIFICANT CHANGE UP (ref 0.88–1.16)
KETONES UR-MCNC: ABNORMAL
LEUKOCYTE ESTERASE UR-ACNC: NEGATIVE — SIGNIFICANT CHANGE UP
LIDOCAIN IGE QN: 21 U/L — LOW (ref 22–51)
LYMPHOCYTES # BLD AUTO: 0.5 K/UL — LOW (ref 1–4.8)
LYMPHOCYTES # BLD AUTO: 6.1 % — LOW (ref 20–55)
MAGNESIUM SERPL-MCNC: 1.9 MG/DL — SIGNIFICANT CHANGE UP (ref 1.6–2.6)
MAGNESIUM SERPL-MCNC: 2.1 MG/DL — SIGNIFICANT CHANGE UP (ref 1.6–2.6)
MCHC RBC-ENTMCNC: 28.4 PG — SIGNIFICANT CHANGE UP (ref 27–31)
MCHC RBC-ENTMCNC: 29 PG — SIGNIFICANT CHANGE UP (ref 27–31)
MCHC RBC-ENTMCNC: 29.2 PG — SIGNIFICANT CHANGE UP (ref 27–31)
MCHC RBC-ENTMCNC: 31.8 G/DL — LOW (ref 32–36)
MCHC RBC-ENTMCNC: 32 G/DL — SIGNIFICANT CHANGE UP (ref 32–36)
MCHC RBC-ENTMCNC: 32.3 G/DL — SIGNIFICANT CHANGE UP (ref 32–36)
MCV RBC AUTO: 89.3 FL — SIGNIFICANT CHANGE UP (ref 80–94)
MCV RBC AUTO: 90.6 FL — SIGNIFICANT CHANGE UP (ref 80–94)
MCV RBC AUTO: 90.8 FL — SIGNIFICANT CHANGE UP (ref 80–94)
MONOCYTES # BLD AUTO: 0.6 K/UL — SIGNIFICANT CHANGE UP (ref 0–0.8)
MONOCYTES NFR BLD AUTO: 8 % — SIGNIFICANT CHANGE UP (ref 3–10)
NEUTROPHILS # BLD AUTO: 6.9 K/UL — SIGNIFICANT CHANGE UP (ref 1.8–8)
NEUTROPHILS NFR BLD AUTO: 85.5 % — HIGH (ref 37–73)
NITRITE UR-MCNC: NEGATIVE — SIGNIFICANT CHANGE UP
PH UR: 5 — SIGNIFICANT CHANGE UP (ref 5–8)
PHOSPHATE SERPL-MCNC: 3.9 MG/DL — SIGNIFICANT CHANGE UP (ref 2.4–4.7)
PLATELET # BLD AUTO: 150 K/UL — SIGNIFICANT CHANGE UP (ref 150–400)
PLATELET # BLD AUTO: 161 K/UL — SIGNIFICANT CHANGE UP (ref 150–400)
PLATELET # BLD AUTO: 186 K/UL — SIGNIFICANT CHANGE UP (ref 150–400)
POTASSIUM SERPL-MCNC: 4.4 MMOL/L — SIGNIFICANT CHANGE UP (ref 3.5–5.3)
POTASSIUM SERPL-MCNC: 4.7 MMOL/L — SIGNIFICANT CHANGE UP (ref 3.5–5.3)
POTASSIUM SERPL-MCNC: 4.8 MMOL/L — SIGNIFICANT CHANGE UP (ref 3.5–5.3)
POTASSIUM SERPL-SCNC: 4.4 MMOL/L — SIGNIFICANT CHANGE UP (ref 3.5–5.3)
POTASSIUM SERPL-SCNC: 4.7 MMOL/L — SIGNIFICANT CHANGE UP (ref 3.5–5.3)
POTASSIUM SERPL-SCNC: 4.8 MMOL/L — SIGNIFICANT CHANGE UP (ref 3.5–5.3)
PROT SERPL-MCNC: 8.1 G/DL — SIGNIFICANT CHANGE UP (ref 6.6–8.7)
PROT UR-MCNC: 30 MG/DL
PROTHROM AB SERPL-ACNC: 11.7 SEC — SIGNIFICANT CHANGE UP (ref 10–12.9)
RBC # BLD: 2.93 M/UL — LOW (ref 4.6–6.2)
RBC # BLD: 3.56 M/UL — LOW (ref 4.6–6.2)
RBC # BLD: 4.45 M/UL — LOW (ref 4.6–6.2)
RBC # FLD: 14.2 % — SIGNIFICANT CHANGE UP (ref 11–15.6)
RBC # FLD: 14.2 % — SIGNIFICANT CHANGE UP (ref 11–15.6)
RBC # FLD: 14.4 % — SIGNIFICANT CHANGE UP (ref 11–15.6)
RBC CASTS # UR COMP ASSIST: SIGNIFICANT CHANGE UP /HPF (ref 0–4)
SODIUM SERPL-SCNC: 135 MMOL/L — SIGNIFICANT CHANGE UP (ref 135–145)
SODIUM SERPL-SCNC: 137 MMOL/L — SIGNIFICANT CHANGE UP (ref 135–145)
SODIUM SERPL-SCNC: 137 MMOL/L — SIGNIFICANT CHANGE UP (ref 135–145)
SP GR SPEC: 1.02 — SIGNIFICANT CHANGE UP (ref 1.01–1.02)
TROPONIN T SERPL-MCNC: <0.01 NG/ML — SIGNIFICANT CHANGE UP (ref 0–0.06)
TYPE + AB SCN PNL BLD: SIGNIFICANT CHANGE UP
UROBILINOGEN FLD QL: NEGATIVE MG/DL — SIGNIFICANT CHANGE UP
WBC # BLD: 8 K/UL — SIGNIFICANT CHANGE UP (ref 4.8–10.8)
WBC # BLD: 8.8 K/UL — SIGNIFICANT CHANGE UP (ref 4.8–10.8)
WBC # BLD: 9.3 K/UL — SIGNIFICANT CHANGE UP (ref 4.8–10.8)
WBC # FLD AUTO: 8 K/UL — SIGNIFICANT CHANGE UP (ref 4.8–10.8)
WBC # FLD AUTO: 8.8 K/UL — SIGNIFICANT CHANGE UP (ref 4.8–10.8)
WBC # FLD AUTO: 9.3 K/UL — SIGNIFICANT CHANGE UP (ref 4.8–10.8)
WBC UR QL: SIGNIFICANT CHANGE UP

## 2019-04-27 PROCEDURE — 70450 CT HEAD/BRAIN W/O DYE: CPT | Mod: 26

## 2019-04-27 PROCEDURE — 99222 1ST HOSP IP/OBS MODERATE 55: CPT | Mod: 57

## 2019-04-27 PROCEDURE — 27506 TREATMENT OF THIGH FRACTURE: CPT | Mod: LT

## 2019-04-27 PROCEDURE — 73551 X-RAY EXAM OF FEMUR 1: CPT | Mod: 26,LT

## 2019-04-27 PROCEDURE — 73501 X-RAY EXAM HIP UNI 1 VIEW: CPT | Mod: 26,LT

## 2019-04-27 PROCEDURE — 99222 1ST HOSP IP/OBS MODERATE 55: CPT

## 2019-04-27 PROCEDURE — 93306 TTE W/DOPPLER COMPLETE: CPT | Mod: 26

## 2019-04-27 PROCEDURE — 76000 FLUOROSCOPY <1 HR PHYS/QHP: CPT | Mod: 26

## 2019-04-27 PROCEDURE — 74177 CT ABD & PELVIS W/CONTRAST: CPT | Mod: 26

## 2019-04-27 RX ORDER — ACETAMINOPHEN 500 MG
650 TABLET ORAL EVERY 6 HOURS
Qty: 0 | Refills: 0 | Status: DISCONTINUED | OUTPATIENT
Start: 2019-04-27 | End: 2019-05-01

## 2019-04-27 RX ORDER — DOCUSATE SODIUM 100 MG
100 CAPSULE ORAL THREE TIMES A DAY
Qty: 0 | Refills: 0 | Status: DISCONTINUED | OUTPATIENT
Start: 2019-04-27 | End: 2019-05-01

## 2019-04-27 RX ORDER — CEFAZOLIN SODIUM 1 G
2000 VIAL (EA) INJECTION ONCE
Qty: 0 | Refills: 0 | Status: DISCONTINUED | OUTPATIENT
Start: 2019-04-27 | End: 2019-04-30

## 2019-04-27 RX ORDER — MAGNESIUM HYDROXIDE 400 MG/1
30 TABLET, CHEWABLE ORAL DAILY
Qty: 0 | Refills: 0 | Status: DISCONTINUED | OUTPATIENT
Start: 2019-04-27 | End: 2019-05-01

## 2019-04-27 RX ORDER — FINASTERIDE 5 MG/1
5 TABLET, FILM COATED ORAL DAILY
Qty: 0 | Refills: 0 | Status: DISCONTINUED | OUTPATIENT
Start: 2019-04-27 | End: 2019-05-01

## 2019-04-27 RX ORDER — TRANEXAMIC ACID 100 MG/ML
1000 INJECTION, SOLUTION INTRAVENOUS ONCE
Qty: 0 | Refills: 0 | Status: COMPLETED | OUTPATIENT
Start: 2019-04-27 | End: 2019-04-27

## 2019-04-27 RX ORDER — DIPHENHYDRAMINE HCL 50 MG
25 CAPSULE ORAL EVERY 4 HOURS
Qty: 0 | Refills: 0 | Status: DISCONTINUED | OUTPATIENT
Start: 2019-04-27 | End: 2019-05-01

## 2019-04-27 RX ORDER — METOPROLOL TARTRATE 50 MG
25 TABLET ORAL
Qty: 0 | Refills: 0 | Status: DISCONTINUED | OUTPATIENT
Start: 2019-04-27 | End: 2019-05-01

## 2019-04-27 RX ORDER — METOPROLOL TARTRATE 50 MG
1 TABLET ORAL
Qty: 0 | Refills: 0 | COMMUNITY

## 2019-04-27 RX ORDER — ENOXAPARIN SODIUM 100 MG/ML
30 INJECTION SUBCUTANEOUS EVERY 12 HOURS
Qty: 0 | Refills: 0 | Status: DISCONTINUED | OUTPATIENT
Start: 2019-04-29 | End: 2019-04-29

## 2019-04-27 RX ORDER — MAGNESIUM SULFATE 500 MG/ML
2 VIAL (ML) INJECTION ONCE
Qty: 0 | Refills: 0 | Status: COMPLETED | OUTPATIENT
Start: 2019-04-27 | End: 2019-04-27

## 2019-04-27 RX ORDER — FERROUS SULFATE 325(65) MG
325 TABLET ORAL
Qty: 0 | Refills: 0 | Status: DISCONTINUED | OUTPATIENT
Start: 2019-04-27 | End: 2019-05-01

## 2019-04-27 RX ORDER — CEFAZOLIN SODIUM 1 G
2000 VIAL (EA) INJECTION EVERY 8 HOURS
Qty: 0 | Refills: 0 | Status: COMPLETED | OUTPATIENT
Start: 2019-04-27 | End: 2019-04-28

## 2019-04-27 RX ORDER — MODAFINIL 200 MG/1
200 TABLET ORAL DAILY
Qty: 0 | Refills: 0 | Status: DISCONTINUED | OUTPATIENT
Start: 2019-04-27 | End: 2019-05-01

## 2019-04-27 RX ORDER — FENTANYL CITRATE 50 UG/ML
25 INJECTION INTRAVENOUS
Qty: 0 | Refills: 0 | Status: DISCONTINUED | OUTPATIENT
Start: 2019-04-27 | End: 2019-04-27

## 2019-04-27 RX ORDER — MEMANTINE HYDROCHLORIDE 10 MG/1
10 TABLET ORAL
Qty: 0 | Refills: 0 | Status: DISCONTINUED | OUTPATIENT
Start: 2019-04-27 | End: 2019-05-01

## 2019-04-27 RX ORDER — MEMANTINE HYDROCHLORIDE 10 MG/1
28 TABLET ORAL DAILY
Qty: 0 | Refills: 0 | Status: DISCONTINUED | OUTPATIENT
Start: 2019-04-27 | End: 2019-04-27

## 2019-04-27 RX ORDER — OXYCODONE HYDROCHLORIDE 5 MG/1
5 TABLET ORAL EVERY 4 HOURS
Qty: 0 | Refills: 0 | Status: DISCONTINUED | OUTPATIENT
Start: 2019-04-27 | End: 2019-04-30

## 2019-04-27 RX ORDER — MINERAL OIL
133 OIL (ML) MISCELLANEOUS ONCE
Qty: 0 | Refills: 0 | Status: COMPLETED | OUTPATIENT
Start: 2019-04-27 | End: 2019-04-27

## 2019-04-27 RX ORDER — LANOLIN ALCOHOL/MO/W.PET/CERES
5 CREAM (GRAM) TOPICAL AT BEDTIME
Qty: 0 | Refills: 0 | Status: DISCONTINUED | OUTPATIENT
Start: 2019-04-27 | End: 2019-05-01

## 2019-04-27 RX ORDER — MODAFINIL 200 MG/1
1 TABLET ORAL
Qty: 0 | Refills: 0 | COMMUNITY

## 2019-04-27 RX ORDER — ESCITALOPRAM OXALATE 10 MG/1
20 TABLET, FILM COATED ORAL DAILY
Qty: 0 | Refills: 0 | Status: DISCONTINUED | OUTPATIENT
Start: 2019-04-27 | End: 2019-05-01

## 2019-04-27 RX ORDER — FOLIC ACID 0.8 MG
1 TABLET ORAL DAILY
Qty: 0 | Refills: 0 | Status: DISCONTINUED | OUTPATIENT
Start: 2019-04-27 | End: 2019-05-01

## 2019-04-27 RX ORDER — ASPIRIN/CALCIUM CARB/MAGNESIUM 324 MG
81 TABLET ORAL DAILY
Qty: 0 | Refills: 0 | Status: DISCONTINUED | OUTPATIENT
Start: 2019-04-27 | End: 2019-04-27

## 2019-04-27 RX ORDER — SODIUM CHLORIDE 9 MG/ML
1000 INJECTION, SOLUTION INTRAVENOUS
Qty: 0 | Refills: 0 | Status: DISCONTINUED | OUTPATIENT
Start: 2019-04-27 | End: 2019-04-28

## 2019-04-27 RX ORDER — ESCITALOPRAM OXALATE 10 MG/1
1 TABLET, FILM COATED ORAL
Qty: 0 | Refills: 0 | COMMUNITY

## 2019-04-27 RX ORDER — ACETAMINOPHEN 500 MG
650 TABLET ORAL EVERY 6 HOURS
Qty: 0 | Refills: 0 | Status: DISCONTINUED | OUTPATIENT
Start: 2019-04-27 | End: 2019-04-27

## 2019-04-27 RX ORDER — FINASTERIDE 5 MG/1
1 TABLET, FILM COATED ORAL DAILY
Qty: 0 | Refills: 0 | Status: DISCONTINUED | OUTPATIENT
Start: 2019-04-27 | End: 2019-04-27

## 2019-04-27 RX ORDER — ONDANSETRON 8 MG/1
4 TABLET, FILM COATED ORAL ONCE
Qty: 0 | Refills: 0 | Status: DISCONTINUED | OUTPATIENT
Start: 2019-04-27 | End: 2019-04-27

## 2019-04-27 RX ORDER — TAMSULOSIN HYDROCHLORIDE 0.4 MG/1
0.4 CAPSULE ORAL AT BEDTIME
Qty: 0 | Refills: 0 | Status: DISCONTINUED | OUTPATIENT
Start: 2019-04-27 | End: 2019-05-01

## 2019-04-27 RX ORDER — FAMOTIDINE 10 MG/ML
20 INJECTION INTRAVENOUS EVERY 12 HOURS
Qty: 0 | Refills: 0 | Status: DISCONTINUED | OUTPATIENT
Start: 2019-04-27 | End: 2019-05-01

## 2019-04-27 RX ORDER — ONDANSETRON 8 MG/1
4 TABLET, FILM COATED ORAL EVERY 6 HOURS
Qty: 0 | Refills: 0 | Status: DISCONTINUED | OUTPATIENT
Start: 2019-04-27 | End: 2019-05-01

## 2019-04-27 RX ORDER — OXYCODONE HYDROCHLORIDE 5 MG/1
10 TABLET ORAL EVERY 4 HOURS
Qty: 0 | Refills: 0 | Status: DISCONTINUED | OUTPATIENT
Start: 2019-04-27 | End: 2019-04-30

## 2019-04-27 RX ORDER — SODIUM CHLORIDE 9 MG/ML
1000 INJECTION, SOLUTION INTRAVENOUS
Qty: 0 | Refills: 0 | Status: DISCONTINUED | OUTPATIENT
Start: 2019-04-27 | End: 2019-04-27

## 2019-04-27 RX ORDER — HYDROMORPHONE HYDROCHLORIDE 2 MG/ML
0.5 INJECTION INTRAMUSCULAR; INTRAVENOUS; SUBCUTANEOUS
Qty: 0 | Refills: 0 | Status: DISCONTINUED | OUTPATIENT
Start: 2019-04-27 | End: 2019-04-30

## 2019-04-27 RX ADMIN — Medication 25 MILLIGRAM(S): at 06:21

## 2019-04-27 RX ADMIN — TAMSULOSIN HYDROCHLORIDE 0.4 MILLIGRAM(S): 0.4 CAPSULE ORAL at 21:58

## 2019-04-27 RX ADMIN — Medication 25 MILLIGRAM(S): at 17:11

## 2019-04-27 RX ADMIN — FINASTERIDE 5 MILLIGRAM(S): 5 TABLET, FILM COATED ORAL at 17:11

## 2019-04-27 RX ADMIN — Medication 100 MILLIGRAM(S): at 18:12

## 2019-04-27 RX ADMIN — TRANEXAMIC ACID 220 MILLIGRAM(S): 100 INJECTION, SOLUTION INTRAVENOUS at 14:46

## 2019-04-27 RX ADMIN — MORPHINE SULFATE 2 MILLIGRAM(S): 50 CAPSULE, EXTENDED RELEASE ORAL at 00:46

## 2019-04-27 RX ADMIN — Medication 1 TABLET(S): at 17:11

## 2019-04-27 RX ADMIN — MEMANTINE HYDROCHLORIDE 10 MILLIGRAM(S): 10 TABLET ORAL at 17:12

## 2019-04-27 RX ADMIN — TRANEXAMIC ACID 220 MILLIGRAM(S): 100 INJECTION, SOLUTION INTRAVENOUS at 22:04

## 2019-04-27 RX ADMIN — Medication 50 GRAM(S): at 15:50

## 2019-04-27 RX ADMIN — Medication 133 MILLILITER(S): at 20:46

## 2019-04-27 RX ADMIN — MORPHINE SULFATE 2 MILLIGRAM(S): 50 CAPSULE, EXTENDED RELEASE ORAL at 00:00

## 2019-04-27 RX ADMIN — Medication 100 MILLIGRAM(S): at 21:58

## 2019-04-27 RX ADMIN — MEMANTINE HYDROCHLORIDE 10 MILLIGRAM(S): 10 TABLET ORAL at 06:21

## 2019-04-27 RX ADMIN — MODAFINIL 200 MILLIGRAM(S): 200 TABLET ORAL at 17:11

## 2019-04-27 RX ADMIN — ESCITALOPRAM OXALATE 20 MILLIGRAM(S): 10 TABLET, FILM COATED ORAL at 17:11

## 2019-04-27 RX ADMIN — Medication 5 MILLIGRAM(S): at 21:58

## 2019-04-27 RX ADMIN — SODIUM CHLORIDE 85 MILLILITER(S): 9 INJECTION, SOLUTION INTRAVENOUS at 06:21

## 2019-04-27 RX ADMIN — FAMOTIDINE 20 MILLIGRAM(S): 10 INJECTION INTRAVENOUS at 21:58

## 2019-04-27 RX ADMIN — SODIUM CHLORIDE 85 MILLILITER(S): 9 INJECTION, SOLUTION INTRAVENOUS at 15:51

## 2019-04-27 NOTE — BRIEF OPERATIVE NOTE - NSICDXBRIEFPOSTOP_GEN_ALL_CORE_FT
POST-OP DIAGNOSIS:  Displaced comminuted fracture of shaft of left femur 27-Apr-2019 12:52:22  Brady Mckeon

## 2019-04-27 NOTE — CONSULT NOTE ADULT - SUBJECTIVE AND OBJECTIVE BOX
Pt Name: PRASHANT MORALES    MRN: [id number]      Patient is a 89y Male presenting to the emergency department with a chief complaint of Patient is a 89y old  Male who presents with a chief complaint of .      REVIEW OF SYSTEMS    General: Alert, responsive, in NAD    Skin/Breast: No rashes, no pruritis   	  Ophthalmologic: No visual changes. No redness.   	  ENMT:	No discharge. No swelling.    Respiratory and Thorax: No difficulty breathing. No cough.  	   Cardiovascular:	No chest pain. No palpitations.    Gastrointestinal:	 No abdominal pain. No diarrhea.     Genitourinary: No dysuria. No bleeding.    Musculoskeletal: SEE HPI.    Neurological: No sensory or motor changes.     Psychiatric: No anxiety or depression.    Hematology/Lymphatics: No swelling.    Endocrine: No Hx of diabetes.    ROS is otherwise negative.    PAST MEDICAL & SURGICAL HISTORY:  PAST MEDICAL & SURGICAL HISTORY:  Macular degeneration  UTI (urinary tract infection): 2013  Narcolepsy  Aortic stenosis  GI bleed  HLD (hyperlipidemia)  Depression  Anemia  Endocarditis: 2013  BPH (benign prostatic hyperplasia)  Fatigue  S/P TAVR (transcatheter aortic valve replacement)  S/P cataract extraction and insertion of intraocular lens: bilat      Allergies: No Known Allergies      Medications: ceFAZolin   IVPB 2000 milliGRAM(s) IV Intermittent once  lactated ringers. 1000 milliLiter(s) IV Continuous <Continuous>      FAMILY HISTORY:  No pertinent family history in first degree relatives  : non-contributory    Social History:     Ambulation: Walking independently [ ] With Cane [ ] With Walker [ ]  Bedbound [ ]                 Vital Signs Last 24 Hrs  T(C): 36.8 (26 Apr 2019 22:45), Max: 36.8 (26 Apr 2019 22:45)  T(F): 98.3 (26 Apr 2019 22:45), Max: 98.3 (26 Apr 2019 22:45)  HR: 63 (26 Apr 2019 22:45) (63 - 63)  BP: 161/71 (26 Apr 2019 22:45) (161/71 - 161/71)  BP(mean): --  RR: 18 (26 Apr 2019 22:45) (18 - 18)  SpO2: 95% (26 Apr 2019 22:45) (95% - 95%)    Daily Height in cm: 165.1 (26 Apr 2019 22:45)    Daily       PHYSICAL EXAM:      Appearance: Alert, responsive, in no acute distress.    Neurological: Sensation is grossly intact to light touch. 5/5 motor function of all extremities. No focal deficits or weaknesses found.    Skin: no rash on visible skin. Skin is clean, dry and intact. No bleeding. No abrasions. No ulcerations.    Vascular: 2+ distal pulses. Cap refill < 2 sec. No signs of venous insufficiency or stasis. No extremity ulcerations. No cyanosis.    Musculoskeletal:         Left Upper Extremity:       Right Upper Extremity:       Left Lower Extremity:       Right Lower Extremity:    Imaging Studies:    A/P:  Pt is a  89y Male with    found to have    PLAN:   * NPO for OR tomorrow with Dr. Mckeon  * IV fluids ordered and to start once NPO  * Pre-operative ABX ordered  * Routine daily anticoagulation held for OR  * Medical clearance requested for procedure  * Bed rest Pt Name: PRASHANT MORALES    MRN: [id number]      Patient is a 89y Male presenting to the emergency department with a chief complaint of left hip pain.  Patient seen and examined with his son.  As per the son, patient lives on the second story of his house in separate apartment - he heard a "thud" upstairs and found patient on the bathroom floor.  Patient states he went to bend over in the bathroom, lost his balance and fell on the left hip.  He was unable to put weight on the leg and ambulance was called.  Pain located over the lateral aspect of the hip.  Pain worsens with movement of the leg.  Pain improves with resting the leg on the bed.  He denies numbness/tingling down his leg.  There is no radiating pain.  He denies CP, SOB, fevers, chills.  He did not hit his head during the fall.  Patient takes ASA 81mg qd and last dose was taken 4/26/19 in the morning.        REVIEW OF SYSTEMS    General: Alert, responsive, in NAD    Skin/Breast: No rashes, no pruritis   	  Ophthalmologic: No visual changes. No redness.   	  ENMT:	No discharge. No swelling.    Respiratory and Thorax: No difficulty breathing. No cough.  	   Cardiovascular:	No chest pain. No palpitations.    Gastrointestinal:	 No abdominal pain. No diarrhea.     Genitourinary: No dysuria. No bleeding.    Musculoskeletal: SEE HPI.    Neurological: No sensory or motor changes.     Psychiatric: No anxiety or depression.    Hematology/Lymphatics: No swelling.    Endocrine: No Hx of diabetes.    ROS is otherwise negative.    PAST MEDICAL & SURGICAL HISTORY:  PAST MEDICAL & SURGICAL HISTORY:  Macular degeneration  UTI (urinary tract infection): 2013  Narcolepsy  Aortic stenosis  GI bleed  HLD (hyperlipidemia)  Depression  Anemia  Endocarditis: 2013  BPH (benign prostatic hyperplasia)  Fatigue  S/P TAVR (transcatheter aortic valve replacement)  S/P cataract extraction and insertion of intraocular lens: bilat      Allergies: No Known Allergies      Medications: ceFAZolin   IVPB 2000 milliGRAM(s) IV Intermittent once  lactated ringers. 1000 milliLiter(s) IV Continuous <Continuous>      FAMILY HISTORY:  No pertinent family history in first degree relatives  : non-contributory    Social History:     Ambulation: With Cane [x]           Vital Signs Last 24 Hrs  T(C): 36.8 (26 Apr 2019 22:45), Max: 36.8 (26 Apr 2019 22:45)  T(F): 98.3 (26 Apr 2019 22:45), Max: 98.3 (26 Apr 2019 22:45)  HR: 63 (26 Apr 2019 22:45) (63 - 63)  BP: 161/71 (26 Apr 2019 22:45) (161/71 - 161/71)  BP(mean): --  RR: 18 (26 Apr 2019 22:45) (18 - 18)  SpO2: 95% (26 Apr 2019 22:45) (95% - 95%)    Daily Height in cm: 165.1 (26 Apr 2019 22:45)    Daily       PHYSICAL EXAM:  Appearance: Alert, responsive, in no acute distress.  Musculoskeletal:       Left Lower Extremity:            Inspection:  Left leg is shortened and externally rotated.  Mild swelling noted to the thigh.  No bruising noted.  No rash on visible skin. Skin is clean, dry and intact. No bleeding. No abrasions. No ulcerations.            Palpation:  Tenderness to palpation along the lateral aspect of the proximal thigh.  No other point tenderness noted through out the femur, knee of ankle.  Non tender to palpation anterior hip joint.            Range of motion:  ROM of the hip and knee deferred secondary to fracture.  FROM of the ankle and toes            Strength:  5/5 strength ankle DF/PF and EHL/FHL.  Strength testing of the knee and hip deferred            Neurological: Sensation is grossly intact to light touch. No focal deficits or weaknesses found.            Vascular: 2+ distal pulses. Cap refill < 2 sec. No signs of venous insufficiency or stasis. No extremity ulcerations. No cyanosis.        Imaging Studies:  X-ray of the left femur shows a comminuted, displaced proximal femur fracture    A/P:  Pt is a  89y Male with left hip pain   found to have a proximal femur fracture    PLAN:   * Admitted to Trauma  * NPO for OR tomorrow with Dr. Mckeon pending clearances  * IV fluids ordered and to start once NPO  * Pre-operative ABX ordered  * Pain control as clinically indicated  * Routine daily anticoagulation held for OR  * Clearance requested for procedure  * Bed rest

## 2019-04-27 NOTE — PROGRESS NOTE ADULT - SUBJECTIVE AND OBJECTIVE BOX
Ortho Preop Note    Patient is a 89y old  Male who presents with a chief complaint of femoral fracture s/p fall (27 Apr 2019 07:39)    Diagnosis: Left proximal femur fx  Procedure: Planned ORIF/ IM nail  Surgeon: Dr Mckeon                          10.1   8.0   )-----------( 150      ( 27 Apr 2019 07:01 )             31.8     04-27    137  |  99  |  29.0<H>  ----------------------------<  176<H>  4.7   |  28.0  |  0.79    Ca    8.6      27 Apr 2019 07:01  Phos  3.9     04-27  Mg     1.9     04-27    TPro  8.1  /  Alb  4.7  /  TBili  0.4  /  DBili  x   /  AST  27  /  ALT  15  /  AlkPhos  58  04-27    PT/INR - ( 27 Apr 2019 00:44 )   PT: 11.7 sec;   INR: 1.02 ratio                      Assessment & Plan:  89yMale with Left hip IT fx  -For OR today 4/27/19  - cardiac note reviewed and clearance appreciated

## 2019-04-27 NOTE — ED ADULT NURSE NOTE - NSFALLRSKPSTHSTOCCUR_ED_ALL_ED
Single Mechanical/Accidental Fall Transposition Flap Text: The defect edges were debeveled with a #15 scalpel blade.  Given the location of the defect and the proximity to free margins a transposition flap was deemed most appropriate.  Using a sterile surgical marker, an appropriate transposition flap was drawn incorporating the defect.    The area thus outlined was incised deep to adipose tissue with a #15 scalpel blade.  The skin margins were undermined to an appropriate distance in all directions utilizing iris scissors.

## 2019-04-27 NOTE — H&P ADULT - HISTORY OF PRESENT ILLNESS
89y Male BIB BLS s/p witnessed (by son) slip and ground level fall. denies HS or LOC. Denies dizziness or lightheadedness prior. Complaining only of L hip/upper leg pain. DEnies chest pain, sob or palpitations.    A airway intact, C collar placed, speaking full sentences  B equal breath sounds bilaterally  C radial/DP/femoral pulses intact bilaterally   D GCS15 [id number], moving all fours, no focal deficits, pupils R 3mm reactive/L 3mm reactive  E patient fully exposed, +exernally rotated shortened L leg, no bleeding, provided warm blankets      ROS:  General: denies fatigue, unintended weight loss or night sweats  Neuro: denies focal weakness, numbness or tingling  CV: denies chest pain, chest pressure or palpitations  Resp: denies shortness of breath, pleuritic pain, cough, or wheezing  GI: denies changes in bowel movement, melena or BRBPR  : denies dysuria, hematuria, urinary frequency or urinary urgency  MSK: denies myalgias    PAST MEDICAL & SURGICAL HISTORY:  Macular degeneration  UTI (urinary tract infection): 2013  Narcolepsy  Aortic stenosis  GI bleed  HLD (hyperlipidemia)  Depression  Anemia  Endocarditis: 2013  BPH (benign prostatic hyperplasia)  Fatigue  S/P TAVR (transcatheter aortic valve replacement)  S/P cataract extraction and insertion of intraocular lens: bilat    FAMILY HISTORY:  No pertinent family history in first degree relatives    SOCIAL HISTORY:  denies toxic habits, lives with son at home    ALLERGIES: No Known Allergies      HOME MEDICATIONS: reviewed, only blood thinner [id number]    --------------------------------------------------------------------------------------------    PHYSICAL EXAM:   General: NAD, Lying in bed comfortably  Neuro: A+Ox3  HEENT: NC/AT, EOMI  Neck: Soft, supple  Cardio: RRR, nml S1/S2  Resp: Good effort, CTA b/l  Thorax: No chest wall tenderness  GI/Abd: Soft, NT/ND, no rebound/guarding, no masses palpated  Vascular: All 4 extremities warm. palpable DP b/l, motor and sensation intact b/l feet  Skin: Intact, no breakdown  Lymphatic/Nodes: No palpable lymphadenopathy  Pelvis: stable  Musculoskeletal: All 4 extremities moving spontaneously, +limited L hip flexion, no spinal tenderness or stepoffs. +exernally rotated shortened L leg  --------------------------------------------------------------------------------------------    LABS                 13.0   9.3    )----------(  186       ( 27 Apr 2019 00:44 )               40.3      135    |  96     |  30.0   ----------------------------<  133        ( 27 Apr 2019 00:44 )  4.8     |  26.0   |  0.85     Ca    9.2        ( 27 Apr 2019 00:44 )  Mg     2.1       ( 27 Apr 2019 00:44 )    TPro  8.1    /  Alb  4.7    /  TBili  0.4    /  DBili  x      /  AST  27     /  ALT  15     /  AlkPhos  58     ( 27 Apr 2019 00:44 )    LIVER FUNCTIONS - ( 27 Apr 2019 00:44 )  Alb: 4.7 g/dL / Pro: 8.1 g/dL / ALK PHOS: 58 U/L / ALT: 15 U/L / AST: 27 U/L / GGT: x           PT/INR -  11.7 sec / 1.02 ratio   ( 27 Apr 2019 00:44 )    CARDIAC MARKERS ( 27 Apr 2019 00:44 )  x     / <0.01 ng/mL / 163 U/L / x     / 6.7 ng/mL      --------------------------------------------------------------------------------------------  IMAGING  CXR: no hemo ptx or acute fx  PXR: no obvious fx in pelvis  L hip fx: comminuted L prox femo shaft fx    ASSESSMENT: Patient is a 89y old male s/p witnessed mechanical fall with L proximal femoral shaft fracture    PLAN:    - admit to trauma floor monitored bed with   - OR tomorrow with ortho  - EKG, echo, cards consult  - CT A/P with IV contrast  - NPO  - IVF  - pain control  - DVT ppx  - strict bedrest  - strict I/Os  - Plan discussed with Attending, Dr. Frances

## 2019-04-27 NOTE — ED ADULT NURSE NOTE - OBJECTIVE STATEMENT
pt presents to ER for slip out of bed, son at bedside was witness.  denies LOC denies n/v or headache. left leg shortened and externally rotated. ctm

## 2019-04-27 NOTE — CONSULT NOTE ADULT - ATTENDING COMMENTS
Ortho Trauma Attending:  Agree with above PA note.  Note edited where necessary.  Orthopedic Surgery is ready to proceed with surgery pending medical optimization and adequate Operating Room availability. Risks of surgical delay discussed with other providers and staff. Please call with any questions or concerns.     Brady Mckeon MD  Orthopaedic Trauma Surgery

## 2019-04-27 NOTE — PROGRESS NOTE ADULT - SUBJECTIVE AND OBJECTIVE BOX
Ortho Post Op Check    Name: PRASHANT MORALES    MR #: [id number]    Procedure: Left Hip IM Nail  Surgeon: Linda BARNETT    Pt comfortable without complaints, pain controlled  Denies CP, SOB, N/V, numbness/tingling, denies calf pain or paresthesias.     General Exam:  Vital Signs Last 24 Hrs  T(C): 36.5 (04-27-19 @ 20:03), Max: 36.5 (04-27-19 @ 20:03)  T(F): 97.7 (04-27-19 @ 20:03), Max: 97.7 (04-27-19 @ 20:03)  HR: 71 (04-27-19 @ 20:03) (71 - 77)  BP: 153/57 (04-27-19 @ 20:03) (129/56 - 153/57)  BP(mean): --  RR: 18 (04-27-19 @ 20:03) (18 - 18)  SpO2: 94% (04-27-19 @ 20:03) (94% - 96%)    General: Pt Alert and oriented, NAD, controlled pain.  Dressings C/D/I. No bleeding.  Pulses: 2+ dorsalis pedis pulse. Cap refill < 2 sec.  Sensation: Grossly intact to light touch without deficit.  Motor: + FHL/GS AT 4+/5 EHL 4/5 on the LLE  RLE: AT/GS/EHL/FHL 5/5 Sensation intact to b/l LE  Left knee: no swelling effusion noted. no swelling lateral prox leg.                           8.5    8.8   )-----------( 161      ( 27 Apr 2019 20:17 )             26.6   27 Apr 2019 20:17    137    |  100    |  29.0   ----------------------------<  152    4.4     |  25.0   |  0.93     Ca    8.2        27 Apr 2019 20:17  Phos  3.9       27 Apr 2019 07:01  Mg     1.9       27 Apr 2019 07:01    TPro  8.1    /  Alb  4.7    /  TBili  0.4    /  DBili  x      /  AST  27     /  ALT  15     /  AlkPhos  58     27 Apr 2019 00:44      Post-op X-Ray: ap/lat show prox femur fracture well approximated with hardware intact.     A/P: 89yMale POD#0 s/p Left hip IM Nail  - Stable  - Pain Control  - DVT ppx: Lovenox 30 BID  - Post op abx: Ancef  - PT eval pending  - Weight bearing status: WBAT LLE  Patient states the weakness to the Left foot is new, unknown if since his fall or since surgery.  Will follow  Patient D/W Dr Mckeon

## 2019-04-27 NOTE — CONSULT NOTE ADULT - SUBJECTIVE AND OBJECTIVE BOX
[place] [facility], THE [facility]                                   [address] [place] [address]                                                      PHONE: [phone]                                                         FAX: [phone]  [email or web address]  ---------------------------------------------------------------------------------------------------------------------------------    Reason for Consult: pre-op cardiac eval; femoral fracture s/p fall    HPI:  PRASHANT MORALES is an 89y Male h/o AS s/p TAVR 10/2016, no CAD, preserved EF, last saw cardiologist in our office in Nov who presents s/p mechanical fall witnessed by son and sustaining L prox femoral shaft fracture now pre-op for ortho surgery to repair. No CP. No SOB/RAMIREZ. Son present at bedside.    PAST MEDICAL & SURGICAL HISTORY:  Macular degeneration  UTI (urinary tract infection): 2013  Narcolepsy  Aortic stenosis  GI bleed  HLD (hyperlipidemia)  Depression  Anemia  Endocarditis: 2013  BPH (benign prostatic hyperplasia)  Fatigue  S/P TAVR (transcatheter aortic valve replacement)  S/P cataract extraction and insertion of intraocular lens: bilat      No Known Allergies      MEDICATIONS  (STANDING):  ceFAZolin   IVPB 2000 milliGRAM(s) IV Intermittent once  escitalopram 20 milliGRAM(s) Oral daily  finasteride 5 milliGRAM(s) Oral daily  lactated ringers. 1000 milliLiter(s) (85 mL/Hr) IV Continuous <Continuous>  melatonin 5 milliGRAM(s) Oral at bedtime  memantine 10 milliGRAM(s) Oral two times a day  metoprolol tartrate 25 milliGRAM(s) Oral two times a day  modafinil 200 milliGRAM(s) Oral daily  multivitamin 1 Tablet(s) Oral daily  tamsulosin 0.4 milliGRAM(s) Oral at bedtime    MEDICATIONS  (PRN):  acetaminophen   Tablet .. 650 milliGRAM(s) Oral every 6 hours PRN Mild Pain (1 - 3)      Social History:  Cigarettes:        no            Alchohol:      no           Illicit Drug Abuse:  no    ROS: Negative other than as mentioned in HPI.    Vital Signs Last 24 Hrs  T(C): 36.7 (27 Apr 2019 07:20), Max: 37 (27 Apr 2019 03:57)  T(F): 98 (27 Apr 2019 07:20), Max: 98.6 (27 Apr 2019 03:57)  HR: 72 (27 Apr 2019 07:20) (63 - 74)  BP: 133/57 (27 Apr 2019 07:20) (132/69 - 161/71)  BP(mean): --  RR: 18 (27 Apr 2019 07:20) (18 - 18)  SpO2: 98% (27 Apr 2019 07:20) (95% - 100%)  ICU Vital Signs Last 24 Hrs  PRASHANT MORALES  I&O's Detail    I&O's Summary    Drug Dosing Weight  PRASHANT MORALES      PHYSICAL EXAM:  General: NAD.  HEENT: Head; normal.  Eyes: Pupils reactive.  Neck: Supple.  CARDIOVASCULAR: Normal S1 and S2.   LUNGS: Normal breath sounds bilaterally.  ABDOMEN: Soft, nontender without mass or organomegaly.  EXTREMITIES: No clubbing, cyanosis or edema.   SKIN: warm.  NEURO: Alert/awake.    PSYCH: normal affect.        LABS:                        13.0   9.3   )-----------( 186      ( 27 Apr 2019 00:44 )             40.3     04-27    135  |  96<L>  |  30.0<H>  ----------------------------<  133<H>  4.8   |  26.0  |  0.85    Ca    9.2      27 Apr 2019 00:44  Mg     2.1     04-27    TPro  8.1  /  Alb  4.7  /  TBili  0.4  /  DBili  x   /  AST  27  /  ALT  15  /  AlkPhos  58  04-27    PRASHANT MORALES  CARDIAC MARKERS ( 27 Apr 2019 00:44 )  x     / <0.01 ng/mL / 163 U/L / x     / 6.7 ng/mL      PT/INR - ( 27 Apr 2019 00:44 )   PT: 11.7 sec;   INR: 1.02 ratio             RADIOLOGY & ADDITIONAL STUDIES:    INTERPRETATION OF TELEMETRY (personally reviewed): NSR 70's, no arrhythmias.    ECG: NSR 63, first degree AVB (chronic)      Assessment and Plan:  89y Male h/o AS s/p TAVR 10/2016, no CAD, preserved EF, last saw cardiologist in our office in Nov who presents s/p mechanical fall witnessed by son and sustaining L prox femoral shaft fracture now pre-op for ortho surgery to repair. No CP. No SOB/RAMIREZ. Son present at bedside.    1. Echo from 11/2018 showed normal TAVR, no sig MR, EF 60%.  2. Stable from cardiac standpoint.  3. No absolute cardiac contraindications to proceeding with orthopedic surgery.  4. Continue medical management.  5. Please call with any questions or changes. [place] [facility], [facility]                                   [address] [place] [address]                                                      PHONE: [phone]                                                         FAX: [phone]  [email or web address]  ---------------------------------------------------------------------------------------------------------------------------------    Reason for Consult: pre-op cardiac eval; femoral fracture s/p fall    HPI:  PRASHANT MORALES is an 89y Male h/o AS s/p TAVR 10/2016, no CAD, preserved EF, last saw cardiologist in our office in Nov who presents s/p mechanical fall witnessed by son and sustaining L prox femoral shaft fracture now pre-op for ortho surgery to repair. No CP. No SOB/RAMIREZ. Son present at bedside.    PAST MEDICAL & SURGICAL HISTORY:  Macular degeneration  UTI (urinary tract infection): 2013  Narcolepsy  Aortic stenosis  GI bleed  HLD (hyperlipidemia)  Depression  Anemia  Endocarditis: 2013  BPH (benign prostatic hyperplasia)  Fatigue  S/P TAVR (transcatheter aortic valve replacement)  S/P cataract extraction and insertion of intraocular lens: bilat      No Known Allergies      MEDICATIONS  (STANDING):  ceFAZolin   IVPB 2000 milliGRAM(s) IV Intermittent once  escitalopram 20 milliGRAM(s) Oral daily  finasteride 5 milliGRAM(s) Oral daily  lactated ringers. 1000 milliLiter(s) (85 mL/Hr) IV Continuous <Continuous>  melatonin 5 milliGRAM(s) Oral at bedtime  memantine 10 milliGRAM(s) Oral two times a day  metoprolol tartrate 25 milliGRAM(s) Oral two times a day  modafinil 200 milliGRAM(s) Oral daily  multivitamin 1 Tablet(s) Oral daily  tamsulosin 0.4 milliGRAM(s) Oral at bedtime    MEDICATIONS  (PRN):  acetaminophen   Tablet .. 650 milliGRAM(s) Oral every 6 hours PRN Mild Pain (1 - 3)      Social History:  Cigarettes:        no            Alchohol:      no           Illicit Drug Abuse:  no    ROS: Negative other than as mentioned in HPI.    Vital Signs Last 24 Hrs  T(C): 36.7 (27 Apr 2019 07:20), Max: 37 (27 Apr 2019 03:57)  T(F): 98 (27 Apr 2019 07:20), Max: 98.6 (27 Apr 2019 03:57)  HR: 72 (27 Apr 2019 07:20) (63 - 74)  BP: 133/57 (27 Apr 2019 07:20) (132/69 - 161/71)  BP(mean): --  RR: 18 (27 Apr 2019 07:20) (18 - 18)  SpO2: 98% (27 Apr 2019 07:20) (95% - 100%)  ICU Vital Signs Last 24 Hrs  PRASHANT MORALES  I&O's Detail    I&O's Summary    Drug Dosing Weight  PRASHANT MORALES      PHYSICAL EXAM:  General: NAD.  HEENT: Head; normal.  Eyes: Pupils reactive.  Neck: Supple.  CARDIOVASCULAR: Normal S1 and S2.   LUNGS: Normal breath sounds bilaterally.  ABDOMEN: Soft, nontender without mass or organomegaly.  EXTREMITIES: No clubbing, cyanosis or edema.   SKIN: warm.  NEURO: Alert/awake.    PSYCH: normal affect.        LABS:                        13.0   9.3   )-----------( 186      ( 27 Apr 2019 00:44 )             40.3     04-27    135  |  96<L>  |  30.0<H>  ----------------------------<  133<H>  4.8   |  26.0  |  0.85    Ca    9.2      27 Apr 2019 00:44  Mg     2.1     04-27    TPro  8.1  /  Alb  4.7  /  TBili  0.4  /  DBili  x   /  AST  27  /  ALT  15  /  AlkPhos  58  04-27    PRASHANT MORALES  CARDIAC MARKERS ( 27 Apr 2019 00:44 )  x     / <0.01 ng/mL / 163 U/L / x     / 6.7 ng/mL      PT/INR - ( 27 Apr 2019 00:44 )   PT: 11.7 sec;   INR: 1.02 ratio             RADIOLOGY & ADDITIONAL STUDIES:    INTERPRETATION OF TELEMETRY (personally reviewed): NSR 70's, no arrhythmias.    ECG: NSR 63, first degree AVB (chronic)      Assessment and Plan:  89y Male h/o AS s/p TAVR 10/2016, no CAD, preserved EF, last saw cardiologist in our office in Nov who presents s/p mechanical fall witnessed by son and sustaining L prox femoral shaft fracture now pre-op for ortho surgery to repair. No CP. No SOB/RAMIREZ. Son present at bedside.    1. Echo from 11/2018 showed normal TAVR, no sig MR, EF 60%.  2. Stable from cardiac standpoint.  3. No absolute cardiac contraindications to proceeding with orthopedic surgery.  4. Continue medical management. Continue Bblocker esvin-operatively.  5. Please call with any questions or changes.

## 2019-04-27 NOTE — H&P ADULT - ATTENDING COMMENTS
ground level fall no loc  primary intact  secondary left thigh ttp, +swelling but soft good distal pulses  labs reviewed  imaging reviewed  plan  admit to ACS/trauma  consult ortho for left femur fx  will need cardiology evaluation, echo, ekg , trop.

## 2019-04-27 NOTE — CONSULT NOTE ADULT - SUBJECTIVE AND OBJECTIVE BOX
Pt Name: PRASHANT MORALES    MRN: [id number]      Patient is a 89y Male presenting to the emergency department with a chief complaint of Patient is a 89y old  Male who presents with a chief complaint of  Right hip dislocation this afternoon.  Patient states he had lunch after one beer and went to bed but forgot to put on his abduction pillow.  He states he woke up and the hip was dislocated.  Patient unable to ambulate post dislocation.  Patient denies any fall/injury.  Patient with hx of Right total hip arthroplasty posterior approach by Dr Varela at [facility] 2017.  Patient was seen a mos ago after a fall and dislocated hip. He was apparently taken to OR and closed reduced by Dr Rankin.  Patient has not followed up with Dr Varela recently.  He also has hx of Right ankle fusion.  He admits to numbness to the Right foot which is not new and admits to chronic loss of ankle ROM    HEALTH ISSUES - PROBLEM Dx:      .      REVIEW OF SYSTEMS      General:	    Skin/Breast:  	  Ophthalmologic:  	  ENMT:	    Respiratory and Thorax:  	  Cardiovascular:	    Gastrointestinal:	    Genitourinary:	    Musculoskeletal:	 Right hip/groin pain    Neurological:	    Psychiatric:	    Hematology/Lymphatics:	    Endocrine:	    Allergic/Immunologic:	    ROS is otherwise negative.    PAST MEDICAL & SURGICAL HISTORY:  PAST MEDICAL & SURGICAL HISTORY:  Macular degeneration  UTI (urinary tract infection): 2013  Narcolepsy  Aortic stenosis  GI bleed  HLD (hyperlipidemia)  Depression  Anemia  Endocarditis: 2013  BPH (benign prostatic hyperplasia)  Fatigue  S/P TAVR (transcatheter aortic valve replacement)  S/P cataract extraction and insertion of intraocular lens: bilat      Allergies: No Known Allergies      Medications: acetaminophen   Tablet .. 650 milliGRAM(s) Oral every 6 hours PRN  aluminum hydroxide/magnesium hydroxide/simethicone Suspension 30 milliLiter(s) Oral four times a day PRN  ceFAZolin   IVPB 2000 milliGRAM(s) IV Intermittent once  ceFAZolin   IVPB 2000 milliGRAM(s) IV Intermittent every 8 hours  diphenhydrAMINE 25 milliGRAM(s) Oral every 4 hours PRN  docusate sodium 100 milliGRAM(s) Oral three times a day  escitalopram 20 milliGRAM(s) Oral daily  famotidine    Tablet 20 milliGRAM(s) Oral every 12 hours  ferrous    sulfate 325 milliGRAM(s) Oral three times a day with meals  finasteride 5 milliGRAM(s) Oral daily  folic acid 1 milliGRAM(s) Oral daily  HYDROmorphone  Injectable 0.5 milliGRAM(s) IV Push every 3 hours PRN  lactated ringers. 1000 milliLiter(s) IV Continuous <Continuous>  magnesium hydroxide Suspension 30 milliLiter(s) Oral daily PRN  melatonin 5 milliGRAM(s) Oral at bedtime  memantine 10 milliGRAM(s) Oral two times a day  metoprolol tartrate 25 milliGRAM(s) Oral two times a day  modafinil 200 milliGRAM(s) Oral daily  ondansetron Injectable 4 milliGRAM(s) IV Push every 6 hours PRN  oxyCODONE    IR 10 milliGRAM(s) Oral every 4 hours PRN  oxyCODONE    IR 5 milliGRAM(s) Oral every 4 hours PRN  tamsulosin 0.4 milliGRAM(s) Oral at bedtime      FAMILY HISTORY:  No pertinent family history in first degree relatives  : non-contributory    Social History:     Ambulation: Walking independently [ ] With Cane [ ] With Walker [ ]  Bedbound [ ]                           8.5    8.8   )-----------( 161      ( 27 Apr 2019 20:17 )             26.6     04-27    137  |  100  |  29.0<H>  ----------------------------<  152<H>  4.4   |  25.0  |  0.93    Ca    8.2<L>      27 Apr 2019 20:17  Phos  3.9     04-27  Mg     1.9     04-27    TPro  8.1  /  Alb  4.7  /  TBili  0.4  /  DBili  x   /  AST  27  /  ALT  15  /  AlkPhos  58  04-27      PHYSICAL EXAM:    Vital Signs Last 24 Hrs  T(C): 36.5 (27 Apr 2019 20:03), Max: 38.1 (27 Apr 2019 13:05)  T(F): 97.7 (27 Apr 2019 20:03), Max: 100.6 (27 Apr 2019 13:05)  HR: 71 (27 Apr 2019 20:03) (63 - 104)  BP: 153/57 (27 Apr 2019 20:03) (108/44 - 166/96)  BP(mean): --  RR: 18 (27 Apr 2019 20:03) (11 - 22)  SpO2: 94% (27 Apr 2019 20:03) (94% - 100%)  Daily Height in cm: 165.1 (26 Apr 2019 22:45)    Daily     Appearance: Alert, responsive, in no acute distress.    Neurological: Sensation is grossly intact to light touch. 5/5 motor function of all extremities. No focal deficits or weaknesses found.    Skin: no rash on visible skin. Skin is clean, dry and intact. No bleeding. No abrasions. No ulcerations.    Vascular: 2+ distal pulses. Cap refill < 2 sec. No signs of venous insuffiency or stasis. No extremity ulcerations. No cyanosis.    Musculoskeletal:         Left Upper Extremity:       Right Upper Extremity:       Left Lower Extremity:       Right Lower Extremity: Limb is shortened in neutral position.  Scar noted posterolateral without any swelling. no warmth erythema noted.  thigh/calf soft NT Ankle ROM limited 5-15 degrees. diminished sensation to the Right foot baseline.  AT/GS intact. DP / PT pulse 2+    Imaging Studies: Ap/lat show Right total hip arthroplasty prosthesis with superior dislocation. no frx dislocations noted.     A/P:  Pt is a  89y Male with Patient is a 89y old  Male who presents with a chief complaint of femoral fracture s/p fall (27 Apr 2019 07:39)   found to have Right total hip arthroplasty dislocation    PLAN:   Patient evaluated with Dr Mckeon.  Discussed options with patient.  Patient opted for closed reduction. As per ER ATTG Michael however due to patients low RR and O2 saturation will need to wait until improved to attempt reduction  Dr Rodriguez will call Ortho when ready for conscious sedation and closed reduction attempt  Dr Mckeon aware and agrees with plan.

## 2019-04-27 NOTE — BRIEF OPERATIVE NOTE - NSICDXBRIEFPREOP_GEN_ALL_CORE_FT
PRE-OP DIAGNOSIS:  Displaced comminuted fracture of shaft of left femur 27-Apr-2019 12:52:03  Brady Mckeon

## 2019-04-27 NOTE — ED ADULT NURSE NOTE - NSIMPLEMENTINTERV_GEN_ALL_ED
Implemented All Fall with Harm Risk Interventions:  [place] to call system. Call bell, personal items and telephone within reach. Instruct patient to call for assistance. Room bathroom lighting operational. Non-slip footwear when patient is off stretcher. Physically safe environment: no spills, clutter or unnecessary equipment. Stretcher in lowest position, wheels locked, appropriate side rails in place. Provide visual cue, wrist band, yellow gown, etc. Monitor gait and stability. Monitor for mental status changes and reorient to person, place, and time. Review medications for side effects contributing to fall risk. Reinforce activity limits and safety measures with patient and family. Provide visual clues: red socks.
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
## 2019-04-28 DIAGNOSIS — S72.92XA UNSPECIFIED FRACTURE OF LEFT FEMUR, INITIAL ENCOUNTER FOR CLOSED FRACTURE: ICD-10-CM

## 2019-04-28 LAB
ANION GAP SERPL CALC-SCNC: 10 MMOL/L — SIGNIFICANT CHANGE UP (ref 5–17)
BASOPHILS # BLD AUTO: 0 K/UL — SIGNIFICANT CHANGE UP (ref 0–0.2)
BASOPHILS NFR BLD AUTO: 0.1 % — SIGNIFICANT CHANGE UP (ref 0–2)
BUN SERPL-MCNC: 25 MG/DL — HIGH (ref 8–20)
CALCIUM SERPL-MCNC: 8 MG/DL — LOW (ref 8.6–10.2)
CHLORIDE SERPL-SCNC: 101 MMOL/L — SIGNIFICANT CHANGE UP (ref 98–107)
CO2 SERPL-SCNC: 23 MMOL/L — SIGNIFICANT CHANGE UP (ref 22–29)
CREAT SERPL-MCNC: 0.77 MG/DL — SIGNIFICANT CHANGE UP (ref 0.5–1.3)
EOSINOPHIL # BLD AUTO: 0 K/UL — SIGNIFICANT CHANGE UP (ref 0–0.5)
EOSINOPHIL NFR BLD AUTO: 0.1 % — SIGNIFICANT CHANGE UP (ref 0–5)
GLUCOSE SERPL-MCNC: 184 MG/DL — HIGH (ref 70–115)
HCT VFR BLD CALC: 22.7 % — LOW (ref 42–52)
HGB BLD-MCNC: 7.4 G/DL — LOW (ref 14–18)
LYMPHOCYTES # BLD AUTO: 0.8 K/UL — LOW (ref 1–4.8)
LYMPHOCYTES # BLD AUTO: 10.7 % — LOW (ref 20–55)
MAGNESIUM SERPL-MCNC: 2 MG/DL — SIGNIFICANT CHANGE UP (ref 1.6–2.6)
MCHC RBC-ENTMCNC: 28.9 PG — SIGNIFICANT CHANGE UP (ref 27–31)
MCHC RBC-ENTMCNC: 32.6 G/DL — SIGNIFICANT CHANGE UP (ref 32–36)
MCV RBC AUTO: 88.7 FL — SIGNIFICANT CHANGE UP (ref 80–94)
MONOCYTES # BLD AUTO: 0.7 K/UL — SIGNIFICANT CHANGE UP (ref 0–0.8)
MONOCYTES NFR BLD AUTO: 10.3 % — HIGH (ref 3–10)
NEUTROPHILS # BLD AUTO: 5.6 K/UL — SIGNIFICANT CHANGE UP (ref 1.8–8)
NEUTROPHILS NFR BLD AUTO: 78.7 % — HIGH (ref 37–73)
PHOSPHATE SERPL-MCNC: 3.5 MG/DL — SIGNIFICANT CHANGE UP (ref 2.4–4.7)
PLATELET # BLD AUTO: 116 K/UL — LOW (ref 150–400)
POTASSIUM SERPL-MCNC: 4.1 MMOL/L — SIGNIFICANT CHANGE UP (ref 3.5–5.3)
POTASSIUM SERPL-SCNC: 4.1 MMOL/L — SIGNIFICANT CHANGE UP (ref 3.5–5.3)
RBC # BLD: 2.56 M/UL — LOW (ref 4.6–6.2)
RBC # FLD: 14.5 % — SIGNIFICANT CHANGE UP (ref 11–15.6)
SODIUM SERPL-SCNC: 134 MMOL/L — LOW (ref 135–145)
WBC # BLD: 7.1 K/UL — SIGNIFICANT CHANGE UP (ref 4.8–10.8)
WBC # FLD AUTO: 7.1 K/UL — SIGNIFICANT CHANGE UP (ref 4.8–10.8)

## 2019-04-28 PROCEDURE — 99232 SBSQ HOSP IP/OBS MODERATE 35: CPT

## 2019-04-28 RX ORDER — MULTIVIT WITH MIN/MFOLATE/K2 340-15/3 G
1 POWDER (GRAM) ORAL DAILY
Qty: 0 | Refills: 0 | Status: DISCONTINUED | OUTPATIENT
Start: 2019-04-28 | End: 2019-05-01

## 2019-04-28 RX ORDER — POLYETHYLENE GLYCOL 3350 17 G/17G
17 POWDER, FOR SOLUTION ORAL
Qty: 0 | Refills: 0 | Status: DISCONTINUED | OUTPATIENT
Start: 2019-04-28 | End: 2019-05-01

## 2019-04-28 RX ADMIN — Medication 100 MILLIGRAM(S): at 05:20

## 2019-04-28 RX ADMIN — Medication 1 BOTTLE: at 11:06

## 2019-04-28 RX ADMIN — Medication 100 MILLIGRAM(S): at 11:05

## 2019-04-28 RX ADMIN — Medication 325 MILLIGRAM(S): at 11:05

## 2019-04-28 RX ADMIN — Medication 325 MILLIGRAM(S): at 07:35

## 2019-04-28 RX ADMIN — Medication 25 MILLIGRAM(S): at 05:20

## 2019-04-28 RX ADMIN — TAMSULOSIN HYDROCHLORIDE 0.4 MILLIGRAM(S): 0.4 CAPSULE ORAL at 21:41

## 2019-04-28 RX ADMIN — POLYETHYLENE GLYCOL 3350 17 GRAM(S): 17 POWDER, FOR SOLUTION ORAL at 17:02

## 2019-04-28 RX ADMIN — Medication 100 MILLIGRAM(S): at 21:41

## 2019-04-28 RX ADMIN — POLYETHYLENE GLYCOL 3350 17 GRAM(S): 17 POWDER, FOR SOLUTION ORAL at 05:20

## 2019-04-28 RX ADMIN — MODAFINIL 200 MILLIGRAM(S): 200 TABLET ORAL at 11:05

## 2019-04-28 RX ADMIN — Medication 100 MILLIGRAM(S): at 03:24

## 2019-04-28 RX ADMIN — ONDANSETRON 4 MILLIGRAM(S): 8 TABLET, FILM COATED ORAL at 12:33

## 2019-04-28 RX ADMIN — Medication 25 MILLIGRAM(S): at 17:01

## 2019-04-28 RX ADMIN — Medication 5 MILLIGRAM(S): at 21:41

## 2019-04-28 RX ADMIN — FAMOTIDINE 20 MILLIGRAM(S): 10 INJECTION INTRAVENOUS at 17:02

## 2019-04-28 RX ADMIN — FAMOTIDINE 20 MILLIGRAM(S): 10 INJECTION INTRAVENOUS at 05:20

## 2019-04-28 RX ADMIN — Medication 1 MILLIGRAM(S): at 11:05

## 2019-04-28 RX ADMIN — MEMANTINE HYDROCHLORIDE 10 MILLIGRAM(S): 10 TABLET ORAL at 05:20

## 2019-04-28 RX ADMIN — ESCITALOPRAM OXALATE 20 MILLIGRAM(S): 10 TABLET, FILM COATED ORAL at 11:05

## 2019-04-28 RX ADMIN — FINASTERIDE 5 MILLIGRAM(S): 5 TABLET, FILM COATED ORAL at 11:05

## 2019-04-28 RX ADMIN — MEMANTINE HYDROCHLORIDE 10 MILLIGRAM(S): 10 TABLET ORAL at 17:01

## 2019-04-28 RX ADMIN — Medication 325 MILLIGRAM(S): at 17:02

## 2019-04-28 NOTE — PROGRESS NOTE ADULT - PROBLEM SELECTOR PLAN 1
1. Physical therapy to see today (WBAT LLE)   2. Aggressive bowel regimen   3. f/u urology for prostate finding 4/27 CT scan   4. Pain control and routine DVT PPx

## 2019-04-28 NOTE — PHYSICAL THERAPY INITIAL EVALUATION ADULT - PERTINENT HX OF CURRENT PROBLEM, REHAB EVAL
89y Male BIB BLS s/p witnessed (by son) slip and ground level fall. denies HS or LOC. Denies dizziness or lightheadedness prior. Complaining only of L hip/upper leg pain. DEnies chest pain, sob or palpitations., now s/p IMN left femur

## 2019-04-28 NOTE — PHYSICAL THERAPY INITIAL EVALUATION ADULT - ADDITIONAL COMMENTS
Pt's son reports independent PTA, no device indoors, uses [employer]  for long distance in community. Son assists as needed

## 2019-04-28 NOTE — PHYSICAL THERAPY INITIAL EVALUATION ADULT - LIVES WITH, PROFILE
children/Pt lives with son in two family home, no steps to enter, pt does not negotiate stairs to upstairs living space

## 2019-04-28 NOTE — PHYSICAL THERAPY INITIAL EVALUATION ADULT - PLANNED THERAPY INTERVENTIONS, PT EVAL
neuromuscular re-education/bed mobility training/balance training/transfer training/gait training/motor coordination training/ROM/strengthening

## 2019-04-28 NOTE — PHYSICAL THERAPY INITIAL EVALUATION ADULT - CRITERIA FOR SKILLED THERAPEUTIC INTERVENTIONS
impairments found/therapy frequency/anticipated discharge recommendation/risk reduction/prevention/rehab potential/functional limitations in following categories/predicted duration of therapy intervention/anticipated equipment needs at discharge

## 2019-04-28 NOTE — PROGRESS NOTE ADULT - SUBJECTIVE AND OBJECTIVE BOX
HPI/OVERNIGHT EVENTS: Patient seen and examined at bedside this AM. No acute events overnight per nursing report. Underwent IMN of L femur for his L proximal femoral shaft fx. Required manual disimpaction and fleet enema yesterday. Denies fever, chills, nausea, vomiting chest pain, SOB, dizziness, abd pain or any other concerning symptoms    Vital Signs Last 24 Hrs  T(C): 36.8 (2019 04:42), Max: 38.1 (2019 13:05)  T(F): 98.2 (2019 04:42), Max: 100.6 (2019 13:05)  HR: 90 (2019 04:42) (70 - 104)  BP: 117/55 (2019 04:42) (108/44 - 166/96)  BP(mean): --  RR: 18 (2019 04:42) (11 - 22)  SpO2: 95% (2019 05:16) (90% - 100%)    I&O's Detail    2019 07:01  -  2019 07:00  --------------------------------------------------------  IN:    lactated ringers.: 425 mL  Total IN: 425 mL    OUT:  Total OUT: 0 mL    Total NET: 425 mL    Constitutional: Frail elderly patient resting comfortably in bed, in no acute distress  HEENT: EOMI / PERRL b/l, scleral icterus  Neck: No JVD, full ROM without pain  Respiratory: CTAB respirations are unlabored, no accessory muscle use, no conversational dyspnea  Cardiovascular: Normal S1 and S2   Gastrointestinal: Abdomen soft, non-tender, non-distended, no rebound tenderness / guarding   Neurological: GCS: 15 (4/5/6). A&O x 3; no gross sensory / motor / coordination deficits  Psychiatric: Normal mood, normal affect  Musculoskeletal: No joint pain, swelling or deformity; no limitation of movement    LABS:                        7.4    7.1   )-----------( 116      ( 2019 08:07 )             22.7     04-28    134<L>  |  101  |  25.0<H>  ----------------------------<  184<H>  4.1   |  23.0  |  0.77    Ca    8.0<L>      2019 08:07  Phos  3.5       Mg     2.0         TPro  8.1  /  Alb  4.7  /  TBili  0.4  /  DBili  x   /  AST  27  /  ALT  15  /  AlkPhos  58      PT/INR - ( 2019 00:44 )   PT: 11.7 sec;   INR: 1.02 ratio           Urinalysis Basic - ( 2019 21:42 )    Color: Yellow / Appearance: Clear / S.020 / pH: x  Gluc: x / Ketone: Trace  / Bili: Negative / Urobili: Negative mg/dL   Blood: x / Protein: 30 mg/dL / Nitrite: Negative   Leuk Esterase: Negative / RBC: 0-2 /HPF / WBC 0-2   Sq Epi: x / Non Sq Epi: Occasional / Bacteria: Occasional    MEDICATIONS  (STANDING):  ceFAZolin   IVPB 2000 milliGRAM(s) IV Intermittent once  docusate sodium 100 milliGRAM(s) Oral three times a day  escitalopram 20 milliGRAM(s) Oral daily  famotidine    Tablet 20 milliGRAM(s) Oral every 12 hours  ferrous    sulfate 325 milliGRAM(s) Oral three times a day with meals  finasteride 5 milliGRAM(s) Oral daily  folic acid 1 milliGRAM(s) Oral daily  magnesium citrate Oral Solution 1 Bottle Oral daily  melatonin 5 milliGRAM(s) Oral at bedtime  memantine 10 milliGRAM(s) Oral two times a day  metoprolol tartrate 25 milliGRAM(s) Oral two times a day  modafinil 200 milliGRAM(s) Oral daily  polyethylene glycol 3350 17 Gram(s) Oral two times a day  tamsulosin 0.4 milliGRAM(s) Oral at bedtime    MEDICATIONS  (PRN):  acetaminophen   Tablet .. 650 milliGRAM(s) Oral every 6 hours PRN Temp greater or equal to 38C (100.4F), Mild Pain (1 - 3)  aluminum hydroxide/magnesium hydroxide/simethicone Suspension 30 milliLiter(s) Oral four times a day PRN Indigestion  bisacodyl Suppository 10 milliGRAM(s) Rectal daily PRN Constipation  diphenhydrAMINE 25 milliGRAM(s) Oral every 4 hours PRN Rash and/or Itching  HYDROmorphone  Injectable 0.5 milliGRAM(s) IV Push every 3 hours PRN breakthrough pain  magnesium hydroxide Suspension 30 milliLiter(s) Oral daily PRN Constipation  ondansetron Injectable 4 milliGRAM(s) IV Push every 6 hours PRN Nausea and/or Vomiting  oxyCODONE    IR 10 milliGRAM(s) Oral every 4 hours PRN Severe Pain (7 - 10)  oxyCODONE    IR 5 milliGRAM(s) Oral every 4 hours PRN Moderate Pain (4 - 6)    MICRO:     STUDIES:

## 2019-04-28 NOTE — PROGRESS NOTE ADULT - ASSESSMENT
89 year old male POD#1 s/p intramedullary nail of L femur for a comminuted spiral proximal femur fracture who received TXA in OR (Jehovah Witness) currently with pain controlled

## 2019-04-28 NOTE — PROGRESS NOTE ADULT - SUBJECTIVE AND OBJECTIVE BOX
Patient seen and examined at bedside.  S/P left hip IM nail.  Resting comfortably, NAD.  Pain well-controlled with PRN analgesic.  No anesthesia complaints.

## 2019-04-28 NOTE — PROGRESS NOTE ADULT - SUBJECTIVE AND OBJECTIVE BOX
Patient seen and examined at bedside. Comfortable in bed, pain controlled. Denies fever/chills, SOB/chest pain, abdominal pain, numbness/tingling. no complaints.    Vital Signs Last 24 Hrs  T(C): 36.7 (28 Apr 2019 11:08), Max: 38.1 (27 Apr 2019 13:05)  T(F): 98 (28 Apr 2019 11:08), Max: 100.6 (27 Apr 2019 13:05)  HR: 87 (28 Apr 2019 11:08) (70 - 104)  BP: 123/67 (28 Apr 2019 11:08) (108/44 - 166/96)  BP(mean): --  RR: 18 (28 Apr 2019 11:08) (11 - 22)  SpO2: 94% (28 Apr 2019 11:08) (90% - 100%)    LLE: proximal dressing with small amount of serosanguinous staining, demarcated with marker. distal dressings C/D/I. SILT. + dorsi/plantarflexion. PT 2+. Calf soft NT B/L.                          7.4    7.1   )-----------( 116      ( 28 Apr 2019 08:07 )             22.7     A/P: 89 y.o M s/p left hip IMN POD #1  - WBAT  - DVTP  - patient is a Jehovah Witness, family amenable to alternative means to raise H/H, would recommend alternative medical intervention if appropriate  - [facility] care primary team

## 2019-04-29 LAB
ANION GAP SERPL CALC-SCNC: 9 MMOL/L — SIGNIFICANT CHANGE UP (ref 5–17)
BASOPHILS # BLD AUTO: 0 K/UL — SIGNIFICANT CHANGE UP (ref 0–0.2)
BASOPHILS NFR BLD AUTO: 0.3 % — SIGNIFICANT CHANGE UP (ref 0–2)
BUN SERPL-MCNC: 21 MG/DL — HIGH (ref 8–20)
CALCIUM SERPL-MCNC: 7.8 MG/DL — LOW (ref 8.6–10.2)
CHLORIDE SERPL-SCNC: 101 MMOL/L — SIGNIFICANT CHANGE UP (ref 98–107)
CO2 SERPL-SCNC: 28 MMOL/L — SIGNIFICANT CHANGE UP (ref 22–29)
CREAT SERPL-MCNC: 0.75 MG/DL — SIGNIFICANT CHANGE UP (ref 0.5–1.3)
EOSINOPHIL # BLD AUTO: 0 K/UL — SIGNIFICANT CHANGE UP (ref 0–0.5)
EOSINOPHIL NFR BLD AUTO: 0.4 % — SIGNIFICANT CHANGE UP (ref 0–5)
GLUCOSE SERPL-MCNC: 143 MG/DL — HIGH (ref 70–115)
HCT VFR BLD CALC: 21.6 % — LOW (ref 42–52)
HGB BLD-MCNC: 7 G/DL — CRITICAL LOW (ref 14–18)
LYMPHOCYTES # BLD AUTO: 1 K/UL — SIGNIFICANT CHANGE UP (ref 1–4.8)
LYMPHOCYTES # BLD AUTO: 14.1 % — LOW (ref 20–55)
MAGNESIUM SERPL-MCNC: 2.4 MG/DL — SIGNIFICANT CHANGE UP (ref 1.6–2.6)
MCHC RBC-ENTMCNC: 28.9 PG — SIGNIFICANT CHANGE UP (ref 27–31)
MCHC RBC-ENTMCNC: 32.4 G/DL — SIGNIFICANT CHANGE UP (ref 32–36)
MCV RBC AUTO: 89.3 FL — SIGNIFICANT CHANGE UP (ref 80–94)
MONOCYTES # BLD AUTO: 0.8 K/UL — SIGNIFICANT CHANGE UP (ref 0–0.8)
MONOCYTES NFR BLD AUTO: 10.6 % — HIGH (ref 3–10)
NEUTROPHILS # BLD AUTO: 5.4 K/UL — SIGNIFICANT CHANGE UP (ref 1.8–8)
NEUTROPHILS NFR BLD AUTO: 74.5 % — HIGH (ref 37–73)
PHOSPHATE SERPL-MCNC: 2.3 MG/DL — LOW (ref 2.4–4.7)
PLATELET # BLD AUTO: 116 K/UL — LOW (ref 150–400)
POTASSIUM SERPL-MCNC: 4.6 MMOL/L — SIGNIFICANT CHANGE UP (ref 3.5–5.3)
POTASSIUM SERPL-SCNC: 4.6 MMOL/L — SIGNIFICANT CHANGE UP (ref 3.5–5.3)
RBC # BLD: 2.42 M/UL — LOW (ref 4.6–6.2)
RBC # FLD: 14.6 % — SIGNIFICANT CHANGE UP (ref 11–15.6)
SODIUM SERPL-SCNC: 138 MMOL/L — SIGNIFICANT CHANGE UP (ref 135–145)
WBC # BLD: 7.3 K/UL — SIGNIFICANT CHANGE UP (ref 4.8–10.8)
WBC # FLD AUTO: 7.3 K/UL — SIGNIFICANT CHANGE UP (ref 4.8–10.8)

## 2019-04-29 PROCEDURE — 99231 SBSQ HOSP IP/OBS SF/LOW 25: CPT

## 2019-04-29 PROCEDURE — 99223 1ST HOSP IP/OBS HIGH 75: CPT

## 2019-04-29 RX ORDER — ENOXAPARIN SODIUM 100 MG/ML
30 INJECTION SUBCUTANEOUS EVERY 12 HOURS
Qty: 0 | Refills: 0 | Status: DISCONTINUED | OUTPATIENT
Start: 2019-04-29 | End: 2019-05-01

## 2019-04-29 RX ADMIN — MEMANTINE HYDROCHLORIDE 10 MILLIGRAM(S): 10 TABLET ORAL at 16:50

## 2019-04-29 RX ADMIN — ENOXAPARIN SODIUM 30 MILLIGRAM(S): 100 INJECTION SUBCUTANEOUS at 08:12

## 2019-04-29 RX ADMIN — FAMOTIDINE 20 MILLIGRAM(S): 10 INJECTION INTRAVENOUS at 16:50

## 2019-04-29 RX ADMIN — Medication 100 MILLIGRAM(S): at 21:34

## 2019-04-29 RX ADMIN — OXYCODONE HYDROCHLORIDE 5 MILLIGRAM(S): 5 TABLET ORAL at 17:48

## 2019-04-29 RX ADMIN — ENOXAPARIN SODIUM 30 MILLIGRAM(S): 100 INJECTION SUBCUTANEOUS at 16:50

## 2019-04-29 RX ADMIN — Medication 25 MILLIGRAM(S): at 16:50

## 2019-04-29 RX ADMIN — MEMANTINE HYDROCHLORIDE 10 MILLIGRAM(S): 10 TABLET ORAL at 05:14

## 2019-04-29 RX ADMIN — TAMSULOSIN HYDROCHLORIDE 0.4 MILLIGRAM(S): 0.4 CAPSULE ORAL at 21:34

## 2019-04-29 RX ADMIN — FAMOTIDINE 20 MILLIGRAM(S): 10 INJECTION INTRAVENOUS at 05:14

## 2019-04-29 RX ADMIN — Medication 100 MILLIGRAM(S): at 05:14

## 2019-04-29 RX ADMIN — Medication 5 MILLIGRAM(S): at 21:34

## 2019-04-29 RX ADMIN — FINASTERIDE 5 MILLIGRAM(S): 5 TABLET, FILM COATED ORAL at 11:09

## 2019-04-29 RX ADMIN — Medication 25 MILLIGRAM(S): at 05:14

## 2019-04-29 RX ADMIN — Medication 100 MILLIGRAM(S): at 11:09

## 2019-04-29 RX ADMIN — Medication 325 MILLIGRAM(S): at 07:11

## 2019-04-29 RX ADMIN — Medication 325 MILLIGRAM(S): at 11:09

## 2019-04-29 RX ADMIN — POLYETHYLENE GLYCOL 3350 17 GRAM(S): 17 POWDER, FOR SOLUTION ORAL at 16:50

## 2019-04-29 RX ADMIN — POLYETHYLENE GLYCOL 3350 17 GRAM(S): 17 POWDER, FOR SOLUTION ORAL at 05:14

## 2019-04-29 RX ADMIN — MODAFINIL 200 MILLIGRAM(S): 200 TABLET ORAL at 11:09

## 2019-04-29 RX ADMIN — ESCITALOPRAM OXALATE 20 MILLIGRAM(S): 10 TABLET, FILM COATED ORAL at 11:09

## 2019-04-29 RX ADMIN — Medication 325 MILLIGRAM(S): at 16:50

## 2019-04-29 RX ADMIN — Medication 1 MILLIGRAM(S): at 11:09

## 2019-04-29 NOTE — INPATIENT CERTIFICATION FOR MEDICARE PATIENTS - CURRENT MEDICAL NEEDS AND CARE PLANS
Possible Skilled Nursing Facilty (SNF)/Possible Acute Rehab
Breath sounds clear and equal bilaterally.

## 2019-04-29 NOTE — PROGRESS NOTE ADULT - ATTENDING COMMENTS
s/p left femur orif  avss  thigh soft mild swelling compared to contralateral side, good distal pulses  plan  hem 7.0  pt is a Jehovah witness, need to have witness precautions, minimize blood work, obtain consent sheet on what pt would accept , iron, thiamin, folate, consult Hematology .  f/u ortho
Agree with above assessment.  The patient was seen and examined by me.  Patient is without active complaints of pain, states overall he is comfortable.  He has a bowel movement, and is without abdominal discomfort.  Abdomen is soft and non distended. The left leg dressing sites are clean and intact.  The patient will benefit from PT/OT and will likely need rehab.  Case discussed with patient's son at bedside.  Ortho follow up and pain control as needed.

## 2019-04-29 NOTE — PROGRESS NOTE ADULT - SUBJECTIVE AND OBJECTIVE BOX
HPI/OVERNIGHT EVENTS:  No acute events overnight. Patient had some bowel movements yesterday. Worked with PT. Jerel f/c/n/v/cp/sob.    MEDICATIONS  (STANDING):  ceFAZolin   IVPB 2000 milliGRAM(s) IV Intermittent once  docusate sodium 100 milliGRAM(s) Oral three times a day  enoxaparin Injectable 30 milliGRAM(s) SubCutaneous every 12 hours  escitalopram 20 milliGRAM(s) Oral daily  famotidine    Tablet 20 milliGRAM(s) Oral every 12 hours  ferrous    sulfate 325 milliGRAM(s) Oral three times a day with meals  finasteride 5 milliGRAM(s) Oral daily  folic acid 1 milliGRAM(s) Oral daily  magnesium citrate Oral Solution 1 Bottle Oral daily  melatonin 5 milliGRAM(s) Oral at bedtime  memantine 10 milliGRAM(s) Oral two times a day  metoprolol tartrate 25 milliGRAM(s) Oral two times a day  modafinil 200 milliGRAM(s) Oral daily  polyethylene glycol 3350 17 Gram(s) Oral two times a day  tamsulosin 0.4 milliGRAM(s) Oral at bedtime    MEDICATIONS  (PRN):  acetaminophen   Tablet .. 650 milliGRAM(s) Oral every 6 hours PRN Temp greater or equal to 38C (100.4F), Mild Pain (1 - 3)  aluminum hydroxide/magnesium hydroxide/simethicone Suspension 30 milliLiter(s) Oral four times a day PRN Indigestion  bisacodyl Suppository 10 milliGRAM(s) Rectal daily PRN Constipation  diphenhydrAMINE 25 milliGRAM(s) Oral every 4 hours PRN Rash and/or Itching  HYDROmorphone  Injectable 0.5 milliGRAM(s) IV Push every 3 hours PRN breakthrough pain  magnesium hydroxide Suspension 30 milliLiter(s) Oral daily PRN Constipation  ondansetron Injectable 4 milliGRAM(s) IV Push every 6 hours PRN Nausea and/or Vomiting  oxyCODONE    IR 10 milliGRAM(s) Oral every 4 hours PRN Severe Pain (7 - 10)  oxyCODONE    IR 5 milliGRAM(s) Oral every 4 hours PRN Moderate Pain (4 - 6)      Vital Signs Last 24 Hrs  T(C): 36.7 (2019 04:20), Max: 36.8 (2019 19:56)  T(F): 98.1 (2019 04:20), Max: 98.3 (2019 19:56)  HR: 83 (2019 04:20) (81 - 93)  BP: 114/55 (2019 04:20) (114/55 - 140/65)  BP(mean): --  RR: 18 (2019 19:56) (18 - 18)  SpO2: 95% (2019 19:56) (94% - 95%)    gen: nad, a&ox3  cv: rrr  resp: nonlabored breathing  gi: soft, nd, nttp  msk: no c/c/e. LLE with dressings in place. able to wiggle toes  vasc: 2+ DP      I&O's Detail    2019 07:01  -  2019 07:00  --------------------------------------------------------  IN:    Oral Fluid: 200 mL  Total IN: 200 mL    OUT:  Total OUT: 0 mL    Total NET: 200 mL          LABS:                        7.4    7.1   )-----------( 116      ( 2019 08:07 )             22.7     04-28    134<L>  |  101  |  25.0<H>  ----------------------------<  184<H>  4.1   |  23.0  |  0.77    Ca    8.0<L>      2019 08:07  Phos  3.5     04-28  Mg     2.0     04-28        Urinalysis Basic - ( 2019 21:42 )    Color: Yellow / Appearance: Clear / S.020 / pH: x  Gluc: x / Ketone: Trace  / Bili: Negative / Urobili: Negative mg/dL   Blood: x / Protein: 30 mg/dL / Nitrite: Negative   Leuk Esterase: Negative / RBC: 0-2 /HPF / WBC 0-2   Sq Epi: x / Non Sq Epi: Occasional / Bacteria: Occasional

## 2019-04-29 NOTE — PROGRESS NOTE ADULT - SUBJECTIVE AND OBJECTIVE BOX
ORTHO-POST-OP PROGRESS NOTE:      [id number]    PRASHANT MORALES  PROCEDURE: Left femur IMN  DOS: 2      SUBJECTIVE: 89y Patient seen and examined. Reports that pain in controlled with current regimen. Patient denies of acute sensory or motor changes. Denies fever, cp, sob at this time.                            7.0    7.3   )-----------( 116      ( 29 Apr 2019 07:44 )             21.6               I&O's Detail    28 Apr 2019 07:01  -  29 Apr 2019 07:00  --------------------------------------------------------  IN:    Oral Fluid: 200 mL  Total IN: 200 mL    OUT:  Total OUT: 0 mL    Total NET: 200 mL      acetaminophen   Tablet .. 650 milliGRAM(s) Oral every 6 hours PRN  aluminum hydroxide/magnesium hydroxide/simethicone Suspension 30 milliLiter(s) Oral four times a day PRN  bisacodyl Suppository 10 milliGRAM(s) Rectal daily PRN  ceFAZolin   IVPB 2000 milliGRAM(s) IV Intermittent once  diphenhydrAMINE 25 milliGRAM(s) Oral every 4 hours PRN  docusate sodium 100 milliGRAM(s) Oral three times a day  enoxaparin Injectable 30 milliGRAM(s) SubCutaneous every 12 hours  escitalopram 20 milliGRAM(s) Oral daily  famotidine    Tablet 20 milliGRAM(s) Oral every 12 hours  ferrous    sulfate 325 milliGRAM(s) Oral three times a day with meals  finasteride 5 milliGRAM(s) Oral daily  folic acid 1 milliGRAM(s) Oral daily  HYDROmorphone  Injectable 0.5 milliGRAM(s) IV Push every 3 hours PRN  magnesium citrate Oral Solution 1 Bottle Oral daily  magnesium hydroxide Suspension 30 milliLiter(s) Oral daily PRN  melatonin 5 milliGRAM(s) Oral at bedtime  memantine 10 milliGRAM(s) Oral two times a day  metoprolol tartrate 25 milliGRAM(s) Oral two times a day  modafinil 200 milliGRAM(s) Oral daily  ondansetron Injectable 4 milliGRAM(s) IV Push every 6 hours PRN  oxyCODONE    IR 10 milliGRAM(s) Oral every 4 hours PRN  oxyCODONE    IR 5 milliGRAM(s) Oral every 4 hours PRN  polyethylene glycol 3350 17 Gram(s) Oral two times a day  tamsulosin 0.4 milliGRAM(s) Oral at bedtime        T(C): 36.3 (04-29-19 @ 09:03), Max: 36.8 (04-28-19 @ 19:56)  HR: 85 (04-29-19 @ 09:03) (81 - 93)  BP: 153/63 (04-29-19 @ 09:03) (114/55 - 153/63)  RR: 18 (04-29-19 @ 09:03) (18 - 18)  SpO2: 95% (04-29-19 @ 09:03) (94% - 95%)  Wt(kg): --      PHYSICAL EXAM:     Constitutional: Alert, responsive, in no acute distress.     Injured Extremity:          Surgical dressing Clean/dry/intact. Dressings and xeroform were removed for dressing change. incisions clean and dry. Healing well. No discharge noted or active bleeding. Staples in place. No bruising or erythema. New dressings were placed over three incisions and dated.            Sensation: normal to light touch. No focal deficits noted of the lower extremities.                Motor exam: 5/5 EHL/FHL. plantar flexion and dorsiflexion. Calf non tender b/l                                                              Vascular:  + warm well perfused                                                      A/P :  89y Male S/P Left femur IMN  POD#2    -  Pain control  -  DVT ppx as presribed  -  Continue with PT and out of bed  -  Weight bearing status: WBAT  -  Trend H/H. Primary team ordered cbc for tm.  -  Continue care per primary team  - ORTHO-POST-OP PROGRESS NOTE:      [id number]    PRASHANT MORALES  PROCEDURE: Left femur IMN  DOS: 2      SUBJECTIVE: 89y Patient seen and examined. Reports that pain in controlled with current regimen. Patient denies of acute sensory or motor changes. Denies fever, cp, sob at this time.                            7.0    7.3   )-----------( 116      ( 29 Apr 2019 07:44 )             21.6               I&O's Detail    28 Apr 2019 07:01  -  29 Apr 2019 07:00  --------------------------------------------------------  IN:    Oral Fluid: 200 mL  Total IN: 200 mL    OUT:  Total OUT: 0 mL    Total NET: 200 mL      acetaminophen   Tablet .. 650 milliGRAM(s) Oral every 6 hours PRN  aluminum hydroxide/magnesium hydroxide/simethicone Suspension 30 milliLiter(s) Oral four times a day PRN  bisacodyl Suppository 10 milliGRAM(s) Rectal daily PRN  ceFAZolin   IVPB 2000 milliGRAM(s) IV Intermittent once  diphenhydrAMINE 25 milliGRAM(s) Oral every 4 hours PRN  docusate sodium 100 milliGRAM(s) Oral three times a day  enoxaparin Injectable 30 milliGRAM(s) SubCutaneous every 12 hours  escitalopram 20 milliGRAM(s) Oral daily  famotidine    Tablet 20 milliGRAM(s) Oral every 12 hours  ferrous    sulfate 325 milliGRAM(s) Oral three times a day with meals  finasteride 5 milliGRAM(s) Oral daily  folic acid 1 milliGRAM(s) Oral daily  HYDROmorphone  Injectable 0.5 milliGRAM(s) IV Push every 3 hours PRN  magnesium citrate Oral Solution 1 Bottle Oral daily  magnesium hydroxide Suspension 30 milliLiter(s) Oral daily PRN  melatonin 5 milliGRAM(s) Oral at bedtime  memantine 10 milliGRAM(s) Oral two times a day  metoprolol tartrate 25 milliGRAM(s) Oral two times a day  modafinil 200 milliGRAM(s) Oral daily  ondansetron Injectable 4 milliGRAM(s) IV Push every 6 hours PRN  oxyCODONE    IR 10 milliGRAM(s) Oral every 4 hours PRN  oxyCODONE    IR 5 milliGRAM(s) Oral every 4 hours PRN  polyethylene glycol 3350 17 Gram(s) Oral two times a day  tamsulosin 0.4 milliGRAM(s) Oral at bedtime        T(C): 36.3 (04-29-19 @ 09:03), Max: 36.8 (04-28-19 @ 19:56)  HR: 85 (04-29-19 @ 09:03) (81 - 93)  BP: 153/63 (04-29-19 @ 09:03) (114/55 - 153/63)  RR: 18 (04-29-19 @ 09:03) (18 - 18)  SpO2: 95% (04-29-19 @ 09:03) (94% - 95%)  Wt(kg): --      PHYSICAL EXAM:     Constitutional: Alert, responsive, in no acute distress.     Injured Extremity:          Surgical dressing intact.  Minimal serosanguinous spotting noted on all three dressing. Dressings and xeroform were removed for dressing change. incisions clean and dry. Healing well. No discharge noted or active bleeding. Staples in place. No bruising or erythema. New dressings were placed over three incisions and dated.            Sensation: normal to light touch. No focal deficits noted of the lower extremities.                Motor exam: 5/5 EHL/FHL. plantar flexion and dorsiflexion. Calf non tender b/l                                                              Vascular:  + warm well perfused                                                      A/P :  89y Male S/P Left femur IMN  POD#2    -  Pain control  -  DVT ppx as presribed  -  Continue with PT and out of bed  -  Weight bearing status: WBAT  -  Trend H/H. Primary team ordered cbc for tm.  -  Continue care per primary team  -

## 2019-04-29 NOTE — OCCUPATIONAL THERAPY INITIAL EVALUATION ADULT - PLANNED THERAPY INTERVENTIONS, OT EVAL
ROM/strengthening/ADL retraining/balance training/motor coordination training/transfer training/bed mobility training

## 2019-04-29 NOTE — PROGRESS NOTE ADULT - ASSESSMENT
89 year old male POD#1 s/p intramedullary nail of L femur for a comminuted spiral proximal femur fracture who received TXA in OR (Jehovah Witness) currently with pain controlled   - WBAT LLE  - continue aggressive bowel regimen  - pain control PPX  - f/u PT/PMR  - dispo discharge planning

## 2019-04-30 ENCOUNTER — APPOINTMENT (OUTPATIENT)
Dept: INTERNAL MEDICINE | Facility: CLINIC | Age: 84
End: 2019-04-30

## 2019-04-30 LAB
FERRITIN SERPL-MCNC: 89 NG/ML — SIGNIFICANT CHANGE UP (ref 30–400)
RBC # BLD: 2.4 M/UL — LOW (ref 4.6–6.2)
RETICS #: 4.5 K/UL — LOW (ref 25–125)
RETICS/RBC NFR: 1.9 % — SIGNIFICANT CHANGE UP (ref 0.5–2.5)

## 2019-04-30 PROCEDURE — 99223 1ST HOSP IP/OBS HIGH 75: CPT

## 2019-04-30 PROCEDURE — 99232 SBSQ HOSP IP/OBS MODERATE 35: CPT

## 2019-04-30 RX ORDER — ERYTHROPOIETIN 10000 [IU]/ML
40000 INJECTION, SOLUTION INTRAVENOUS; SUBCUTANEOUS ONCE
Qty: 0 | Refills: 0 | Status: COMPLETED | OUTPATIENT
Start: 2019-04-30 | End: 2019-04-30

## 2019-04-30 RX ORDER — IRON SUCROSE 20 MG/ML
200 INJECTION, SOLUTION INTRAVENOUS EVERY 24 HOURS
Qty: 0 | Refills: 0 | Status: DISCONTINUED | OUTPATIENT
Start: 2019-04-30 | End: 2019-04-30

## 2019-04-30 RX ORDER — IRON SUCROSE 20 MG/ML
200 INJECTION, SOLUTION INTRAVENOUS EVERY 24 HOURS
Qty: 0 | Refills: 0 | Status: DISCONTINUED | OUTPATIENT
Start: 2019-04-30 | End: 2019-05-01

## 2019-04-30 RX ADMIN — Medication 25 MILLIGRAM(S): at 05:40

## 2019-04-30 RX ADMIN — POLYETHYLENE GLYCOL 3350 17 GRAM(S): 17 POWDER, FOR SOLUTION ORAL at 05:40

## 2019-04-30 RX ADMIN — Medication 100 MILLIGRAM(S): at 22:05

## 2019-04-30 RX ADMIN — Medication 100 MILLIGRAM(S): at 12:20

## 2019-04-30 RX ADMIN — MEMANTINE HYDROCHLORIDE 10 MILLIGRAM(S): 10 TABLET ORAL at 05:40

## 2019-04-30 RX ADMIN — MEMANTINE HYDROCHLORIDE 10 MILLIGRAM(S): 10 TABLET ORAL at 17:30

## 2019-04-30 RX ADMIN — FAMOTIDINE 20 MILLIGRAM(S): 10 INJECTION INTRAVENOUS at 17:30

## 2019-04-30 RX ADMIN — Medication 1 MILLIGRAM(S): at 12:18

## 2019-04-30 RX ADMIN — MODAFINIL 200 MILLIGRAM(S): 200 TABLET ORAL at 12:19

## 2019-04-30 RX ADMIN — Medication 325 MILLIGRAM(S): at 12:18

## 2019-04-30 RX ADMIN — ENOXAPARIN SODIUM 30 MILLIGRAM(S): 100 INJECTION SUBCUTANEOUS at 05:39

## 2019-04-30 RX ADMIN — ESCITALOPRAM OXALATE 20 MILLIGRAM(S): 10 TABLET, FILM COATED ORAL at 12:18

## 2019-04-30 RX ADMIN — ENOXAPARIN SODIUM 30 MILLIGRAM(S): 100 INJECTION SUBCUTANEOUS at 17:29

## 2019-04-30 RX ADMIN — IRON SUCROSE 110 MILLIGRAM(S): 20 INJECTION, SOLUTION INTRAVENOUS at 12:18

## 2019-04-30 RX ADMIN — Medication 25 MILLIGRAM(S): at 17:30

## 2019-04-30 RX ADMIN — FAMOTIDINE 20 MILLIGRAM(S): 10 INJECTION INTRAVENOUS at 05:40

## 2019-04-30 RX ADMIN — Medication 1 BOTTLE: at 12:18

## 2019-04-30 RX ADMIN — Medication 325 MILLIGRAM(S): at 08:17

## 2019-04-30 RX ADMIN — TAMSULOSIN HYDROCHLORIDE 0.4 MILLIGRAM(S): 0.4 CAPSULE ORAL at 22:05

## 2019-04-30 RX ADMIN — FINASTERIDE 5 MILLIGRAM(S): 5 TABLET, FILM COATED ORAL at 12:18

## 2019-04-30 RX ADMIN — Medication 325 MILLIGRAM(S): at 17:30

## 2019-04-30 RX ADMIN — ERYTHROPOIETIN 40000 UNIT(S): 10000 INJECTION, SOLUTION INTRAVENOUS; SUBCUTANEOUS at 16:47

## 2019-04-30 RX ADMIN — Medication 100 MILLIGRAM(S): at 05:40

## 2019-04-30 RX ADMIN — POLYETHYLENE GLYCOL 3350 17 GRAM(S): 17 POWDER, FOR SOLUTION ORAL at 17:30

## 2019-04-30 RX ADMIN — Medication 5 MILLIGRAM(S): at 22:05

## 2019-04-30 NOTE — PROGRESS NOTE ADULT - SUBJECTIVE AND OBJECTIVE BOX
Patient seen, consult to follow  Jew with hemoglobin 7 grams following IMN for left femoral shaft fracture.  Will start on Venofer/epogen as per Jew protocol.

## 2019-04-30 NOTE — CONSULT NOTE ADULT - ASSESSMENT
Pentecostal with hemoglobin 7 following surgery for left intertrochanteric hip fracture.  Because he can not receive blood products according to his carlos, we will institute regimen of procrit 40,000 units and venofer 200 mg daily x 3 while an inpatient at [facility].  If rehab transfer comes through, it is okay to transfer once the initial iron and erythropoietin doses have been given.

## 2019-04-30 NOTE — PROGRESS NOTE ADULT - ASSESSMENT
89 year old male POD#2 s/p intramedullary nail of L femur for a comminuted spiral proximal femur fracture who received TXA in OR (Jehovah Witness) currently with pain controlled    - WBAT LLE  - continue aggressive bowel regimen  - non narcotic pain control    - dispo discharge planning  - PMNR recs much appreciated   - Pending placement to acute rehab

## 2019-04-30 NOTE — CONSULT NOTE ADULT - SUBJECTIVE AND OBJECTIVE BOX
REASON FOR CONSULTATION    HPI:  89y Male BIB BLS s/p witnessed (by son) slip and ground level fall. denies HS or LOC. Denies dizziness or lightheadedness prior. Complaining only of L hip/upper leg pain. DEnies chest pain, sob or palpitations.  Mr. Altman is a [employer] - his hemoglobin fell to 7 grams post-surgery for intertrochanteric left hip fracture.    A airway intact, C collar placed, speaking full sentences  B equal breath sounds bilaterally  C radial/DP/femoral pulses intact bilaterally   D [id number], moving all fours, no focal deficits, pupils R 3mm reactive/L 3mm reactive  E patient fully exposed, +exernally rotated shortened L leg, no bleeding, provided warm blankets      ROS:  General: fatigue  Neuro: denies focal weakness, numbness or tingling  CV: denies chest pain, chest pressure or palpitations  Resp: denies shortness of breath, pleuritic pain, cough, or wheezing  GI: denies changes in bowel movement, melena or BRBPR  : denies dysuria, hematuria, urinary frequency or urinary urgency  MSK: denies myalgias    PAST MEDICAL & SURGICAL HISTORY:  Macular degeneration  UTI (urinary tract infection): 2013  Narcolepsy  Aortic stenosis  GI bleed  HLD (hyperlipidemia)  Depression  Anemia  Endocarditis: 2013  BPH (benign prostatic hyperplasia)  Fatigue  S/P TAVR (transcatheter aortic valve replacement)  S/P cataract extraction and insertion of intraocular lens: bilat    FAMILY HISTORY:  No pertinent family history in first degree relatives    SOCIAL HISTORY:  denies toxic habits, lives with son at home    ALLERGIES: No Known Allergies      HOME MEDICATIONS: reviewed, only blood thinner [id number]    --------------------------------------------------------------------------------------------    PHYSICAL EXAM:   General: NAD, Lying in bed comfortably  Neuro: A+Ox3  HEENT: NC/AT, EOMI  Neck: Soft, supple  Cardio: RRR, nml S1/S2  Resp: Good effort, CTA b/l  Thorax: No chest wall tenderness  GI/Abd: Soft, NT/ND, no rebound/guarding, no masses palpated  Vascular: All 4 extremities warm. palpable DP b/l, motor and sensation intact b/l feet  Skin: Intact, no breakdown  Lymphatic/Nodes: No palpable lymphadenopathy  Pelvis: stable  Musculoskeletal: All 4 extremities moving spontaneously, +limited L hip flexion, no spinal tenderness or stepoffs. +exernally rotated shortened L leg  --------------------------------------------------------------------------------------------    LABS                 13.0   9.3    )----------(  186       ( 27 Apr 2019 00:44 )               40.3      135    |  96     |  30.0   ----------------------------<  133        ( 27 Apr 2019 00:44 )  4.8     |  26.0   |  0.85     Ca    9.2        ( 27 Apr 2019 00:44 )  Mg     2.1       ( 27 Apr 2019 00:44 )    TPro  8.1    /  Alb  4.7    /  TBili  0.4    /  DBili  x      /  AST  27     /  ALT  15     /  AlkPhos  58     ( 27 Apr 2019 00:44 )    LIVER FUNCTIONS - ( 27 Apr 2019 00:44 )  Alb: 4.7 g/dL / Pro: 8.1 g/dL / ALK PHOS: 58 U/L / ALT: 15 U/L / AST: 27 U/L / GGT: x           PT/INR -  11.7 sec / 1.02 ratio   ( 27 Apr 2019 00:44 )    CARDIAC MARKERS ( 27 Apr 2019 00:44 )  x     / <0.01 ng/mL / 163 U/L / x     / 6.7 ng/mL      --------------------------------------------------------------------------------------------  IMAGING  CXR: no hemo ptx or acute fx  PXR: no obvious fx in pelvis  L hip fx: comminuted L prox femo shaft fx    ASSESSMENT: Patient is a 89y old male s/p witnessed mechanical fall with L proximal femoral shaft fracture    PLAN:    - admit to trauma floor monitored bed with   - OR tomorrow with ortho  - EKG, echo, cards consult  - CT A/P with IV contrast  - NPO  - IVF  - pain control  - DVT ppx  - strict bedrest  - strict I/Os  - Plan discussed with Attending, Dr. Frances (27 Apr 2019 02:12)      REVIEW OF SYSTEMS:    CONSTITUTIONAL: No fever, weight loss, or fatigue  EYES: No eye pain, visual disturbances, or discharge  ENMT:  No difficulty hearing, tinnitus, vertigo; No sinus or throat pain  NECK: No pain or stiffness  BREASTS: No pain, masses, or nipple discharge  RESPIRATORY: No cough, wheezing, chills or hemoptysis; No shortness of breath  CARDIOVASCULAR: No chest pain, palpitations, dizziness, or leg swelling  GASTROINTESTINAL: No abdominal or epigastric pain. No nausea, vomiting, or hematemesis; No diarrhea or constipation. No melena or hematochezia.  GENITOURINARY: No dysuria, frequency, hematuria, or incontinence  NEUROLOGICAL: No headaches, memory loss, loss of strength, numbness, or tremors  SKIN: No itching, burning, rashes, or lesions   LYMPH NODES: No enlarged glands  ENDOCRINE: No heat or cold intolerance; No hair loss  MUSCULOSKELETAL: No joint pain or swelling; No muscle, back, or extremity pain  PSYCHIATRIC: No depression, anxiety, mood swings, or difficulty sleeping  HEME/LYMPH: No easy bruising, or bleeding gums  ALLERY AND IMMUNOLOGIC: No hives or eczema    PAST MEDICAL & SURGICAL HISTORY:  Macular degeneration  UTI (urinary tract infection): 2013  Narcolepsy  Aortic stenosis  GI bleed  HLD (hyperlipidemia)  Depression  Anemia  Endocarditis: 2013  BPH (benign prostatic hyperplasia)  Fatigue  S/P TAVR (transcatheter aortic valve replacement)  S/P cataract extraction and insertion of intraocular lens: bilat      SOCIAL HISTORY:    FAMILY HISTORY:  No pertinent family history in first degree relatives      SOCIAL HISTORY:    Allergies    No Known Allergies    Intolerances        MEDICATIONS  (STANDING):  docusate sodium 100 milliGRAM(s) Oral three times a day  enoxaparin Injectable 30 milliGRAM(s) SubCutaneous every 12 hours  epoetin cheng Injectable 62439 Unit(s) SubCutaneous once  escitalopram 20 milliGRAM(s) Oral daily  famotidine    Tablet 20 milliGRAM(s) Oral every 12 hours  ferrous    sulfate 325 milliGRAM(s) Oral three times a day with meals  finasteride 5 milliGRAM(s) Oral daily  folic acid 1 milliGRAM(s) Oral daily  iron sucrose IVPB 200 milliGRAM(s) IV Intermittent every 24 hours  magnesium citrate Oral Solution 1 Bottle Oral daily  melatonin 5 milliGRAM(s) Oral at bedtime  memantine 10 milliGRAM(s) Oral two times a day  metoprolol tartrate 25 milliGRAM(s) Oral two times a day  modafinil 200 milliGRAM(s) Oral daily  polyethylene glycol 3350 17 Gram(s) Oral two times a day  tamsulosin 0.4 milliGRAM(s) Oral at bedtime    MEDICATIONS  (PRN):  acetaminophen   Tablet .. 650 milliGRAM(s) Oral every 6 hours PRN Temp greater or equal to 38C (100.4F), Mild Pain (1 - 3)  aluminum hydroxide/magnesium hydroxide/simethicone Suspension 30 milliLiter(s) Oral four times a day PRN Indigestion  bisacodyl Suppository 10 milliGRAM(s) Rectal daily PRN Constipation  diphenhydrAMINE 25 milliGRAM(s) Oral every 4 hours PRN Rash and/or Itching  magnesium hydroxide Suspension 30 milliLiter(s) Oral daily PRN Constipation  ondansetron Injectable 4 milliGRAM(s) IV Push every 6 hours PRN Nausea and/or Vomiting      Vital Signs Last 24 Hrs  T(C): 37 (30 Apr 2019 16:05), Max: 37 (30 Apr 2019 16:05)  T(F): 98.6 (30 Apr 2019 16:05), Max: 98.6 (30 Apr 2019 16:05)  HR: 88 (30 Apr 2019 16:05) (60 - 88)  BP: 144/68 (30 Apr 2019 16:05) (125/51 - 144/68)  BP(mean): --  RR: 18 (30 Apr 2019 16:05) (18 - 20)  SpO2: 95% (30 Apr 2019 16:05) (91% - 96%)    PHYSICAL EXAM:    GENERAL: elderly, pale  HEAD:  Atraumatic, Normocephalic  EYES: EOMI, PERRLA, conjunctiva and sclera clear  ENMT: No tonsillar erythema, exudates, or enlargement; Moist mucous membranes, Good dentition, No lesions  NECK: Supple, No JVD, Normal thyroid  NERVOUS SYSTEM:  Alert & Oriented X3, Good concentration; Motor Strength 5/5 B/L upper and lower extremities; DTRs 2+ intact and symmetric  CHEST/LUNG: Clear to percussion bilaterally; No rales, rhonchi, wheezing, or rubs  HEART: Regular rate and rhythm; No murmurs, rubs, or gallops  ABDOMEN: Soft, Nontender, Nondistended; Bowel sounds present  EXTREMITIES:  2+ Peripheral Pulses, No clubbing, cyanosis, or edema  LYMPH: No lymphadenopathy noted  SKIN: No rashes or lesions      LABS:                        7.0    7.3   )-----------( 116      ( 29 Apr 2019 07:44 )             21.6     04-29    138  |  101  |  21.0<H>  ----------------------------<  143<H>  4.6   |  28.0  |  0.75    Ca    7.8<L>      29 Apr 2019 07:44  Phos  2.3     04-29  Mg     2.4     04-29

## 2019-04-30 NOTE — PROGRESS NOTE ADULT - SUBJECTIVE AND OBJECTIVE BOX
Pt Name: PRASHANT MORALES    MRN: [id number]      Patient is a 89 yo M being followed for L femoral shaft fracture. Patient is POD #3 s/p IMN. No acute events reported overnight. Patient was seen and evaluated at bedside this AM. Patient is doing well. Pain is well controlled. No orthopaedic complaints are expressed at this time. The patient is tolerating diet and participating in bedside PT. Patient denies fever, chills, chest pain, SOB, abdominal pain, calf pain, numbness, or weakness.       PAST MEDICAL & SURGICAL HISTORY:  Macular degeneration  UTI (urinary tract infection): 2013  Narcolepsy  Aortic stenosis  GI bleed  HLD (hyperlipidemia)  Depression  Anemia  Endocarditis: 2013  BPH (benign prostatic hyperplasia)  Fatigue  S/P TAVR (transcatheter aortic valve replacement)  S/P cataract extraction and insertion of intraocular lens: bilat      Allergies: No Known Allergies      Medications: acetaminophen   Tablet .. 650 milliGRAM(s) Oral every 6 hours PRN  aluminum hydroxide/magnesium hydroxide/simethicone Suspension 30 milliLiter(s) Oral four times a day PRN  bisacodyl Suppository 10 milliGRAM(s) Rectal daily PRN  ceFAZolin   IVPB 2000 milliGRAM(s) IV Intermittent once  diphenhydrAMINE 25 milliGRAM(s) Oral every 4 hours PRN  docusate sodium 100 milliGRAM(s) Oral three times a day  enoxaparin Injectable 30 milliGRAM(s) SubCutaneous every 12 hours  escitalopram 20 milliGRAM(s) Oral daily  famotidine    Tablet 20 milliGRAM(s) Oral every 12 hours  ferrous    sulfate 325 milliGRAM(s) Oral three times a day with meals  finasteride 5 milliGRAM(s) Oral daily  folic acid 1 milliGRAM(s) Oral daily  HYDROmorphone  Injectable 0.5 milliGRAM(s) IV Push every 3 hours PRN  magnesium citrate Oral Solution 1 Bottle Oral daily  magnesium hydroxide Suspension 30 milliLiter(s) Oral daily PRN  melatonin 5 milliGRAM(s) Oral at bedtime  memantine 10 milliGRAM(s) Oral two times a day  metoprolol tartrate 25 milliGRAM(s) Oral two times a day  modafinil 200 milliGRAM(s) Oral daily  ondansetron Injectable 4 milliGRAM(s) IV Push every 6 hours PRN  oxyCODONE    IR 10 milliGRAM(s) Oral every 4 hours PRN  oxyCODONE    IR 5 milliGRAM(s) Oral every 4 hours PRN  polyethylene glycol 3350 17 Gram(s) Oral two times a day  tamsulosin 0.4 milliGRAM(s) Oral at bedtime                          7.0    7.3   )-----------( 116      ( 29 Apr 2019 07:44 )             21.6     04-29    138  |  101  |  21.0<H>  ----------------------------<  143<H>  4.6   |  28.0  |  0.75    Ca    7.8<L>      29 Apr 2019 07:44  Phos  2.3     04-29  Mg     2.4     04-29        PHYSICAL EXAM:    Vital Signs Last 24 Hrs  T(C): 36.7 (30 Apr 2019 05:42), Max: 36.7 (29 Apr 2019 16:50)  T(F): 98.1 (30 Apr 2019 05:42), Max: 98.1 (29 Apr 2019 16:50)  HR: 84 (30 Apr 2019 05:42) (60 - 85)  BP: 142/60 (30 Apr 2019 05:42) (125/51 - 153/63)  BP(mean): --  RR: 20 (30 Apr 2019 05:42) (18 - 20)  SpO2: 95% (30 Apr 2019 05:42) (91% - 95%)  Daily     Daily     Appearance: Alert, responsive, in no acute distress.    Skin: no rash on visible skin. Skin is clean, dry and intact. No bleeding. No abrasions. No ulcerations.    Musculoskeletal:         Left Lower Extremity: Calf supple and nontender. Skin warm and intact where exposed. No visible erythema or induration. Dressing is clean, dry, and intact. Minimal TTP of surgical site. Thigh compartments soft and compressible. Hip ROM not assessed at this time. SILT distally. TA/GSC/EHL/FHL intact. BCR.        Right Lower Extremity: Calf supple and nontender.       A/P:  Pt is a 89y Male with POD #3 s/p L femur IMN.    PLAN:   -Pain control.  -DVT PPx as per primary team.  -Bedside PT.  -WBAT.  -Monitor H/H.  -Incentive lilian.  -Remaining management as per primary team.

## 2019-04-30 NOTE — PROGRESS NOTE ADULT - SUBJECTIVE AND OBJECTIVE BOX
Patient in bed, reporting fatigue.   Limited conversation today.   Reports that he has pain in the hip.   Unable to move it today.  Limited movement in his right leg as well.   As per [facility], patient family choosing [facility].     FUNCTIONAL PROGRESS  4/30  Bed Mobility  Bed Mobility Training Sit-to-Supine: maximum assist (25% patient effort);  1 person assist;  verbal cues;  bed rails  Bed Mobility Training Supine-to-Sit: maximum assist (25% patient effort);  1 person assist;  verbal cues;  bed rails  Bed Mobility Training Limitations: decreased ability to use arms for pushing/pulling;  impaired ability to control trunk for mobility;  decreased strength    Sit-Stand Transfer Training  Transfer Training Sit-to-Stand Transfer: maximum assist (25% patient effort);  2 person assist;  verbal cues;  weight-bearing as tolerated   rolling walker  Transfer Training Stand-to-Sit Transfer: maximum assist (25% patient effort);  2 person assist;  weight-bearing as tolerated   rolling walker  Sit-to-Stand Transfer Training Transfer Safety Analysis: decreased sequencing ability;  decreased weight-shifting ability;  decreased strength;  rolling walker    Gait Training  Gait Training: maximum assist (25% patient effort);  2 person assist;  verbal cues;  weight-bearing as tolerated   rolling walker;  2 side steps  Gait Analysis: swing-to gait   crouch;  decreased hip/knee flexion;  shuffling;  decreased step length;  decreased strength;  rolling walker        REVIEW OF SYSTEMS  Constitutional - No fever,  +fatigue  Neurological - +loss of strength  Musculoskeletal - +joint pain    VITALS  T(C): 36.6 (04-30-19 @ 10:20), Max: 36.7 (04-29-19 @ 16:50)  HR: 69 (04-30-19 @ 10:20) (60 - 84)  BP: 128/59 (04-30-19 @ 10:20) (125/51 - 142/60)  RR: 19 (04-30-19 @ 10:20) (19 - 20)  SpO2: 96% (04-30-19 @ 10:20) (91% - 96%)  Wt(kg): --    MEDICATIONS   acetaminophen   Tablet .. 650 milliGRAM(s) every 6 hours PRN  aluminum hydroxide/magnesium hydroxide/simethicone Suspension 30 milliLiter(s) four times a day PRN  bisacodyl Suppository 10 milliGRAM(s) daily PRN  diphenhydrAMINE 25 milliGRAM(s) every 4 hours PRN  docusate sodium 100 milliGRAM(s) three times a day  enoxaparin Injectable 30 milliGRAM(s) every 12 hours  epoetin cheng Injectable 55377 Unit(s) once  escitalopram 20 milliGRAM(s) daily  famotidine    Tablet 20 milliGRAM(s) every 12 hours  ferrous    sulfate 325 milliGRAM(s) three times a day with meals  finasteride 5 milliGRAM(s) daily  folic acid 1 milliGRAM(s) daily  iron sucrose IVPB 200 milliGRAM(s) every 24 hours  magnesium citrate Oral Solution 1 Bottle daily  magnesium hydroxide Suspension 30 milliLiter(s) daily PRN  melatonin 5 milliGRAM(s) at bedtime  memantine 10 milliGRAM(s) two times a day  metoprolol tartrate 25 milliGRAM(s) two times a day  modafinil 200 milliGRAM(s) daily  ondansetron Injectable 4 milliGRAM(s) every 6 hours PRN  polyethylene glycol 3350 17 Gram(s) two times a day  tamsulosin 0.4 milliGRAM(s) at bedtime      RECENT LABS - Reviewed                        7.0    7.3   )-----------( 116      ( 29 Apr 2019 07:44 )             21.6     04-29    138  |  101  |  21.0<H>  ----------------------------<  143<H>  4.6   |  28.0  |  0.75    Ca    7.8<L>      29 Apr 2019 07:44  Phos  2.3     04-29  Mg     2.4     04-29              ----------------------------------------------------------------------------------------  PHYSICAL EXAM  Constitutional - NAD, Comfortable  Extremities - Swelling of the right LE  Neurologic Exam -                    Cognitive - Awake, Alert,  fatigued     Motor - Left shoulder weakness and BLE weakness                    LEFT    UE - ShAB 2/5, EF 4/5, EE 3/5,  5/5                    RIGHT UE - ShAB 4/5, EF 4/5, EE 4/5,  5/5                    LEFT    LE - 0/5                    RIGHT LE - 1/5      Sensory - Intact to LT  Psychiatric - fatigued  ----------------------------------------------------------------------------------------  ASSESSMENT/PLAN  89yMale with functional deficits after  a trauma related to a fall sustaining a left femoral fracture  Left femoral fracture s/p IMN - WBAT   Acute blood loss anemia - Worsening, Folate Iron, Monitoring, alternate means of supplementation given Buddhism beliefs  Pain - Tylenol, Dilaudid, Oxycodone, Consider Lidoderm patches  Memory - Namenda  Sleep Melatonin  Constipation - Dulcolax, MOM, Miralax, Colace  Mood - Lexapro  DVT PPX - SCDs, Lovenox  Rehab - Patient family choosing [facility]. Continue bedside therapy as well as OOB throughout the day with mobilization throughout the day with staff to maintain cardiopulmonary function and prevention of secondary complications related to debility.

## 2019-04-30 NOTE — PROGRESS NOTE ADULT - SUBJECTIVE AND OBJECTIVE BOX
Comfortable in bed, reports no pain or complaints.       MEDICATIONS  (STANDING):  docusate sodium 100 milliGRAM(s) Oral three times a day  enoxaparin Injectable 30 milliGRAM(s) SubCutaneous every 12 hours  epoetin cheng Injectable 77495 Unit(s) SubCutaneous once  escitalopram 20 milliGRAM(s) Oral daily  famotidine    Tablet 20 milliGRAM(s) Oral every 12 hours  ferrous    sulfate 325 milliGRAM(s) Oral three times a day with meals  finasteride 5 milliGRAM(s) Oral daily  folic acid 1 milliGRAM(s) Oral daily  iron sucrose IVPB 200 milliGRAM(s) IV Intermittent every 24 hours  magnesium citrate Oral Solution 1 Bottle Oral daily  melatonin 5 milliGRAM(s) Oral at bedtime  memantine 10 milliGRAM(s) Oral two times a day  metoprolol tartrate 25 milliGRAM(s) Oral two times a day  modafinil 200 milliGRAM(s) Oral daily  polyethylene glycol 3350 17 Gram(s) Oral two times a day  tamsulosin 0.4 milliGRAM(s) Oral at bedtime    MEDICATIONS  (PRN):  acetaminophen   Tablet .. 650 milliGRAM(s) Oral every 6 hours PRN Temp greater or equal to 38C (100.4F), Mild Pain (1 - 3)  aluminum hydroxide/magnesium hydroxide/simethicone Suspension 30 milliLiter(s) Oral four times a day PRN Indigestion  bisacodyl Suppository 10 milliGRAM(s) Rectal daily PRN Constipation  diphenhydrAMINE 25 milliGRAM(s) Oral every 4 hours PRN Rash and/or Itching  magnesium hydroxide Suspension 30 milliLiter(s) Oral daily PRN Constipation  ondansetron Injectable 4 milliGRAM(s) IV Push every 6 hours PRN Nausea and/or Vomiting      Vital Signs Last 24 Hrs  T(C): 36.6 (30 Apr 2019 10:20), Max: 36.7 (29 Apr 2019 16:50)  T(F): 97.8 (30 Apr 2019 10:20), Max: 98.1 (29 Apr 2019 16:50)  HR: 69 (30 Apr 2019 10:20) (60 - 84)  BP: 128/59 (30 Apr 2019 10:20) (125/51 - 142/60)  BP(mean): --  RR: 19 (30 Apr 2019 10:20) (19 - 20)  SpO2: 96% (30 Apr 2019 10:20) (91% - 96%)    PE  Gen:  Pulm:  CV:  Abd:  :  Ext:  Vasc:  Neuro:      I&O's Detail    29 Apr 2019 07:01  -  30 Apr 2019 07:00  --------------------------------------------------------  IN:    Oral Fluid: 120 mL  Total IN: 120 mL    OUT:    Stool: 2 mL  Total OUT: 2 mL    Total NET: 118 mL          LABS:                        7.0    7.3   )-----------( 116      ( 29 Apr 2019 07:44 )             21.6     04-29    138  |  101  |  21.0<H>  ----------------------------<  143<H>  4.6   |  28.0  |  0.75    Ca    7.8<L>      29 Apr 2019 07:44  Phos  2.3     04-29  Mg     2.4     04-29            RADIOLOGY & ADDITIONAL STUDIES:

## 2019-05-01 ENCOUNTER — TRANSCRIPTION ENCOUNTER (OUTPATIENT)
Age: 84
End: 2019-05-01

## 2019-05-01 VITALS
OXYGEN SATURATION: 94 % | SYSTOLIC BLOOD PRESSURE: 124 MMHG | HEART RATE: 78 BPM | DIASTOLIC BLOOD PRESSURE: 61 MMHG | TEMPERATURE: 98 F | RESPIRATION RATE: 18 BRPM

## 2019-05-01 PROCEDURE — C1769: CPT

## 2019-05-01 PROCEDURE — 97530 THERAPEUTIC ACTIVITIES: CPT

## 2019-05-01 PROCEDURE — 73502 X-RAY EXAM HIP UNI 2-3 VIEWS: CPT

## 2019-05-01 PROCEDURE — 86900 BLOOD TYPING SEROLOGIC ABO: CPT

## 2019-05-01 PROCEDURE — 96374 THER/PROPH/DIAG INJ IV PUSH: CPT

## 2019-05-01 PROCEDURE — 85045 AUTOMATED RETICULOCYTE COUNT: CPT

## 2019-05-01 PROCEDURE — 80048 BASIC METABOLIC PNL TOTAL CA: CPT

## 2019-05-01 PROCEDURE — 85610 PROTHROMBIN TIME: CPT

## 2019-05-01 PROCEDURE — 97167 OT EVAL HIGH COMPLEX 60 MIN: CPT

## 2019-05-01 PROCEDURE — 99285 EMERGENCY DEPT VISIT HI MDM: CPT | Mod: 25

## 2019-05-01 PROCEDURE — 74177 CT ABD & PELVIS W/CONTRAST: CPT

## 2019-05-01 PROCEDURE — 82550 ASSAY OF CK (CPK): CPT

## 2019-05-01 PROCEDURE — 36415 COLL VENOUS BLD VENIPUNCTURE: CPT

## 2019-05-01 PROCEDURE — C1713: CPT

## 2019-05-01 PROCEDURE — 97116 GAIT TRAINING THERAPY: CPT

## 2019-05-01 PROCEDURE — 84484 ASSAY OF TROPONIN QUANT: CPT

## 2019-05-01 PROCEDURE — 73551 X-RAY EXAM OF FEMUR 1: CPT

## 2019-05-01 PROCEDURE — 71045 X-RAY EXAM CHEST 1 VIEW: CPT

## 2019-05-01 PROCEDURE — 99231 SBSQ HOSP IP/OBS SF/LOW 25: CPT

## 2019-05-01 PROCEDURE — 86850 RBC ANTIBODY SCREEN: CPT

## 2019-05-01 PROCEDURE — 70450 CT HEAD/BRAIN W/O DYE: CPT

## 2019-05-01 PROCEDURE — 82553 CREATINE MB FRACTION: CPT

## 2019-05-01 PROCEDURE — 76000 FLUOROSCOPY <1 HR PHYS/QHP: CPT

## 2019-05-01 PROCEDURE — 73552 X-RAY EXAM OF FEMUR 2/>: CPT

## 2019-05-01 PROCEDURE — 73501 X-RAY EXAM HIP UNI 1 VIEW: CPT

## 2019-05-01 PROCEDURE — 83690 ASSAY OF LIPASE: CPT

## 2019-05-01 PROCEDURE — 99232 SBSQ HOSP IP/OBS MODERATE 35: CPT

## 2019-05-01 PROCEDURE — 93005 ELECTROCARDIOGRAM TRACING: CPT

## 2019-05-01 PROCEDURE — 81001 URINALYSIS AUTO W/SCOPE: CPT

## 2019-05-01 PROCEDURE — 84100 ASSAY OF PHOSPHORUS: CPT

## 2019-05-01 PROCEDURE — 85027 COMPLETE CBC AUTOMATED: CPT

## 2019-05-01 PROCEDURE — 97535 SELF CARE MNGMENT TRAINING: CPT

## 2019-05-01 PROCEDURE — 86901 BLOOD TYPING SEROLOGIC RH(D): CPT

## 2019-05-01 PROCEDURE — 93306 TTE W/DOPPLER COMPLETE: CPT

## 2019-05-01 PROCEDURE — 97110 THERAPEUTIC EXERCISES: CPT

## 2019-05-01 PROCEDURE — 82728 ASSAY OF FERRITIN: CPT

## 2019-05-01 PROCEDURE — 97163 PT EVAL HIGH COMPLEX 45 MIN: CPT

## 2019-05-01 PROCEDURE — 83735 ASSAY OF MAGNESIUM: CPT

## 2019-05-01 PROCEDURE — 80053 COMPREHEN METABOLIC PANEL: CPT

## 2019-05-01 RX ORDER — DOCUSATE SODIUM 100 MG
1 CAPSULE ORAL
Qty: 0 | Refills: 0 | DISCHARGE
Start: 2019-05-01

## 2019-05-01 RX ORDER — FERROUS SULFATE 325(65) MG
1 TABLET ORAL
Qty: 45 | Refills: 0
Start: 2019-05-01 | End: 2019-05-15

## 2019-05-01 RX ORDER — MEMANTINE HYDROCHLORIDE 10 MG/1
1 TABLET ORAL
Qty: 0 | Refills: 0 | COMMUNITY

## 2019-05-01 RX ORDER — FOLIC ACID 0.8 MG
1 TABLET ORAL
Qty: 0 | Refills: 0 | DISCHARGE
Start: 2019-05-01

## 2019-05-01 RX ORDER — TAMSULOSIN HYDROCHLORIDE 0.4 MG/1
1 CAPSULE ORAL
Qty: 0 | Refills: 0 | DISCHARGE
Start: 2019-05-01

## 2019-05-01 RX ORDER — FAMOTIDINE 10 MG/ML
1 INJECTION INTRAVENOUS
Qty: 0 | Refills: 0 | DISCHARGE
Start: 2019-05-01

## 2019-05-01 RX ORDER — METOPROLOL TARTRATE 50 MG
1 TABLET ORAL
Qty: 0 | Refills: 0 | COMMUNITY

## 2019-05-01 RX ORDER — ACETAMINOPHEN 500 MG
2 TABLET ORAL
Qty: 0 | Refills: 0 | DISCHARGE
Start: 2019-05-01

## 2019-05-01 RX ADMIN — Medication 325 MILLIGRAM(S): at 10:15

## 2019-05-01 RX ADMIN — FINASTERIDE 5 MILLIGRAM(S): 5 TABLET, FILM COATED ORAL at 13:51

## 2019-05-01 RX ADMIN — ENOXAPARIN SODIUM 30 MILLIGRAM(S): 100 INJECTION SUBCUTANEOUS at 17:29

## 2019-05-01 RX ADMIN — Medication 25 MILLIGRAM(S): at 05:36

## 2019-05-01 RX ADMIN — Medication 325 MILLIGRAM(S): at 13:52

## 2019-05-01 RX ADMIN — Medication 25 MILLIGRAM(S): at 17:29

## 2019-05-01 RX ADMIN — MODAFINIL 200 MILLIGRAM(S): 200 TABLET ORAL at 13:51

## 2019-05-01 RX ADMIN — Medication 1 MILLIGRAM(S): at 13:51

## 2019-05-01 RX ADMIN — MEMANTINE HYDROCHLORIDE 10 MILLIGRAM(S): 10 TABLET ORAL at 17:29

## 2019-05-01 RX ADMIN — Medication 100 MILLIGRAM(S): at 05:36

## 2019-05-01 RX ADMIN — Medication 325 MILLIGRAM(S): at 17:29

## 2019-05-01 RX ADMIN — FAMOTIDINE 20 MILLIGRAM(S): 10 INJECTION INTRAVENOUS at 05:36

## 2019-05-01 RX ADMIN — ESCITALOPRAM OXALATE 20 MILLIGRAM(S): 10 TABLET, FILM COATED ORAL at 13:51

## 2019-05-01 RX ADMIN — IRON SUCROSE 110 MILLIGRAM(S): 20 INJECTION, SOLUTION INTRAVENOUS at 15:09

## 2019-05-01 RX ADMIN — ENOXAPARIN SODIUM 30 MILLIGRAM(S): 100 INJECTION SUBCUTANEOUS at 05:36

## 2019-05-01 RX ADMIN — MEMANTINE HYDROCHLORIDE 10 MILLIGRAM(S): 10 TABLET ORAL at 05:36

## 2019-05-01 RX ADMIN — POLYETHYLENE GLYCOL 3350 17 GRAM(S): 17 POWDER, FOR SOLUTION ORAL at 05:36

## 2019-05-01 RX ADMIN — Medication 100 MILLIGRAM(S): at 13:51

## 2019-05-01 RX ADMIN — POLYETHYLENE GLYCOL 3350 17 GRAM(S): 17 POWDER, FOR SOLUTION ORAL at 17:29

## 2019-05-01 RX ADMIN — FAMOTIDINE 20 MILLIGRAM(S): 10 INJECTION INTRAVENOUS at 17:29

## 2019-05-01 NOTE — DISCHARGE NOTE PROVIDER - HOSPITAL COURSE
89y Male BIB BLS s/p witnessed (by son) slip and ground level fall on April 4/27. Per patient, he denied HS nor LOC. He also denied dizziness or lightheadedness prior to the fall. His only complaint was that of L hip/upper leg pain. Xray showed a L proximal femoral shaft fracture. Patient was subsequently taken to the OR by ortho for IMN of L femur. Patient was recovering well postoperatively but noted to become anemic, Hb dropping to 7. Patient was seen by HemOnc since he is of the [employer] carlos and would not accept blood products. HemOnc was started on Venofer/epogen, iron and folate supplements. Patient was evaluated by PT/OT and PMR who determined in accordance with patient's family wishes that the patient be discharged to [facility]. Per Ortho patient is weight bearing as tolerated to LLE. Patient to follow up with Urology since CT A/P showed an enlarged heterogenous prostate gland with irregular nodular contour measuring 7.5 cm transversely.

## 2019-05-01 NOTE — PROGRESS NOTE ADULT - SUBJECTIVE AND OBJECTIVE BOX
INTERVAL HPI/OVERNIGHT EVENTS: NAOE    SUBJECTIVE: Patient feeling well; no complaints, very pleasant, passing BMs, tolerating diet, ambulating to restroom, voiding well    MEDICATIONS  (STANDING):  docusate sodium 100 milliGRAM(s) Oral three times a day  enoxaparin Injectable 30 milliGRAM(s) SubCutaneous every 12 hours  escitalopram 20 milliGRAM(s) Oral daily  famotidine    Tablet 20 milliGRAM(s) Oral every 12 hours  ferrous    sulfate 325 milliGRAM(s) Oral three times a day with meals  finasteride 5 milliGRAM(s) Oral daily  folic acid 1 milliGRAM(s) Oral daily  iron sucrose IVPB 200 milliGRAM(s) IV Intermittent every 24 hours  magnesium citrate Oral Solution 1 Bottle Oral daily  melatonin 5 milliGRAM(s) Oral at bedtime  memantine 10 milliGRAM(s) Oral two times a day  metoprolol tartrate 25 milliGRAM(s) Oral two times a day  modafinil 200 milliGRAM(s) Oral daily  polyethylene glycol 3350 17 Gram(s) Oral two times a day  tamsulosin 0.4 milliGRAM(s) Oral at bedtime    MEDICATIONS  (PRN):  acetaminophen   Tablet .. 650 milliGRAM(s) Oral every 6 hours PRN Temp greater or equal to 38C (100.4F), Mild Pain (1 - 3)  aluminum hydroxide/magnesium hydroxide/simethicone Suspension 30 milliLiter(s) Oral four times a day PRN Indigestion  bisacodyl Suppository 10 milliGRAM(s) Rectal daily PRN Constipation  diphenhydrAMINE 25 milliGRAM(s) Oral every 4 hours PRN Rash and/or Itching  magnesium hydroxide Suspension 30 milliLiter(s) Oral daily PRN Constipation  ondansetron Injectable 4 milliGRAM(s) IV Push every 6 hours PRN Nausea and/or Vomiting      Vital Signs Last 24 Hrs  T(C): 36.9 (01 May 2019 05:01), Max: 37.2 (30 Apr 2019 20:00)  T(F): 98.5 (01 May 2019 05:01), Max: 98.9 (30 Apr 2019 20:00)  HR: 87 (01 May 2019 05:01) (69 - 88)  BP: 122/58 (01 May 2019 05:01) (122/58 - 146/67)  RR: 18 (01 May 2019 05:01) (18 - 19)  SpO2: 90% (01 May 2019 05:01) (90% - 96%)    PE  gen: nad, a&ox3  cv: rrr  resp: nonlabored breathing  gi: soft, nd, nttp  msk: no c/c/e. LLE with dressings in place. able to wiggle toes  vasc: 2+ DP    I&O's Detail    29 Apr 2019 07:01  -  30 Apr 2019 07:00  --------------------------------------------------------  IN:    Oral Fluid: 120 mL  Total IN: 120 mL    OUT:    Stool: 2 mL  Total OUT: 2 mL    Total NET: 118 mL      30 Apr 2019 07:01  -  01 May 2019 06:13  --------------------------------------------------------  IN:    IV PiggyBack: 100 mL    Oral Fluid: 120 mL  Total IN: 220 mL    OUT:  Total OUT: 0 mL    Total NET: 220 mL      LABS:                        7.0    7.3   )-----------( 116      ( 29 Apr 2019 07:44 )             21.6     04-29    138  |  101  |  21.0<H>  ----------------------------<  143<H>  4.6   |  28.0  |  0.75    Ca    7.8<L>      29 Apr 2019 07:44  Phos  2.3     04-29  Mg     2.4     04-29      RADIOLOGY & ADDITIONAL STUDIES: no addl studies      A/P:    89 year old male s/p intramedullary nail of L femur for a comminuted spiral proximal femur fracture who received TXA in OR ([employer]) currently with pain controlled    - Patient was set to be discharged to rehab yesterday, however it was recommended that the patient undergo a series of treatments per Heme/Onc given his anemia and his status as a Jehovah witness (unable to accept blood product) - patient to stay in house for an additional 2-3 days; team to speak with Heme/Onc today about whether the treatment can be completed as an outpt in rehab  - WBAT LLE  - continue aggressive bowel regimen  - non narcotic pain control    - dispo discharge planning  - PM&R recs much appreciated   - Pending placement to acute rehab

## 2019-05-01 NOTE — PROGRESS NOTE ADULT - SUBJECTIVE AND OBJECTIVE BOX
Rehab is not set up to administer Venofer IV, but we would be fine with discharge to rehab today after he receives his second dose of venofer.

## 2019-05-01 NOTE — DISCHARGE NOTE PROVIDER - NSDCFUADDINST_GEN_ALL_CORE_FT
Please call the numbers provided to make follow up appointments within 1-2 weeks from discharge.  Patient may bear weight on left lower extremity as tolerated.

## 2019-05-01 NOTE — DISCHARGE NOTE PROVIDER - CARE PROVIDER_API CALL
Brady Mckeon)  Orthopaedic Surgery  [address] [place] [address]  Phone: [phone]  Fax: [phone]  Follow Up Time:     Jaime Fleming)  Hematology; Internal Medicine; Medical Oncology  [address] [place] [address]  Phone: [phone]  Fax: [phone]  Follow Up Time:

## 2019-05-01 NOTE — DISCHARGE NOTE NURSING/CASE MANAGEMENT/SOCIAL WORK - NSDCVIVACCINE_GEN_ALL_CORE_FT
Influenza , 2017/11/19 09:08 , Heidi Ureña (RN)  Influenza , 2017/11/19 11:15 , Heidi Ureña (BRANDON)

## 2019-05-01 NOTE — PROGRESS NOTE ADULT - SUBJECTIVE AND OBJECTIVE BOX
Patient in bed, eating breakfast.   Transfer delayed due to anemia and need for Hematology consultation.   They recommended Procrit and Venofer prior to DC to assist in improvement.   Patient continues to reports pain in the left hip.   Shows more alertness today with improved movement in the leg, albeit still minimal.     FUNCTIONAL PROGRESS  4/30  Bed Mobility  Bed Mobility Training Sit-to-Supine: maximum assist (25% patient effort);  1 person assist;  verbal cues;  bed rails  Bed Mobility Training Supine-to-Sit: maximum assist (25% patient effort);  1 person assist;  verbal cues;  bed rails  Bed Mobility Training Limitations: decreased ability to use arms for pushing/pulling;  impaired ability to control trunk for mobility;  decreased strength    Sit-Stand Transfer Training  Transfer Training Sit-to-Stand Transfer: maximum assist (25% patient effort);  2 person assist;  verbal cues;  weight-bearing as tolerated   rolling walker  Transfer Training Stand-to-Sit Transfer: maximum assist (25% patient effort);  2 person assist;  weight-bearing as tolerated   rolling walker  Sit-to-Stand Transfer Training Transfer Safety Analysis: decreased sequencing ability;  decreased weight-shifting ability;  decreased strength;  rolling walker    Gait Training  Gait Training: maximum assist (25% patient effort);  2 person assist;  verbal cues;  weight-bearing as tolerated   rolling walker;  2 side steps  Gait Analysis: swing-to gait   crouch;  decreased hip/knee flexion;  shuffling;  decreased step length;  decreased strength;  rolling walker        REVIEW OF SYSTEMS  Constitutional - No fever,  +fatigue  Neurological - +loss of strength   Skin - No rashes, +lesions   Musculoskeletal - +joint pain, +joint swelling, +muscle pain    VITALS  T(C): 36.9 (05-01-19 @ 05:01), Max: 37.2 (04-30-19 @ 20:00)  HR: 87 (05-01-19 @ 05:01) (69 - 88)  BP: 122/58 (05-01-19 @ 05:01) (122/58 - 146/67)  RR: 18 (05-01-19 @ 05:01) (18 - 19)  SpO2: 90% (05-01-19 @ 05:01) (90% - 96%)  Wt(kg): --    MEDICATIONS   acetaminophen   Tablet .. 650 milliGRAM(s) every 6 hours PRN  aluminum hydroxide/magnesium hydroxide/simethicone Suspension 30 milliLiter(s) four times a day PRN  bisacodyl Suppository 10 milliGRAM(s) daily PRN  diphenhydrAMINE 25 milliGRAM(s) every 4 hours PRN  docusate sodium 100 milliGRAM(s) three times a day  enoxaparin Injectable 30 milliGRAM(s) every 12 hours  escitalopram 20 milliGRAM(s) daily  famotidine    Tablet 20 milliGRAM(s) every 12 hours  ferrous    sulfate 325 milliGRAM(s) three times a day with meals  finasteride 5 milliGRAM(s) daily  folic acid 1 milliGRAM(s) daily  iron sucrose IVPB 200 milliGRAM(s) every 24 hours  magnesium citrate Oral Solution 1 Bottle daily  magnesium hydroxide Suspension 30 milliLiter(s) daily PRN  melatonin 5 milliGRAM(s) at bedtime  memantine 10 milliGRAM(s) two times a day  metoprolol tartrate 25 milliGRAM(s) two times a day  modafinil 200 milliGRAM(s) daily  ondansetron Injectable 4 milliGRAM(s) every 6 hours PRN  polyethylene glycol 3350 17 Gram(s) two times a day  tamsulosin 0.4 milliGRAM(s) at bedtime      RECENT LABS - Reviewed                  ----------------------------------------------------------------------------------------  PHYSICAL EXAM  Constitutional - NAD, Comfortable  Extremities - Swelling of the right LE  Neurologic Exam -                    Cognitive - Awake, Alert     Motor - Left shoulder weakness and BLE weakness                    LEFT    UE - ShAB 2/5, EF 4/5, EE 3/5,  5/5                    RIGHT UE - ShAB 4/5, EF 4/5, EE 4/5,  5/5                    LEFT    LE - 2/5                    RIGHT LE - HF 1/5, DF 3/5      Sensory - Intact to LT  Psychiatric - fatigued, More alert and conversive  ----------------------------------------------------------------------------------------  ASSESSMENT/PLAN  89yMale with functional deficits after  a trauma related to a fall sustaining a left femoral fracture  Left femoral fracture s/p IMN - WBAT   Acute blood loss anemia - Folate Iron, Venofer  Pain - Tylenol, Dilaudid, Oxycodone, Consider Lidoderm patches  Memory - Namenda  Sleep Melatonin  Constipation - Dulcolax, MOM, Miralax, Colace  Mood - Lexapro  DVT PPX - SCDs, Lovenox  Rehab - Plan as per family decision is [facility]. Delayed until receives anemic products. Continue bedside therapy as well as OOB throughout the day with mobilization throughout the day with staff to maintain cardiopulmonary function and prevention of secondary complications related to debility.

## 2019-05-01 NOTE — PROGRESS NOTE ADULT - PROVIDER SPECIALTY LIST ADULT
Anesthesia
Heme/Onc
Heme/Onc
Orthopedics
Rehab Medicine
Rehab Medicine
Surgery
Trauma Surgery
Orthopedics

## 2019-05-01 NOTE — DISCHARGE NOTE PROVIDER - CARE PROVIDERS DIRECT ADDRESSES
[email or web address]

## 2019-05-01 NOTE — PROGRESS NOTE ADULT - SUBJECTIVE AND OBJECTIVE BOX
Pt Name: PRASHANT MORALES    MRN: [id number]      Patient is a 89 yo M with a femoral shaft fracture. Patient s/p intramedullary nail fixation POD#4. Patient was seen and evaluated at bedside this AM. Patient is doing well. Pain is well controlled. No other orthopedic complaints at this time. Patient reports that he is participating in PT but can not recall if he was out of bed or not. Patient denies fever, chills, chest pain, SOB, abdominal pain, calf pain, numbness, or any acute motor or sensory changes. Nurse at bedside reports changing dressings this morning. Reports that incisions are clean and intact. Minimal blood spotting on dressing.       PAST MEDICAL & SURGICAL HISTORY:  Macular degeneration  UTI (urinary tract infection): 2013  Narcolepsy  Aortic stenosis  GI bleed  HLD (hyperlipidemia)  Depression  Anemia  Endocarditis: 2013  BPH (benign prostatic hyperplasia)  Fatigue  S/P TAVR (transcatheter aortic valve replacement)  S/P cataract extraction and insertion of intraocular lens: bilat      Allergies: No Known Allergies      Medications: acetaminophen   Tablet .. 650 milliGRAM(s) Oral every 6 hours PRN  aluminum hydroxide/magnesium hydroxide/simethicone Suspension 30 milliLiter(s) Oral four times a day PRN  bisacodyl Suppository 10 milliGRAM(s) Rectal daily PRN  ceFAZolin   IVPB 2000 milliGRAM(s) IV Intermittent once  diphenhydrAMINE 25 milliGRAM(s) Oral every 4 hours PRN  docusate sodium 100 milliGRAM(s) Oral three times a day  enoxaparin Injectable 30 milliGRAM(s) SubCutaneous every 12 hours  escitalopram 20 milliGRAM(s) Oral daily  famotidine    Tablet 20 milliGRAM(s) Oral every 12 hours  ferrous    sulfate 325 milliGRAM(s) Oral three times a day with meals  finasteride 5 milliGRAM(s) Oral daily  folic acid 1 milliGRAM(s) Oral daily  HYDROmorphone  Injectable 0.5 milliGRAM(s) IV Push every 3 hours PRN  magnesium citrate Oral Solution 1 Bottle Oral daily  magnesium hydroxide Suspension 30 milliLiter(s) Oral daily PRN  melatonin 5 milliGRAM(s) Oral at bedtime  memantine 10 milliGRAM(s) Oral two times a day  metoprolol tartrate 25 milliGRAM(s) Oral two times a day  modafinil 200 milliGRAM(s) Oral daily  ondansetron Injectable 4 milliGRAM(s) IV Push every 6 hours PRN  oxyCODONE    IR 10 milliGRAM(s) Oral every 4 hours PRN  oxyCODONE    IR 5 milliGRAM(s) Oral every 4 hours PRN  polyethylene glycol 3350 17 Gram(s) Oral two times a day  tamsulosin 0.4 milliGRAM(s) Oral at bedtime                          7.0    7.3   )-----------( 116      ( 29 Apr 2019 07:44 )             21.6     04-29    138  |  101  |  21.0<H>  ----------------------------<  143<H>  4.6   |  28.0  |  0.75    Ca    7.8<L>      29 Apr 2019 07:44  Phos  2.3     04-29  Mg     2.4     04-29        PHYSICAL EXAM:    Vital Signs Last 24 Hrs  T(C): 36.7 (30 Apr 2019 05:42), Max: 36.7 (29 Apr 2019 16:50)  T(F): 98.1 (30 Apr 2019 05:42), Max: 98.1 (29 Apr 2019 16:50)  HR: 84 (30 Apr 2019 05:42) (60 - 85)  BP: 142/60 (30 Apr 2019 05:42) (125/51 - 153/63)  BP(mean): --  RR: 20 (30 Apr 2019 05:42) (18 - 20)  SpO2: 95% (30 Apr 2019 05:42) (91% - 95%)  Daily     Daily     Appearance: Alert, responsive, in no acute distress.    Skin: no rash on visible skin. Skin is clean, dry and intact.   Musculoskeletal:         Left Lower Extremity: Calf supple and nontender. Skin warm and intact. No visible erythema or induration. Dressing is clean, dry, and intact and dated with 5/1.  Thigh compartments soft and compressible. Hip ROM not assessed at this time. Sensation intact distally. Dorsi/plantarflexion intact. /EHL/FHL intact. Pulses felt distally.         Right Lower Extremity: Calf supple and nontender.       A/P:  Pt is a 89y Male with POD #3 s/p L femur IMN.    PLAN:   -Pain control.  -DVT PPx as prescribed  -PT/OT  -WBAT.  -Incentive lilian encouraged  - heme.onc following h/h  -Remaining management as per primary team.   -Patient cleared for d/c from orthopedic standpoint. Pt Name: PRASHANT MORALES    MRN: [id number]      Patient is a 89 yo M with a femoral shaft fracture. Patient s/p intramedullary nail fixation POD#4. Patient was seen and evaluated at bedside this AM. Patient is doing well. Pain is well controlled. No other orthopedic complaints at this time. Patient reports that he is participating in PT but can not recall if he was out of bed or not. Patient denies fever, chills, chest pain, SOB, abdominal pain, calf pain, numbness, or any acute motor or sensory changes. Nurse at bedside reports changing dressings this morning. Reports that incisions are clean and intact. Minimal blood spotting on dressing.       PAST MEDICAL & SURGICAL HISTORY:  Macular degeneration  UTI (urinary tract infection): 2013  Narcolepsy  Aortic stenosis  GI bleed  HLD (hyperlipidemia)  Depression  Anemia  Endocarditis: 2013  BPH (benign prostatic hyperplasia)  Fatigue  S/P TAVR (transcatheter aortic valve replacement)  S/P cataract extraction and insertion of intraocular lens: bilat      Allergies: No Known Allergies      Medications: acetaminophen   Tablet .. 650 milliGRAM(s) Oral every 6 hours PRN  aluminum hydroxide/magnesium hydroxide/simethicone Suspension 30 milliLiter(s) Oral four times a day PRN  bisacodyl Suppository 10 milliGRAM(s) Rectal daily PRN  ceFAZolin   IVPB 2000 milliGRAM(s) IV Intermittent once  diphenhydrAMINE 25 milliGRAM(s) Oral every 4 hours PRN  docusate sodium 100 milliGRAM(s) Oral three times a day  enoxaparin Injectable 30 milliGRAM(s) SubCutaneous every 12 hours  escitalopram 20 milliGRAM(s) Oral daily  famotidine    Tablet 20 milliGRAM(s) Oral every 12 hours  ferrous    sulfate 325 milliGRAM(s) Oral three times a day with meals  finasteride 5 milliGRAM(s) Oral daily  folic acid 1 milliGRAM(s) Oral daily  HYDROmorphone  Injectable 0.5 milliGRAM(s) IV Push every 3 hours PRN  magnesium citrate Oral Solution 1 Bottle Oral daily  magnesium hydroxide Suspension 30 milliLiter(s) Oral daily PRN  melatonin 5 milliGRAM(s) Oral at bedtime  memantine 10 milliGRAM(s) Oral two times a day  metoprolol tartrate 25 milliGRAM(s) Oral two times a day  modafinil 200 milliGRAM(s) Oral daily  ondansetron Injectable 4 milliGRAM(s) IV Push every 6 hours PRN  oxyCODONE    IR 10 milliGRAM(s) Oral every 4 hours PRN  oxyCODONE    IR 5 milliGRAM(s) Oral every 4 hours PRN  polyethylene glycol 3350 17 Gram(s) Oral two times a day  tamsulosin 0.4 milliGRAM(s) Oral at bedtime                          7.0    7.3   )-----------( 116      ( 29 Apr 2019 07:44 )             21.6     04-29    138  |  101  |  21.0<H>  ----------------------------<  143<H>  4.6   |  28.0  |  0.75    Ca    7.8<L>      29 Apr 2019 07:44  Phos  2.3     04-29  Mg     2.4     04-29        PHYSICAL EXAM:    Vital Signs Last 24 Hrs  T(C): 36.7 (30 Apr 2019 05:42), Max: 36.7 (29 Apr 2019 16:50)  T(F): 98.1 (30 Apr 2019 05:42), Max: 98.1 (29 Apr 2019 16:50)  HR: 84 (30 Apr 2019 05:42) (60 - 85)  BP: 142/60 (30 Apr 2019 05:42) (125/51 - 153/63)  BP(mean): --  RR: 20 (30 Apr 2019 05:42) (18 - 20)  SpO2: 95% (30 Apr 2019 05:42) (91% - 95%)  Daily     Daily     Appearance: Alert, responsive, in no acute distress.    Skin: no rash on visible skin. Skin is clean, dry and intact.   Musculoskeletal:         Left Lower Extremity: Calf supple and nontender. Skin warm and intact. No visible erythema or induration. Dressing is clean, dry, and intact and dated with 5/1.  Thigh compartments soft and compressible. Hip ROM not assessed at this time. Sensation intact distally. Dorsi/plantarflexion intact. /EHL/FHL intact. warm and perfused.        Right Lower Extremity: Calf supple and nontender.       A/P:  Pt is a 89y Male with POD #3 s/p L femur IMN.    PLAN:   -Pain control.  -DVT PPx as prescribed  -PT/OT  -WBAT.  -Incentive lilian encouraged  - heme.onc following h/h  -Remaining management as per primary team.   -Patient cleared for d/c from orthopedic standpoint. Pt Name: PRASHANT MORALES    MRN: [id number]      Patient is a 89 yo M with a femoral shaft fracture. Patient s/p intramedullary nail fixation POD#4. Patient was seen and evaluated at bedside this AM. Patient is doing well. Pain is well controlled. No other orthopedic complaints at this time. Patient reports that he is participating in PT but can not recall if he was out of bed or not. Patient denies fever, chills, chest pain, SOB, abdominal pain, calf pain, numbness, or any acute motor or sensory changes. Nurse at bedside reports changing dressings this morning. Reports that incisions are clean and intact. Minimal blood spotting on dressing.       PAST MEDICAL & SURGICAL HISTORY:  Macular degeneration  UTI (urinary tract infection): 2013  Narcolepsy  Aortic stenosis  GI bleed  HLD (hyperlipidemia)  Depression  Anemia  Endocarditis: 2013  BPH (benign prostatic hyperplasia)  Fatigue  S/P TAVR (transcatheter aortic valve replacement)  S/P cataract extraction and insertion of intraocular lens: bilat      Allergies: No Known Allergies      Medications: acetaminophen   Tablet .. 650 milliGRAM(s) Oral every 6 hours PRN  aluminum hydroxide/magnesium hydroxide/simethicone Suspension 30 milliLiter(s) Oral four times a day PRN  bisacodyl Suppository 10 milliGRAM(s) Rectal daily PRN  ceFAZolin   IVPB 2000 milliGRAM(s) IV Intermittent once  diphenhydrAMINE 25 milliGRAM(s) Oral every 4 hours PRN  docusate sodium 100 milliGRAM(s) Oral three times a day  enoxaparin Injectable 30 milliGRAM(s) SubCutaneous every 12 hours  escitalopram 20 milliGRAM(s) Oral daily  famotidine    Tablet 20 milliGRAM(s) Oral every 12 hours  ferrous    sulfate 325 milliGRAM(s) Oral three times a day with meals  finasteride 5 milliGRAM(s) Oral daily  folic acid 1 milliGRAM(s) Oral daily  HYDROmorphone  Injectable 0.5 milliGRAM(s) IV Push every 3 hours PRN  magnesium citrate Oral Solution 1 Bottle Oral daily  magnesium hydroxide Suspension 30 milliLiter(s) Oral daily PRN  melatonin 5 milliGRAM(s) Oral at bedtime  memantine 10 milliGRAM(s) Oral two times a day  metoprolol tartrate 25 milliGRAM(s) Oral two times a day  modafinil 200 milliGRAM(s) Oral daily  ondansetron Injectable 4 milliGRAM(s) IV Push every 6 hours PRN  oxyCODONE    IR 10 milliGRAM(s) Oral every 4 hours PRN  oxyCODONE    IR 5 milliGRAM(s) Oral every 4 hours PRN  polyethylene glycol 3350 17 Gram(s) Oral two times a day  tamsulosin 0.4 milliGRAM(s) Oral at bedtime                          7.0    7.3   )-----------( 116      ( 29 Apr 2019 07:44 )             21.6     04-29    138  |  101  |  21.0<H>  ----------------------------<  143<H>  4.6   |  28.0  |  0.75    Ca    7.8<L>      29 Apr 2019 07:44  Phos  2.3     04-29  Mg     2.4     04-29        PHYSICAL EXAM:    Vital Signs Last 24 Hrs  T(C): 36.7 (30 Apr 2019 05:42), Max: 36.7 (29 Apr 2019 16:50)  T(F): 98.1 (30 Apr 2019 05:42), Max: 98.1 (29 Apr 2019 16:50)  HR: 84 (30 Apr 2019 05:42) (60 - 85)  BP: 142/60 (30 Apr 2019 05:42) (125/51 - 153/63)  BP(mean): --  RR: 20 (30 Apr 2019 05:42) (18 - 20)  SpO2: 95% (30 Apr 2019 05:42) (91% - 95%)  Daily     Daily     Appearance: Alert, responsive, in no acute distress.    Skin: no rash on visible skin. Skin is clean, dry and intact.   Musculoskeletal:         Left Lower Extremity: Calf supple and nontender. Skin warm and intact. No visible erythema or induration. Dressing is clean, dry, and intact and dated with 5/1.  Thigh compartments soft and compressible. Hip ROM not assessed at this time. Sensation intact distally. Dorsi/plantarflexion intact. /EHL/FHL intact. warm and perfused.        Right Lower Extremity: Calf supple and nontender.       A/P:  Pt is a 89y Male with POD #3 s/p L femur IMN.    PLAN:   -Pain control.  -DVT PPx as prescribed  -PT/OT  -WBAT.  -Incentive lilian encouraged  - heme.onc following h/h  - c/w management per primary team.

## 2019-05-01 NOTE — DISCHARGE NOTE NURSING/CASE MANAGEMENT/SOCIAL WORK - NSDCDPATPORTLINK_GEN_ALL_CORE
You can access the [facility] Portal, offered by [facility], by registering with the following website: [email or web address]

## 2019-05-09 NOTE — CDI QUERY NOTE - NSCDIOTHERTXTBX_GEN_ALL_CORE_HH
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  Discussed options with patient.  Patient opted for closed reduction. As per ER ATTG Michael however due to patients low RR and O2 saturation will need to wait until improved to attempt reduction  Dr Rodriguez will call Ortho when ready for conscious sedation and closed reduction attempt  Dr Mckeon aware and agrees with plan.        Please clarify, in addendum to dc summary:     - R hip arthroplasty dislocation not present on this patient   - Pt had L femoral shaft fracture and R hip arthroplasty dislocation   - other          Thank you,

## 2019-05-23 ENCOUNTER — APPOINTMENT (OUTPATIENT)
Dept: ORTHOPEDIC SURGERY | Facility: CLINIC | Age: 84
End: 2019-05-23
Payer: MEDICARE

## 2019-05-23 PROCEDURE — 99024 POSTOP FOLLOW-UP VISIT: CPT

## 2019-05-23 NOTE — ED PROVIDER NOTE - CPE EDP HEAD NORM PED
Would wait a few days and take another test.  Avoid alcohol.  If positive would call to schedule with ob gyn to also discuss medications that she is on.    comprehensive exam...

## 2019-05-23 NOTE — HISTORY OF PRESENT ILLNESS
[Clean/Dry/Intact] : clean, dry and intact [Neuro Intact] : an unremarkable neurological exam [Vascular Intact] : ~T peripheral vascular exam normal [Doing Well] : is doing well [No Sign of Infection] : is showing no signs of infection [Adequate Pain Control] : has adequate pain control [Erythema] : not erythematous [Discharge] : absent of discharge [Swelling] : not swollen [Dehiscence] : not dehisced [de-identified] : s/p intramedullary nail left femur fracture. 4/27/19 [de-identified] : 89 yo m s/p intramedullary nail left femur fracture. 4/27/19 here for pop visit.  patient is accompanied by his family  member.  He is cuurently at [facility]  and is receiving physical therapy.  He is WBAT with walker. Patient denies pain at this time.   [de-identified] : staples removed and steri strips applied [de-identified] : no new imaging today.\par Xrays brought in that were done at outside facility show hardware in place. [de-identified] : Patient will continue with PT WBAT, ROM, strengthening, modalities.  He will return in 1 month for eval and xrays left femur.

## 2019-06-24 ENCOUNTER — APPOINTMENT (OUTPATIENT)
Dept: ORTHOPEDIC SURGERY | Facility: CLINIC | Age: 84
End: 2019-06-24
Payer: MEDICARE

## 2019-06-24 VITALS
WEIGHT: 149 LBS | DIASTOLIC BLOOD PRESSURE: 59 MMHG | HEART RATE: 74 BPM | BODY MASS INDEX: 23.95 KG/M2 | HEIGHT: 66 IN | SYSTOLIC BLOOD PRESSURE: 146 MMHG

## 2019-06-24 PROCEDURE — 99024 POSTOP FOLLOW-UP VISIT: CPT

## 2019-06-24 PROCEDURE — 73552 X-RAY EXAM OF FEMUR 2/>: CPT | Mod: LT

## 2019-07-24 ENCOUNTER — INPATIENT (INPATIENT)
Facility: HOSPITAL | Age: 84
LOS: 6 days | Discharge: HOME | DRG: 871 | End: 2019-07-31
Attending: HOSPITALIST | Admitting: INTERNAL MEDICINE
Payer: MEDICARE

## 2019-07-24 VITALS — WEIGHT: 141.98 LBS

## 2019-07-24 DIAGNOSIS — Z98.49 CATARACT EXTRACTION STATUS, UNSPECIFIED EYE: Chronic | ICD-10-CM

## 2019-07-24 DIAGNOSIS — I35.0 NONRHEUMATIC AORTIC (VALVE) STENOSIS: ICD-10-CM

## 2019-07-24 DIAGNOSIS — J18.9 PNEUMONIA, UNSPECIFIED ORGANISM: ICD-10-CM

## 2019-07-24 DIAGNOSIS — Z95.2 PRESENCE OF PROSTHETIC HEART VALVE: Chronic | ICD-10-CM

## 2019-07-24 DIAGNOSIS — A41.9 SEPSIS, UNSPECIFIED ORGANISM: ICD-10-CM

## 2019-07-24 DIAGNOSIS — I48.92 UNSPECIFIED ATRIAL FLUTTER: ICD-10-CM

## 2019-07-24 LAB
ALBUMIN SERPL ELPH-MCNC: 3.5 G/DL — SIGNIFICANT CHANGE UP (ref 3.3–5.2)
ALP SERPL-CCNC: 77 U/L — SIGNIFICANT CHANGE UP (ref 40–120)
ALT FLD-CCNC: 15 U/L — SIGNIFICANT CHANGE UP
ANION GAP SERPL CALC-SCNC: 13 MMOL/L — SIGNIFICANT CHANGE UP (ref 5–17)
ANISOCYTOSIS BLD QL: SLIGHT — SIGNIFICANT CHANGE UP
APTT BLD: 33.8 SEC — SIGNIFICANT CHANGE UP (ref 27.5–36.3)
AST SERPL-CCNC: 31 U/L — SIGNIFICANT CHANGE UP
BASOPHILS # BLD AUTO: 0 K/UL — SIGNIFICANT CHANGE UP (ref 0–0.2)
BASOPHILS NFR BLD AUTO: 0 % — SIGNIFICANT CHANGE UP (ref 0–2)
BILIRUB SERPL-MCNC: 0.3 MG/DL — LOW (ref 0.4–2)
BUN SERPL-MCNC: 47 MG/DL — HIGH (ref 8–20)
CALCIUM SERPL-MCNC: 9 MG/DL — SIGNIFICANT CHANGE UP (ref 8.6–10.2)
CHLORIDE SERPL-SCNC: 100 MMOL/L — SIGNIFICANT CHANGE UP (ref 98–107)
CO2 SERPL-SCNC: 26 MMOL/L — SIGNIFICANT CHANGE UP (ref 22–29)
CREAT SERPL-MCNC: 1.27 MG/DL — SIGNIFICANT CHANGE UP (ref 0.5–1.3)
EOSINOPHIL # BLD AUTO: 0 K/UL — SIGNIFICANT CHANGE UP (ref 0–0.5)
EOSINOPHIL NFR BLD AUTO: 0 % — SIGNIFICANT CHANGE UP (ref 0–6)
GAS PNL BLDA: SIGNIFICANT CHANGE UP
GAS PNL BLDA: SIGNIFICANT CHANGE UP
GIANT PLATELETS BLD QL SMEAR: PRESENT — SIGNIFICANT CHANGE UP
GLUCOSE SERPL-MCNC: 145 MG/DL — HIGH (ref 70–115)
HCT VFR BLD CALC: 37.6 % — LOW (ref 39–50)
HGB BLD-MCNC: 12.3 G/DL — LOW (ref 13–17)
HPIV3 RNA SPEC QL NAA+PROBE: DETECTED
INR BLD: 1.1 RATIO — SIGNIFICANT CHANGE UP (ref 0.88–1.16)
LACTATE BLDV-MCNC: 1.5 MMOL/L — SIGNIFICANT CHANGE UP (ref 0.5–2)
LYMPHOCYTES # BLD AUTO: 1.08 K/UL — SIGNIFICANT CHANGE UP (ref 1–3.3)
LYMPHOCYTES # BLD AUTO: 16.7 % — SIGNIFICANT CHANGE UP (ref 13–44)
MACROCYTES BLD QL: SLIGHT — SIGNIFICANT CHANGE UP
MANUAL SMEAR VERIFICATION: SIGNIFICANT CHANGE UP
MCHC RBC-ENTMCNC: 29.4 PG — SIGNIFICANT CHANGE UP (ref 27–34)
MCHC RBC-ENTMCNC: 32.7 GM/DL — SIGNIFICANT CHANGE UP (ref 32–36)
MCV RBC AUTO: 89.7 FL — SIGNIFICANT CHANGE UP (ref 80–100)
MICROCYTES BLD QL: SLIGHT — SIGNIFICANT CHANGE UP
MONOCYTES # BLD AUTO: 0.23 K/UL — SIGNIFICANT CHANGE UP (ref 0–0.9)
MONOCYTES NFR BLD AUTO: 3.5 % — SIGNIFICANT CHANGE UP (ref 2–14)
NEUTROPHILS # BLD AUTO: 5.03 K/UL — SIGNIFICANT CHANGE UP (ref 1.8–7.4)
NEUTROPHILS NFR BLD AUTO: 77.2 % — HIGH (ref 43–77)
NEUTS BAND # BLD: 0.9 % — SIGNIFICANT CHANGE UP (ref 0–8)
PLAT MORPH BLD: ABNORMAL
PLATELET # BLD AUTO: 197 K/UL — SIGNIFICANT CHANGE UP (ref 150–400)
PLATELET COUNT - ESTIMATE: NORMAL — SIGNIFICANT CHANGE UP
POIKILOCYTOSIS BLD QL AUTO: SLIGHT — SIGNIFICANT CHANGE UP
POTASSIUM SERPL-MCNC: 2.8 MMOL/L — CRITICAL LOW (ref 3.5–5.3)
POTASSIUM SERPL-SCNC: 2.8 MMOL/L — CRITICAL LOW (ref 3.5–5.3)
PROT SERPL-MCNC: 7.1 G/DL — SIGNIFICANT CHANGE UP (ref 6.6–8.7)
PROTHROM AB SERPL-ACNC: 12.7 SEC — SIGNIFICANT CHANGE UP (ref 10–12.9)
RAPID RVP RESULT: DETECTED
RBC # BLD: 4.19 M/UL — LOW (ref 4.2–5.8)
RBC # FLD: 14.8 % — HIGH (ref 10.3–14.5)
RBC BLD AUTO: ABNORMAL
SODIUM SERPL-SCNC: 139 MMOL/L — SIGNIFICANT CHANGE UP (ref 135–145)
VARIANT LYMPHS # BLD: 1.7 % — SIGNIFICANT CHANGE UP (ref 0–6)
WBC # BLD: 6.44 K/UL — SIGNIFICANT CHANGE UP (ref 3.8–10.5)
WBC # FLD AUTO: 6.44 K/UL — SIGNIFICANT CHANGE UP (ref 3.8–10.5)

## 2019-07-24 PROCEDURE — 99223 1ST HOSP IP/OBS HIGH 75: CPT

## 2019-07-24 PROCEDURE — 71045 X-RAY EXAM CHEST 1 VIEW: CPT | Mod: 26

## 2019-07-24 PROCEDURE — 99291 CRITICAL CARE FIRST HOUR: CPT

## 2019-07-24 PROCEDURE — 99223 1ST HOSP IP/OBS HIGH 75: CPT | Mod: AI

## 2019-07-24 RX ORDER — CEFEPIME 1 G/1
INJECTION, POWDER, FOR SOLUTION INTRAMUSCULAR; INTRAVENOUS
Refills: 0 | Status: DISCONTINUED | OUTPATIENT
Start: 2019-07-24 | End: 2019-07-24

## 2019-07-24 RX ORDER — VANCOMYCIN HCL 1 G
1000 VIAL (EA) INTRAVENOUS ONCE
Refills: 0 | Status: COMPLETED | OUTPATIENT
Start: 2019-07-24 | End: 2019-07-24

## 2019-07-24 RX ORDER — DOCUSATE SODIUM 100 MG
100 CAPSULE ORAL THREE TIMES A DAY
Refills: 0 | Status: DISCONTINUED | OUTPATIENT
Start: 2019-07-24 | End: 2019-07-31

## 2019-07-24 RX ORDER — CEFEPIME 1 G/1
1000 INJECTION, POWDER, FOR SOLUTION INTRAMUSCULAR; INTRAVENOUS ONCE
Refills: 0 | Status: DISCONTINUED | OUTPATIENT
Start: 2019-07-24 | End: 2019-07-24

## 2019-07-24 RX ORDER — DILTIAZEM HCL 120 MG
10 CAPSULE, EXT RELEASE 24 HR ORAL ONCE
Refills: 0 | Status: COMPLETED | OUTPATIENT
Start: 2019-07-24 | End: 2019-07-24

## 2019-07-24 RX ORDER — KETOROLAC TROMETHAMINE 30 MG/ML
15 SYRINGE (ML) INJECTION ONCE
Refills: 0 | Status: DISCONTINUED | OUTPATIENT
Start: 2019-07-24 | End: 2019-07-24

## 2019-07-24 RX ORDER — ACETAMINOPHEN 500 MG
650 TABLET ORAL EVERY 6 HOURS
Refills: 0 | Status: DISCONTINUED | OUTPATIENT
Start: 2019-07-24 | End: 2019-07-31

## 2019-07-24 RX ORDER — TAMSULOSIN HYDROCHLORIDE 0.4 MG/1
0.4 CAPSULE ORAL AT BEDTIME
Refills: 0 | Status: DISCONTINUED | OUTPATIENT
Start: 2019-07-24 | End: 2019-07-25

## 2019-07-24 RX ORDER — ESCITALOPRAM OXALATE 10 MG/1
20 TABLET, FILM COATED ORAL DAILY
Refills: 0 | Status: DISCONTINUED | OUTPATIENT
Start: 2019-07-24 | End: 2019-07-31

## 2019-07-24 RX ORDER — VANCOMYCIN HCL 1 G
1000 VIAL (EA) INTRAVENOUS EVERY 12 HOURS
Refills: 0 | Status: DISCONTINUED | OUTPATIENT
Start: 2019-07-24 | End: 2019-07-24

## 2019-07-24 RX ORDER — DILTIAZEM HCL 120 MG
30 CAPSULE, EXT RELEASE 24 HR ORAL ONCE
Refills: 0 | Status: COMPLETED | OUTPATIENT
Start: 2019-07-24 | End: 2019-07-24

## 2019-07-24 RX ORDER — METOPROLOL TARTRATE 50 MG
25 TABLET ORAL
Refills: 0 | Status: DISCONTINUED | OUTPATIENT
Start: 2019-07-24 | End: 2019-07-31

## 2019-07-24 RX ORDER — ASPIRIN/CALCIUM CARB/MAGNESIUM 324 MG
81 TABLET ORAL DAILY
Refills: 0 | Status: DISCONTINUED | OUTPATIENT
Start: 2019-07-24 | End: 2019-07-31

## 2019-07-24 RX ORDER — POTASSIUM CHLORIDE 20 MEQ
10 PACKET (EA) ORAL
Refills: 0 | Status: COMPLETED | OUTPATIENT
Start: 2019-07-24 | End: 2019-07-24

## 2019-07-24 RX ORDER — SODIUM CHLORIDE 9 MG/ML
2000 INJECTION, SOLUTION INTRAVENOUS ONCE
Refills: 0 | Status: COMPLETED | OUTPATIENT
Start: 2019-07-24 | End: 2019-07-24

## 2019-07-24 RX ORDER — PIPERACILLIN AND TAZOBACTAM 4; .5 G/20ML; G/20ML
3.38 INJECTION, POWDER, LYOPHILIZED, FOR SOLUTION INTRAVENOUS ONCE
Refills: 0 | Status: COMPLETED | OUTPATIENT
Start: 2019-07-24 | End: 2019-07-24

## 2019-07-24 RX ORDER — LINEZOLID 600 MG/300ML
600 INJECTION, SOLUTION INTRAVENOUS EVERY 12 HOURS
Refills: 0 | Status: DISCONTINUED | OUTPATIENT
Start: 2019-07-24 | End: 2019-07-26

## 2019-07-24 RX ORDER — ACETAMINOPHEN 500 MG
975 TABLET ORAL ONCE
Refills: 0 | Status: DISCONTINUED | OUTPATIENT
Start: 2019-07-24 | End: 2019-07-24

## 2019-07-24 RX ORDER — POTASSIUM CHLORIDE 20 MEQ
10 PACKET (EA) ORAL ONCE
Refills: 0 | Status: COMPLETED | OUTPATIENT
Start: 2019-07-24 | End: 2019-07-24

## 2019-07-24 RX ORDER — MEMANTINE HYDROCHLORIDE 10 MG/1
10 TABLET ORAL DAILY
Refills: 0 | Status: DISCONTINUED | OUTPATIENT
Start: 2019-07-24 | End: 2019-07-31

## 2019-07-24 RX ORDER — IBUPROFEN 200 MG
600 TABLET ORAL ONCE
Refills: 0 | Status: DISCONTINUED | OUTPATIENT
Start: 2019-07-24 | End: 2019-07-24

## 2019-07-24 RX ORDER — DEXTROSE MONOHYDRATE, SODIUM CHLORIDE, AND POTASSIUM CHLORIDE 50; .745; 4.5 G/1000ML; G/1000ML; G/1000ML
1000 INJECTION, SOLUTION INTRAVENOUS
Refills: 0 | Status: DISCONTINUED | OUTPATIENT
Start: 2019-07-24 | End: 2019-07-25

## 2019-07-24 RX ORDER — APIXABAN 2.5 MG/1
2.5 TABLET, FILM COATED ORAL EVERY 12 HOURS
Refills: 0 | Status: DISCONTINUED | OUTPATIENT
Start: 2019-07-24 | End: 2019-07-27

## 2019-07-24 RX ORDER — ERTAPENEM SODIUM 1 G/1
1000 INJECTION, POWDER, LYOPHILIZED, FOR SOLUTION INTRAMUSCULAR; INTRAVENOUS EVERY 24 HOURS
Refills: 0 | Status: COMPLETED | OUTPATIENT
Start: 2019-07-24 | End: 2019-07-30

## 2019-07-24 RX ADMIN — DEXTROSE MONOHYDRATE, SODIUM CHLORIDE, AND POTASSIUM CHLORIDE 75 MILLILITER(S): 50; .745; 4.5 INJECTION, SOLUTION INTRAVENOUS at 21:22

## 2019-07-24 RX ADMIN — Medication 10 MILLIGRAM(S): at 09:01

## 2019-07-24 RX ADMIN — Medication 100 MILLIEQUIVALENT(S): at 19:58

## 2019-07-24 RX ADMIN — Medication 15 MILLIGRAM(S): at 10:41

## 2019-07-24 RX ADMIN — Medication 250 MILLIGRAM(S): at 09:50

## 2019-07-24 RX ADMIN — Medication 25 MILLIGRAM(S): at 18:09

## 2019-07-24 RX ADMIN — Medication 100 MILLIEQUIVALENT(S): at 11:02

## 2019-07-24 RX ADMIN — Medication 15 MILLIGRAM(S): at 09:23

## 2019-07-24 RX ADMIN — SODIUM CHLORIDE 2000 MILLILITER(S): 9 INJECTION, SOLUTION INTRAVENOUS at 10:41

## 2019-07-24 RX ADMIN — TAMSULOSIN HYDROCHLORIDE 0.4 MILLIGRAM(S): 0.4 CAPSULE ORAL at 21:22

## 2019-07-24 RX ADMIN — Medication 100 MILLIGRAM(S): at 15:20

## 2019-07-24 RX ADMIN — PIPERACILLIN AND TAZOBACTAM 3.38 GRAM(S): 4; .5 INJECTION, POWDER, LYOPHILIZED, FOR SOLUTION INTRAVENOUS at 09:53

## 2019-07-24 RX ADMIN — Medication 25 MILLIGRAM(S): at 11:50

## 2019-07-24 RX ADMIN — PIPERACILLIN AND TAZOBACTAM 200 GRAM(S): 4; .5 INJECTION, POWDER, LYOPHILIZED, FOR SOLUTION INTRAVENOUS at 09:23

## 2019-07-24 RX ADMIN — SODIUM CHLORIDE 2000 MILLILITER(S): 9 INJECTION, SOLUTION INTRAVENOUS at 09:23

## 2019-07-24 RX ADMIN — ERTAPENEM SODIUM 120 MILLIGRAM(S): 1 INJECTION, POWDER, LYOPHILIZED, FOR SOLUTION INTRAMUSCULAR; INTRAVENOUS at 16:12

## 2019-07-24 RX ADMIN — Medication 100 MILLIGRAM(S): at 21:22

## 2019-07-24 RX ADMIN — LINEZOLID 300 MILLIGRAM(S): 600 INJECTION, SOLUTION INTRAVENOUS at 17:23

## 2019-07-24 RX ADMIN — APIXABAN 2.5 MILLIGRAM(S): 2.5 TABLET, FILM COATED ORAL at 18:09

## 2019-07-24 RX ADMIN — Medication 30 MILLIGRAM(S): at 09:15

## 2019-07-24 RX ADMIN — MEMANTINE HYDROCHLORIDE 10 MILLIGRAM(S): 10 TABLET ORAL at 21:22

## 2019-07-24 RX ADMIN — Medication 100 MILLIEQUIVALENT(S): at 21:22

## 2019-07-24 NOTE — ED PROVIDER NOTE - OBJECTIVE STATEMENT
89 year old male with PMH AS s/p TAVR, HLD, anemia, endocarditis in 2013 presents with rapid heart rate. Pt is currently residing in [facility] secondary to femur fracture, developed a productive cough, and was being treated via antibiotics for "bronchitis". Pt was found this morning to be lethargic, have a pulse ox 88% and to have a rapid HR of 130. Pt states he "feels tired" but he denies chest pain, abd pain, SOB,

## 2019-07-24 NOTE — ED PROVIDER NOTE - CARE PLAN
Principal Discharge DX:	HCAP (healthcare-associated pneumonia)  Secondary Diagnosis:	Atrial flutter with rapid ventricular response

## 2019-07-24 NOTE — H&P ADULT - NSICDXPASTSURGICALHX_GEN_ALL_CORE_FT
PAST SURGICAL HISTORY:  S/P cataract extraction and insertion of intraocular lens bilat    S/P TAVR (transcatheter aortic valve replacement)

## 2019-07-24 NOTE — ED ADULT TRIAGE NOTE - CHIEF COMPLAINT QUOTE
Patient sent from [facility], as per EMS patient was sent to ED for tachycardia, no hx of a-flutter, patient has hx of pneumonia, patient only complaint at this time is feeling tired. MD Cervantes at bedside

## 2019-07-24 NOTE — ED ADULT NURSE REASSESSMENT NOTE - NS ED NURSE REASSESS COMMENT FT1
Call placed to Dr. Kurtz regarding patient's oxygenation. Patient O2 sat 88% on 4L NC. Patient suctioned with Yankauer and placed on 50% Ventimask with improvement of sat to only 90%. Mottling of bilateral; lower extremities noted. Dr. Kurtz notified of abovementioned. STAT Duoneb given with improvement of sat to 95% noted. Dr. Kurtz to come evaluate patient, will also put a call out to patient's son. Monitoring ongoing. Call placed to Dr. Kurtz regarding patient's oxygenation. Patient O2 sat 88% on 4L NC. Patient suctioned with Yankauer and placed on 50% Ventimask with improvement of sat to only 90%. Mottling of bilateral lower extremities noted. Dr. Kurtz notified of abovementioned. STAT Duoneb given with improvement of sat to 95% noted. Dr. Kurtz to come evaluate patient, will also put a call out to patient's son. Monitoring ongoing.

## 2019-07-24 NOTE — H&P ADULT - HISTORY OF PRESENT ILLNESS
89y Male PMH     FAMILY HISTORY: reviewed and negative.  SOCIAL HISTORY: - alcohol, - IVDA, - smoking.  REVIEW OF SYSTEMS: General: - fatigue, - weight loss, - fevers, - chills.  HEENT: - headache, - hearing disturbances.  EYES: - visual disturbances, - diplopia.  CARDIOVASCULAR: - chest pain, - palpitations.  PULMONARY: - SOB, - cough, - hemoptysis.  GI: - abdominal pain, - nausea, - vomiting, - diarrhea, - constipation.  : - urinary urgency, - frequency, - dysuria.  MUSCULOSKELETAL: - arthralgias, - myalgias.  NEURO:  - weakness, - numbness.  PSYCH: - depression, - anxiety, - suicidal thoughts. SKIN: - rashes, - lesions.  All remaining ROS are negative.Allergies    No Known Allergies    Intolerances 89y Male PMH Macular Degenerative Disease, AS s/p TAVR, GI bleed, HLD, Depression, BPH, EF 65-70% & Scientology, s/p L Femoral Shaft Fracture s/p IMN 5/2019, sent from [facility] for fever and persistent cough.  Per MD at [facility]), pt had been treated for cough, started on Zosyn x 4 days with no improvement.  Noted fever today and transferred to ED for further evaluation.  He admits to generalized fatigue, denies SOB.  Noted in Afib / Aflutter with RVR, controlled with Cardizem and BBlocker.  RVP + for parainfluenza.  CXR showed worsening infiltrate.  Feeling better in ED.  No additional complaints.    FAMILY HISTORY: reviewed and negative.  SOCIAL HISTORY: - alcohol, - IVDA, - smoking.  REVIEW OF SYSTEMS: General: + fatigue, - weight loss, + fevers, - chills.  HEENT: - headache, - hearing disturbances.  EYES: - visual disturbances, - diplopia.  CARDIOVASCULAR: - chest pain, - palpitations.  PULMONARY: - SOB, +cough, - hemoptysis.  GI: - abdominal pain, - nausea, - vomiting, - diarrhea, - constipation.  : - urinary urgency, - frequency, - dysuria.  MUSCULOSKELETAL: - arthralgias, - myalgias.  NEURO:  - weakness, - numbness.  PSYCH: - depression, - anxiety, - suicidal thoughts. SKIN: - rashes, - lesions.  All remaining ROS are negative.Allergies    No Known Allergies    Intolerances

## 2019-07-24 NOTE — H&P ADULT - ASSESSMENT
> Sepsis due to Pneumonia / Parainfluenza infection - No improvement on Zosyn.  Started on Cefepime / Vancomycin.  ID consult d/w Dr. Page.  F/u cultures.   > Afib with RVR - now rate controlled.  Cardiology input appreciated.  Remains on Cardizem, Lopressor.  Start on ac eliquis.  Monitor for s/s of bleeding.   > Hypokalemia - supplement lytes, repeat K.   > GERD - continue PPI  > Depression - continue escitalopram  > BPH - continue Flomax.  > Dementia - continue Memantin.  DVT Proph - on ac  discussed with Dr. Shearer - he has contacted pt's son regarding plan of care and was in agreement. > Sepsis due to Pneumonia / Parainfluenza infection - No improvement on Zosyn.  Concern for gram positive and gram negative organisms.  Started on Cefepime / Vancomycin.  ID consult d/w Dr. Page.  F/u cultures.   > Afib with RVR - now rate controlled.  Cardiology input appreciated.  Remains on Cardizem, Lopressor.  Start on ac eliquis.  Monitor for s/s of bleeding.   > Hypokalemia - supplement lytes, repeat K.   > GERD - continue PPI  > Depression - continue escitalopram  > BPH - continue Flomax.  > Dementia - continue Memantin.  DVT Proph - on ac  discussed with Dr. Shearer - he has contacted pt's son regarding plan of care and was in agreement.

## 2019-07-24 NOTE — ED PROVIDER NOTE - PROGRESS NOTE DETAILS
ekg with aflutter, 10 mg IV cardizem administered, HR improved to 100-110. Low grade rectal temp of 100.2 and obvious pna on exam and cxr. Will attempt to improve fever, give IV hydration, and tx pna prior to any further rate control

## 2019-07-24 NOTE — ED PROVIDER NOTE - MUSCULOSKELETAL, MLM
Spine appears normal, range of motion is not limited, no muscle or joint tenderness, 1 cm ulcer to the L heal, no erythema/induration/drainage

## 2019-07-24 NOTE — PATIENT PROFILE ADULT - NSPROMEDSBROUGHTTOHOSP_GEN_A_NUR
pt states she had sutures removed out of her left leg yesterday by visiting nurse service and pt states wound is still open needs to be cleaned and sutured again as per Dr. Rodriguez
no

## 2019-07-24 NOTE — PATIENT PROFILE ADULT - ABILITY TO HEAR (WITH HEARING AID OR HEARING APPLIANCE IF NORMALLY USED):
wears hearing aides at home/Mildly to Moderately Impaired: difficulty hearing in some environments or speaker may need to increase volume or speak distinctly

## 2019-07-24 NOTE — H&P ADULT - NSICDXPASTMEDICALHX_GEN_ALL_CORE_FT
PAST MEDICAL HISTORY:  Anemia     Aortic stenosis     BPH (benign prostatic hyperplasia)     Depression     Endocarditis 2013    Fatigue     GI bleed     HLD (hyperlipidemia)     Macular degeneration     Narcolepsy     UTI (urinary tract infection) 2013

## 2019-07-24 NOTE — H&P ADULT - NSHPLABSRESULTS_GEN_ALL_CORE
VITALS:  Vital Signs Last 24 Hrs  T(C): 37.9 (24 Jul 2019 08:41), Max: 37.9 (24 Jul 2019 08:41)  T(F): 100.2 (24 Jul 2019 08:41), Max: 100.2 (24 Jul 2019 08:41)  HR: 106 (24 Jul 2019 11:46) (106 - 133)  BP: 166/71 (24 Jul 2019 11:46) (154/99 - 166/71)  BP(mean): --  RR: 19 (24 Jul 2019 11:46) (18 - 19)  SpO2: 94% (24 Jul 2019 11:46) (94% - 94%) Daily Height in cm: 167.64 (24 Jul 2019 09:07)    Daily   CAPILLARY BLOOD GLUCOSE        I&O's Summary      LABS:                        12.3   6.44  )-----------( 197      ( 24 Jul 2019 08:50 )             37.6     07-24    139  |  100  |  47.0<H>  ----------------------------<  145<H>  2.8<LL>   |  26.0  |  1.27    Ca    9.0      24 Jul 2019 08:50    TPro  7.1  /  Alb  3.5  /  TBili  0.3<L>  /  DBili  x   /  AST  31  /  ALT  15  /  AlkPhos  77  07-24    PT/INR - ( 24 Jul 2019 08:50 )   PT: 12.7 sec;   INR: 1.10 ratio         PTT - ( 24 Jul 2019 08:50 )  PTT:33.8 sec  LIVER FUNCTIONS - ( 24 Jul 2019 08:50 )  Alb: 3.5 g/dL / Pro: 7.1 g/dL / ALK PHOS: 77 U/L / ALT: 15 U/L / AST: 31 U/L / GGT: x                     MEDICATIONS:  apixaban 2.5 milliGRAM(s) Oral every 12 hours  aspirin  chewable 81 milliGRAM(s) Oral daily  cefepime   IVPB      cefepime   IVPB 1000 milliGRAM(s) IV Intermittent once  docusate sodium 100 milliGRAM(s) Oral three times a day  escitalopram 20 milliGRAM(s) Oral daily  memantine 10 milliGRAM(s) Oral daily  metoprolol tartrate 25 milliGRAM(s) Oral four times a day  tamsulosin Oral Tab/Cap - Peds 0.4 milliGRAM(s) Oral at bedtime

## 2019-07-24 NOTE — H&P ADULT - NSHPPHYSICALEXAM_GEN_ALL_CORE
PHYSICAL EXAMINATION:  GENERAL: NAD, Alert and Oriented x 3, slightly confused. HEENT:  Normocephalic and atraumatic.  Extraocular muscles are intact.  NECK: Supple.  - JVD.  CARDIOVASCULAR: irregular S1, S2.  LUNGS: coarse BS b/l, no wheezing.  BACK: - CVA tenderness.  ABDOMEN: Soft, - tenderness, - distension, + BS.  EXTREMITIES: - cyanosis, - clubbing, - edema.  NEUROLOGIC: strength is symmetric, sensation intact, speech fluent.  PSYCHIATRIC: Calm.  - agitation.    SKIN: - rashes, - lesions.

## 2019-07-24 NOTE — ED ADULT NURSE NOTE - CHIEF COMPLAINT QUOTE
Patient sent from [facility], as per EMS patient was sent to ED for tachycardia, no hx of a-flutter, patient has hx of pneumonia, patient only complaint at this time is feeling tired. MD Cervantes at bedside

## 2019-07-24 NOTE — CHART NOTE - NSCHARTNOTEFT_GEN_A_CORE
Pt noted to require high FIO2.  SaO2 on 50% VM 90-92%.  RR ~ 30.   Continues to have b/l crackles.  B/l LE mottling, not noted earlier today.  Pt reports feeling comfortable.   ABG showed 7.40/39/62/24    Placed on high flow O2.   MICU consulted for evaluation.  Contacted pt's son Raimundo at [phone] to discuss goals of care.   I discussed advanced directives with the patient / family - made aware of limited prognosis if patient were to be intubated or resuscitated, in consideration of age and comorbid conditions.  They wished for no advanced directives - that all interventions be pursued.

## 2019-07-24 NOTE — ED ADULT NURSE NOTE - OBJECTIVE STATEMENT
Son at bedside stating patient has been having intermittent fevers at nursing home, productive cough, was sent to ED today for "tachycardia", new onset a-flutter.  rectal temp 100.2 upon ED arrival

## 2019-07-24 NOTE — ED ADULT NURSE NOTE - NSIMPLEMENTINTERV_GEN_ALL_ED
Implemented All Fall with Harm Risk Interventions:  Mobile to call system. Call bell, personal items and telephone within reach. Instruct patient to call for assistance. Room bathroom lighting operational. Non-slip footwear when patient is off stretcher. Physically safe environment: no spills, clutter or unnecessary equipment. Stretcher in lowest position, wheels locked, appropriate side rails in place. Provide visual cue, wrist band, yellow gown, etc. Monitor gait and stability. Monitor for mental status changes and reorient to person, place, and time. Review medications for side effects contributing to fall risk. Reinforce activity limits and safety measures with patient and family. Provide visual clues: red socks.

## 2019-07-24 NOTE — ED PROVIDER NOTE - CLINICAL SUMMARY MEDICAL DECISION MAKING FREE TEXT BOX
pt with hcap pna. HR improved with cardizem and with hydration and tx of pna, will likely continue to improve

## 2019-07-25 LAB
-  COAGULASE NEGATIVE STAPHYLOCOCCUS: SIGNIFICANT CHANGE UP
ANION GAP SERPL CALC-SCNC: 11 MMOL/L — SIGNIFICANT CHANGE UP (ref 5–17)
APTT BLD: 28.6 SEC — SIGNIFICANT CHANGE UP (ref 27.5–36.3)
BLD GP AB SCN SERPL QL: SIGNIFICANT CHANGE UP
BUN SERPL-MCNC: 38 MG/DL — HIGH (ref 8–20)
CALCIUM SERPL-MCNC: 8.3 MG/DL — LOW (ref 8.6–10.2)
CHLORIDE SERPL-SCNC: 103 MMOL/L — SIGNIFICANT CHANGE UP (ref 98–107)
CO2 SERPL-SCNC: 23 MMOL/L — SIGNIFICANT CHANGE UP (ref 22–29)
CREAT SERPL-MCNC: 1.03 MG/DL — SIGNIFICANT CHANGE UP (ref 0.5–1.3)
GLUCOSE BLDC GLUCOMTR-MCNC: 113 MG/DL — HIGH (ref 70–99)
GLUCOSE SERPL-MCNC: 111 MG/DL — SIGNIFICANT CHANGE UP (ref 70–115)
HCT VFR BLD CALC: 31.6 % — LOW (ref 39–50)
HCT VFR BLD CALC: 35 % — LOW (ref 39–50)
HGB BLD-MCNC: 10.4 G/DL — LOW (ref 13–17)
HGB BLD-MCNC: 11.4 G/DL — LOW (ref 13–17)
INR BLD: 1.17 RATIO — HIGH (ref 0.88–1.16)
MAGNESIUM SERPL-MCNC: 2.1 MG/DL — SIGNIFICANT CHANGE UP (ref 1.6–2.6)
MCHC RBC-ENTMCNC: 29.1 PG — SIGNIFICANT CHANGE UP (ref 27–34)
MCHC RBC-ENTMCNC: 29.3 PG — SIGNIFICANT CHANGE UP (ref 27–34)
MCHC RBC-ENTMCNC: 32.6 GM/DL — SIGNIFICANT CHANGE UP (ref 32–36)
MCHC RBC-ENTMCNC: 32.9 GM/DL — SIGNIFICANT CHANGE UP (ref 32–36)
MCV RBC AUTO: 89 FL — SIGNIFICANT CHANGE UP (ref 80–100)
MCV RBC AUTO: 89.3 FL — SIGNIFICANT CHANGE UP (ref 80–100)
METHOD TYPE: SIGNIFICANT CHANGE UP
OB PNL STL: NEGATIVE — SIGNIFICANT CHANGE UP
PLATELET # BLD AUTO: 176 K/UL — SIGNIFICANT CHANGE UP (ref 150–400)
PLATELET # BLD AUTO: 213 K/UL — SIGNIFICANT CHANGE UP (ref 150–400)
POTASSIUM SERPL-MCNC: 3 MMOL/L — LOW (ref 3.5–5.3)
POTASSIUM SERPL-SCNC: 3 MMOL/L — LOW (ref 3.5–5.3)
PROTHROM AB SERPL-ACNC: 13.5 SEC — HIGH (ref 10–12.9)
RBC # BLD: 3.55 M/UL — LOW (ref 4.2–5.8)
RBC # BLD: 3.92 M/UL — LOW (ref 4.2–5.8)
RBC # FLD: 14.8 % — HIGH (ref 10.3–14.5)
RBC # FLD: 15 % — HIGH (ref 10.3–14.5)
SODIUM SERPL-SCNC: 137 MMOL/L — SIGNIFICANT CHANGE UP (ref 135–145)
WBC # BLD: 5.25 K/UL — SIGNIFICANT CHANGE UP (ref 3.8–10.5)
WBC # BLD: 6.58 K/UL — SIGNIFICANT CHANGE UP (ref 3.8–10.5)
WBC # FLD AUTO: 5.25 K/UL — SIGNIFICANT CHANGE UP (ref 3.8–10.5)
WBC # FLD AUTO: 6.58 K/UL — SIGNIFICANT CHANGE UP (ref 3.8–10.5)

## 2019-07-25 PROCEDURE — 99233 SBSQ HOSP IP/OBS HIGH 50: CPT

## 2019-07-25 PROCEDURE — 99232 SBSQ HOSP IP/OBS MODERATE 35: CPT

## 2019-07-25 RX ORDER — DILTIAZEM HCL 120 MG
10 CAPSULE, EXT RELEASE 24 HR ORAL EVERY 6 HOURS
Refills: 0 | Status: DISCONTINUED | OUTPATIENT
Start: 2019-07-25 | End: 2019-07-30

## 2019-07-25 RX ORDER — TAMSULOSIN HYDROCHLORIDE 0.4 MG/1
0.8 CAPSULE ORAL AT BEDTIME
Refills: 0 | Status: DISCONTINUED | OUTPATIENT
Start: 2019-07-25 | End: 2019-07-31

## 2019-07-25 RX ORDER — SACCHAROMYCES BOULARDII 250 MG
250 POWDER IN PACKET (EA) ORAL
Refills: 0 | Status: DISCONTINUED | OUTPATIENT
Start: 2019-07-25 | End: 2019-07-31

## 2019-07-25 RX ORDER — MORPHINE SULFATE 50 MG/1
1 CAPSULE, EXTENDED RELEASE ORAL ONCE
Refills: 0 | Status: DISCONTINUED | OUTPATIENT
Start: 2019-07-25 | End: 2019-07-26

## 2019-07-25 RX ORDER — POTASSIUM CHLORIDE 20 MEQ
40 PACKET (EA) ORAL EVERY 4 HOURS
Refills: 0 | Status: DISCONTINUED | OUTPATIENT
Start: 2019-07-25 | End: 2019-07-25

## 2019-07-25 RX ORDER — POTASSIUM CHLORIDE 20 MEQ
10 PACKET (EA) ORAL
Refills: 0 | Status: COMPLETED | OUTPATIENT
Start: 2019-07-25 | End: 2019-07-25

## 2019-07-25 RX ORDER — POTASSIUM CHLORIDE 20 MEQ
20 PACKET (EA) ORAL
Refills: 0 | Status: DISCONTINUED | OUTPATIENT
Start: 2019-07-25 | End: 2019-07-25

## 2019-07-25 RX ADMIN — MEMANTINE HYDROCHLORIDE 10 MILLIGRAM(S): 10 TABLET ORAL at 11:31

## 2019-07-25 RX ADMIN — LINEZOLID 300 MILLIGRAM(S): 600 INJECTION, SOLUTION INTRAVENOUS at 17:01

## 2019-07-25 RX ADMIN — ESCITALOPRAM OXALATE 20 MILLIGRAM(S): 10 TABLET, FILM COATED ORAL at 11:31

## 2019-07-25 RX ADMIN — Medication 100 MILLIEQUIVALENT(S): at 08:38

## 2019-07-25 RX ADMIN — Medication 10 MILLIGRAM(S): at 17:00

## 2019-07-25 RX ADMIN — Medication 25 MILLIGRAM(S): at 17:01

## 2019-07-25 RX ADMIN — Medication 81 MILLIGRAM(S): at 11:31

## 2019-07-25 RX ADMIN — Medication 250 MILLIGRAM(S): at 17:02

## 2019-07-25 RX ADMIN — Medication 100 MILLIGRAM(S): at 15:50

## 2019-07-25 RX ADMIN — Medication 100 MILLIEQUIVALENT(S): at 11:31

## 2019-07-25 RX ADMIN — LINEZOLID 300 MILLIGRAM(S): 600 INJECTION, SOLUTION INTRAVENOUS at 06:48

## 2019-07-25 RX ADMIN — Medication 25 MILLIGRAM(S): at 11:32

## 2019-07-25 RX ADMIN — TAMSULOSIN HYDROCHLORIDE 0.8 MILLIGRAM(S): 0.4 CAPSULE ORAL at 23:31

## 2019-07-25 RX ADMIN — ERTAPENEM SODIUM 120 MILLIGRAM(S): 1 INJECTION, POWDER, LYOPHILIZED, FOR SOLUTION INTRAMUSCULAR; INTRAVENOUS at 15:50

## 2019-07-25 RX ADMIN — Medication 100 MILLIEQUIVALENT(S): at 10:03

## 2019-07-25 RX ADMIN — Medication 25 MILLIGRAM(S): at 23:31

## 2019-07-25 RX ADMIN — Medication 25 MILLIGRAM(S): at 06:48

## 2019-07-25 RX ADMIN — Medication 25 MILLIGRAM(S): at 01:06

## 2019-07-25 NOTE — PROGRESS NOTE ADULT - ASSESSMENT
> Sepsis due to Pneumonia / Parainfluenza infection - IV Abx discussed with ID.  F/u cultures.  Concern for gram positive and gram negative organisms.    > Afib with RVR - now rate controlled.  Cardiology input appreciated.  Remains on Cardizem, Lopressor.  Started on eliquis.  Monitor for s/s of bleeding.   > Hypokalemia - supplement lytes, repeat K.   > GERD - continue PPI  > Depression - continue escitalopram  > BPH - continue Flomax.  > Dementia - continue Memantin.  DVT Proph - on ac  Per son, no advanced directives.  He was in agreement with anticoagulation, acknowledged risks benefits and alternatives yesterday.

## 2019-07-25 NOTE — PROGRESS NOTE ADULT - ASSESSMENT
This 89y Male PMH Macular Degenerative Disease, AS s/p TAVR, GI bleed, HLD, Depression, BPH, EF 65-70% & Confucianism, s/p L Femoral Shaft Fracture s/p IMN 5/2019, sent from [facility]) for fever and persistent cough.  Per MD at [facility], pt had been treated for cough, started on Zosyn x 4 days with no improvement.     RVP + for parainfluenza 3  multifocal PNA on CXR  "Large right upper and left lower lobe pneumonia. Follow-up to complete resolution."    fevers with temp of 100.2 in the ER.   desaturation to 92 Percent on nasal cannula.     - regarding Multifocal PNA  likely from parainfluenza virus infection  continue Ertapenem and Linezolid to cover for bacterial suprainfection, including MSSA/ MRSA which can occur after viral pneumonia  - check sputum cx if not already done  - anticipate 7 day course of antibiotics      Labs show elevated Creatinine  - possible CKD - with ARF features  - improving  - antibiotics have been adjusted accordingly.    - follow up all outstanding cultures  - trend temperature and WBC curve  - repeat cultures from blood and all sources if febrile.

## 2019-07-25 NOTE — SWALLOW BEDSIDE ASSESSMENT ADULT - SWALLOW EVAL: DIAGNOSIS
Oral stage of swallow WFL for puree, mech soft, & thin liquids. Oral dysphagia noted for soft marked by increased mastication time. Pharyngeal stage of swallow clinically judged to be unremarkable for puree, mech soft, soft, & thin liquids, with no overt s/s of penetration & aspiration. Mild oral dysphagia for puree, mech soft, & thin liquids; moderate oral dysphagia noted for soft.  Pharyngeal stage of swallow clinically judged to be unremarkable for puree, mech soft, soft, & thin liquids, with no overt s/s of penetration & aspiration.

## 2019-07-25 NOTE — SWALLOW BEDSIDE ASSESSMENT ADULT - COMMENTS
As per MD note: "89y Male PMH Macular Degenerative Disease, AS s/p TAVR, GI bleed, HLD, Depression, BPH, EF 65-70% & Caodaism, s/p L Femoral Shaft Fracture s/p IMN 5/2019, sent from [facility] for fever and persistent cough.  Per MD at [facility], pt had been treated for cough, started on Zosyn x 4 days with no improvement.  Noted fever today and transferred to ED for further evaluation.  He admits to generalized fatigue, denies SOB.  Noted in Afib / Aflutter with RVR, controlled with Cardizem and BBlocker.  RVP + for parainfluenza.  CXR showed worsening infiltrate.  Feeling better in ED.  No additional complaints."

## 2019-07-25 NOTE — CONSULT NOTE ADULT - ASSESSMENT
1. 89 yo male with PNA-RUL on cxr. Pt transferred from [facility]-will need a broad spectrum approach unless cultures suggest otherwise.  2. rapid atrial flutter-new-not unusual in this age with PNA. Rate control with betablockers (was on metoprolol as o/p). consider electric cardioversion after infection controlled and if dysrhythmia persists. Will start low dose Eliquis 2.5 bid.   3. dehydration-elevated bun and creatine poor skin turgor-IV fluids.  4. hypokalemia-replete K+ and check mag levels.
1: Sepsis secondary to RUL HCAP with underlying viral illness secondary to parainfluenza  2: Acute hypoxic respiratory failure   3: Afib/flutter with RVR  4: Mild diastolic heart failure with hx of AS s/p TVAR with minimal AI    Patient seen and examined   Full code   I would recommend admitting to SDU for now, but low threshold for ICU admission, especially if worsening hypoxia/ resp failure on HFNC   POC d/w patient, RN and Primary team     Neuro:   Pain Mx: None for now   Sedation/Anxiolytic: None; could use Xanax as needed   home dose Lexapro, Namenda       Cardiovascular:   MAP Target: 65 and above  Agree with PO CCB and BB; would add PRN IV Lopressor   C/w Eliquis   Would recommend maintenance IVF for now; s/p 30 cc/kg bolus     Resp/Chest:   Would recommend HFNC at 60LPM with gradual titration to maintain Saturation > 90 and repeat ABG couple hours of HFNC  Duoneb ATC; Chest PT recommended (bed vs vest vs manual) q2-4 hour     Gi/Nutrition:   Diet: NPO except meds; would recommend speech eval mavis once resp status improves   Bowel Regimen as needed    ID:   Microbiological studies: Blood cx sent; RVP +; would recommend adding procalcitonin   Abx: Abx changed to Zyvox and Ertapenem (already had recieved 4 days of Zosyn in [facility])   Would add probiotics for now     Nephro/Electrolyte/Acid-Base:   Tamsulosin as you are doing   Avoid nephrotoxic agents  Strict I&O with Cr monitoring   Close Electrolyte monitoring and correction as needed     Endocrinology:   no active issues     Haem/Oncology:   Mechanical and Chemical DVT ppx with eliquis at current dose
Attempted to place waddell without success    Cystoscopy performed - false passage noted.  entered bladder - over guidewire, placed a 16 Fr councill tip catheter.  Drained about 500 ml bloody urine.    Leave waddell catheter.  Irrigate waddell as needed.  Increase flomax to 0.8 mg.  Hold eliquis if medically ok to hold until urine is clear.  will follow.
This 89y Male PMH Macular Degenerative Disease, AS s/p TAVR, GI bleed, HLD, Depression, BPH, EF 65-70% & Holiness, s/p L Femoral Shaft Fracture s/p IMN 5/2019, sent from [facility]) for fever and persistent cough.  Per MD at [facility], pt had been treated for cough, started on Zosyn x 4 days with no improvement.     RVP + for parainfluenza 3  multifocal PNA on CXR  "Large right upper and left lower lobe pneumonia. Follow-up to complete resolution."    fevers with temp of 100.2 in the ER.   desaturation to 92 Percent on nasal cannula.     - regarding Multifocal PNA  likely from parainfluenza virus infection  added Ertapenem and Linezolid to cover for bacterial suprainfection, including MSSA/ MRSA which can occur after viral pneumonia  regarding desaturation, consider ABG.  If hypoxic, may need higher level of care      Labs show elevated Creatinine  - possible CKD  - antibiotics have been adjusted accordingly.    - follow up all outstanding cultures  - trend temperature and WBC curve  - repeat cultures from blood and all sources if febrile.

## 2019-07-25 NOTE — PHYSICAL THERAPY INITIAL EVALUATION ADULT - PERTINENT HX OF CURRENT PROBLEM, REHAB EVAL
Pt is a 89 y/o male who presented from [facility] with cough. (+) para-influenza, sepsis, PNA.

## 2019-07-25 NOTE — SWALLOW BEDSIDE ASSESSMENT ADULT - ORAL PREPARATORY PHASE
Within functional limits Decreased mastication ability Chewing pattern observed/Within functional limits

## 2019-07-25 NOTE — CONSULT NOTE ADULT - SUBJECTIVE AND OBJECTIVE BOX
Chief Complaint: 89 yo male transferred from [facility] facility with lethargy/hypoxia and rapid pulse.    HPI: Old records reviewed. Elderly male in [facility] facility following surgery for fx'd left hip in April. This AM noted to be increasingly lethargic/weak with rapid pulse and decreased pulse Ox. In ER he had a low grade fever/rapid atrial flutter on ekg and abnl cxr compatible with PNA. PMH+ for endocarditis in 2013. Underwent a TAVR in 2016 for severe aortic stenosis. (MALISSA showed LVH with nl EF and severe AS). Was being treated for "bronchitis" at [facility]. Pt denies cp or sob but notes thick sputum production. He denies fever or chills. PMH neg for MI/cp/cva/syncope/seizure/pulmonary/renal/hepatic/ or endocrine hx. No other surgery. Denies allergy. Nonsmoker/etoh. ROS neg for edema/orthopnea/pnd. OP meds: 81 asa/escitalopram/pepcoid/FE and vit C/namenda/lopressor 25 bid/modafinil 20 qd/flomax/ubiquinone 100.    PAST MEDICAL & SURGICAL HISTORY:  Macular degeneration  UTI (urinary tract infection): 2013  Narcolepsy  Aortic stenosis  GI bleed  HLD (hyperlipidemia)  Depression  Anemia  Endocarditis: 2013  BPH (benign prostatic hyperplasia)  Fatigue  S/P TAVR (transcatheter aortic valve replacement)  S/P cataract extraction and insertion of intraocular lens: bilat      PREVIOUS DIAGNOSTIC TESTING:      ECHO  FINDINGS: 2018-well functioning TAVR/lvEF 60+%/mild AI and MR.    STRESS  FINDINGS:    CATHETERIZATION  FINDINGS:    MEDICATIONS  (STANDING):  diltiazem    Tablet 30 milliGRAM(s) Oral Once  potassium chloride  10 mEq/100 mL IVPB 10 milliEquivalent(s) IV Intermittent once  vancomycin  IVPB 1000 milliGRAM(s) IV Intermittent once    MEDICATIONS  (PRN):      FAMILY HISTORY:  No pertinent family history in first degree relatives      SOCIAL HISTORY: retired     CIGARETTES: 0    ALCOHOL: 0    ROS: Negative other than as mentioned in HPI.    Vital Signs Last 24 Hrs  T(C): 37.9 (24 Jul 2019 08:41), Max: 37.9 (24 Jul 2019 08:41)  T(F): 100.2 (24 Jul 2019 08:41), Max: 100.2 (24 Jul 2019 08:41)  HR: 125 irreg (24 Jul 2019 08:41) (133 - 133)  BP: 120/80 la manually(24 Jul 2019 08:41) (154/99 - 154/99)  BP(mean): --  RR: 18 ora (24 Jul 2019 08:41) (18 - 18)  SpO2: 94% (24 Jul 2019 08:41) (94% - 94%)    PHYSICAL EXAM:  General: Appears well developed, well nourished alert and cooperative. very elderly frail male with low grade fever nad.  HEENT: Head; normocephalic, atraumatic.  Eyes;   Pupils reactive, cornea wnl. bilat arcus senilis  Neck; Supple, no nodes adenopathy, no NVD or carotid bruit or thyromegaly.  CARDIOVASCULAR; No murmur, rub, gallop or lift. Normal S1 and S2. irreg rapid rate  LUNGS; Very coarse BS bilat with ronchi.  ABDOMEN ; Soft, nontender without mass or organomegaly. bowel sounds normoactive. No scars.  EXTREMITIES; No clubbing, cyanosis or edema. Distal pulses ? ROM normal.  SKIN; warm dry w poor turgor..  NEURO; Alert/oriented x 3/normal motor exam. No pathologic reflexes.    PSYCH; normal affect.            INTERPRETATION OF TELEMETRY:    ECG: tracing reviewed. rapid irreg atrial flutter. (prior ekgs from April adm-SR.  CXR-image reviewed. Extensive RUL infiltrate.  ct of head from April-old lacunar infarcts.    I&O's Detail      LABS:                        12.3   6.44  )-----------( 197      ( 24 Jul 2019 08:50 )             37.6     07-24    139  |  100  |  47.0<H>  ----------------------------<  145<H>    creatine from April 29th-0.85  2.8<LL>   |  26.0  |  1.27    Ca    9.0      24 Jul 2019 08:50    TPro  7.1  /  Alb  3.5  /  TBili  0.3<L>  /  DBili  x   /  AST  31  /  ALT  15  /  AlkPhos  77  07-24        PT/INR - ( 24 Jul 2019 08:50 )   PT: 12.7 sec;   INR: 1.10 ratio         PTT - ( 24 Jul 2019 08:50 )  PTT:33.8 sec    I&O's Summary      RADIOLOGY & ADDITIONAL STUDIES:
HPI:  89y Male PMH Macular Degenerative Disease, AS s/p TAVR, GI bleed, HLD, Depression, BPH, EF 65-70% & Pentecostalism, s/p L Femoral Shaft Fracture s/p IMN 5/2019, sent from [facility] for fever and persistent cough.  Per MD at [facility], pt had been treated for cough, started on Zosyn x 4 days with no improvement.  Noted fever today and transferred to ED for further evaluation.  He admits to generalized fatigue, denies SOB.  Noted in Afib / Aflutter with RVR, controlled with Cardizem and BBlocker.  RVP + for parainfluenza.  CXR showed worsening infiltrate.  Feeling better in ED.  No additional complaints.    The patient was noted to have decreased urine output.  Staff unable to place waddell catheter.  The patient sees Dr. Hill.  He is s/p TURP a number of years ago.  The patient was not having difficulty urinating prior to hospitalization.  He is currently being treated for pneumonia.  Bladder scan demonstrated >800 ml    FAMILY HISTORY: reviewed and negative.  SOCIAL HISTORY: - alcohol, - IVDA, - smoking.  REVIEW OF SYSTEMS: General: + fatigue, - weight loss, + fevers, - chills.  HEENT: - headache, - hearing disturbances.  EYES: - visual disturbances, - diplopia.  CARDIOVASCULAR: - chest pain, - palpitations.  PULMONARY: - SOB, +cough, - hemoptysis.  GI: - abdominal pain, - nausea, - vomiting, - diarrhea, - constipation.  : - urinary urgency, - frequency, - dysuria.  MUSCULOSKELETAL: - arthralgias, - myalgias.  NEURO:  - weakness, - numbness.  PSYCH: - depression, - anxiety, - suicidal thoughts. SKIN: - rashes, - lesions.  All remaining ROS are negative.Allergies    No Known Allergies    Intolerances (24 Jul 2019 13:30)      PAST MEDICAL & SURGICAL HISTORY:  Macular degeneration  UTI (urinary tract infection): 2013  Narcolepsy  Aortic stenosis  GI bleed  HLD (hyperlipidemia)  Depression  Anemia  Endocarditis: 2013  BPH (benign prostatic hyperplasia)  Fatigue  S/P TAVR (transcatheter aortic valve replacement)  S/P cataract extraction and insertion of intraocular lens: bilat      REVIEW OF SYSTEMS:    as per HPI    MEDICATIONS  (STANDING):  apixaban 2.5 milliGRAM(s) Oral every 12 hours  aspirin  chewable 81 milliGRAM(s) Oral daily  docusate sodium 100 milliGRAM(s) Oral three times a day  ertapenem  IVPB 1000 milliGRAM(s) IV Intermittent every 24 hours  escitalopram 20 milliGRAM(s) Oral daily  linezolid  IVPB 600 milliGRAM(s) IV Intermittent every 12 hours  memantine 10 milliGRAM(s) Oral daily  metoprolol tartrate 25 milliGRAM(s) Oral four times a day  saccharomyces boulardii 250 milliGRAM(s) Oral two times a day  tamsulosin Oral Tab/Cap - Peds 0.4 milliGRAM(s) Oral at bedtime    MEDICATIONS  (PRN):  acetaminophen   Tablet .. 650 milliGRAM(s) Oral every 6 hours PRN Temp greater or equal to 38C (100.4F)  diltiazem Injectable 10 milliGRAM(s) IV Push every 6 hours PRN SBP > 160      Allergies    No Known Allergies    Intolerances        SOCIAL HISTORY:    FAMILY HISTORY:  No pertinent family history in first degree relatives      Vital Signs Last 24 Hrs  T(C): 36.3 (25 Jul 2019 15:50), Max: 38.2 (24 Jul 2019 18:11)  T(F): 97.3 (25 Jul 2019 15:50), Max: 100.7 (24 Jul 2019 18:11)  HR: 71 (25 Jul 2019 17:04) (71 - 119)  BP: 148/79 (25 Jul 2019 17:04) (130/58 - 177/85)  BP(mean): 97 (25 Jul 2019 04:00) (87 - 97)  RR: 22 (25 Jul 2019 17:04) (16 - 35)  SpO2: 95% (25 Jul 2019 17:04) (94% - 99%)    PHYSICAL EXAM:    General: Well developed; well nourished; in no acute distress  Head: Normocephalic; atraumatic  Respiratory: No tachypnea  Gastrointestinal: Soft non-tender non-distended;   Genitourinary: No costovertebral angle tenderness.  Urinary bladder is distended  Extremities: Normal range of motion, No edema  Neurological: Alert and oriented x4  Skin: Warm and dry. No acute rash  Musculoskeletal: Normal gait, tone, without deformities  Psychiatric: Cooperative and appropriate      LABS:                        10.4   5.25  )-----------( 176      ( 25 Jul 2019 04:55 )             31.6     07-25    137  |  103  |  38.0<H>  ----------------------------<  111  3.0<L>   |  23.0  |  1.03    Ca    8.3<L>      25 Jul 2019 04:55  Mg     2.1     07-25    TPro  7.1  /  Alb  3.5  /  TBili  0.3<L>  /  DBili  x   /  AST  31  /  ALT  15  /  AlkPhos  77  07-24    PT/INR - ( 24 Jul 2019 08:50 )   PT: 12.7 sec;   INR: 1.10 ratio         PTT - ( 24 Jul 2019 08:50 )  PTT:33.8 sec      RADIOLOGY & ADDITIONAL STUDIES:
[facility]  INFECTIOUS DISEASES AND INTERNAL MEDICINE at [place]  =======================================================  Jossue Up MD  Diplomates American Board of Internal Medicine and Infectious Diseases  Telephone [phone]  Fax            [phone]  =======================================================    [id number]  PRASHANT MORALES   This 89y Male PMH Macular Degenerative Disease, AS s/p TAVR, GI bleed, HLD, Depression, BPH, EF 65-70% & Buddhist, s/p L Femoral Shaft Fracture s/p IMN 5/2019, sent from [facility] )[facility]) for fever and persistent cough.  Per MD at [facility], pt had been treated for cough, started on Zosyn x 4 days with no improvement.  Noted fever today and transferred to ED for further evaluation.  He admits to generalized fatigue, denies SOB.  Noted in Afib / Aflutter with RVR, controlled with Cardizem and BBlocker.  RVP + for parainfluenza.    CXR single view showed "Large right upper and left lower lobe pneumonia. Follow-up to complete resolution."  He had low grade temps of 100.2 in the ER.   Antibiotics Course in the ER:  piperacillin/tazobactam IVPB.   200 mL/Hr (07-24-19 @ 09:23)  vancomycin  IVPB   250 mL/Hr (07-24-19 @ 09:50)    Patient seen and examined.  has some SOB - desat to 92 Percent on nasal cannula.   =======================================================  Past Medical & Surgical Hx:  =====================  PAST MEDICAL & SURGICAL HISTORY:  Macular degeneration  UTI (urinary tract infection): 2013  Narcolepsy  Aortic stenosis  GI bleed  HLD (hyperlipidemia)  Depression  Anemia  Endocarditis: 2013  BPH (benign prostatic hyperplasia)  Fatigue  S/P TAVR (transcatheter aortic valve replacement)  S/P cataract extraction and insertion of intraocular lens: bilat    Social Hx:  =======  no toxic habits currently    FAMILY HISTORY:  No pertinent family history in first degree relatives  no significant family history of immunosuppressive disorders in mother or father   =======================================================  REVIEW OF SYSTEMS:  as above  all other ROS negative  =======================================================  Allergies  No Known Allergies    Antibiotics:  ertapenem  IVPB 1000 milliGRAM(s) IV Intermittent every 24 hours  linezolid  IVPB 600 milliGRAM(s) IV Intermittent every 12 hours    Other medications:  apixaban 2.5 milliGRAM(s) Oral every 12 hours  aspirin  chewable 81 milliGRAM(s) Oral daily  docusate sodium 100 milliGRAM(s) Oral three times a day  escitalopram 20 milliGRAM(s) Oral daily  memantine 10 milliGRAM(s) Oral daily  metoprolol tartrate 25 milliGRAM(s) Oral four times a day  tamsulosin Oral Tab/Cap - Peds 0.4 milliGRAM(s) Oral at bedtime      ======================================================  Physical Exam:  ============  T(F): 100.2 (24 Jul 2019 08:41), Max: 100.2 (24 Jul 2019 08:41)  HR: 106 (24 Jul 2019 11:46)  BP: 166/71 (24 Jul 2019 11:46)  RR: 19 (24 Jul 2019 11:46)  SpO2: 94% (24 Jul 2019 11:46) (94% - 94%)  temp max in last 48H T(F): , Max: 100.2 (07-24-19 @ 08:41)Height (cm): 167.64 (07-24-19 @ 11:34)  Weight (kg): 64.41 (07-24-19 @ 08:28)  BMI (kg/m2): 22.9 (07-24-19 @ 11:34)  BSA (m2): 1.73 (07-24-19 @ 11:34)    General:  No acute distress. THIN FRAIL  Eye: Pupils are equal, round and reactive to light, Extraocular movements are intact, Normal conjunctiva.  HENT: Normocephalic, Oral mucosa is DRY  Neck: Supple, No lymphadenopathy.  Respiratory: Lungs WITH COARSE BREATH SOUNDS BILATERALLY.   Cardiovascular: Normal rate, Regular rhythm,   Gastrointestinal: Soft, Non-tender, Non-distended, Normal bowel sounds.  Genitourinary: No costovertebral angle tenderness.  Lymphatics: No lymphadenopathy neck,   Musculoskeletal: Normal range of motion, Normal strength.  Integumentary: MOTTLED SKIN ON LOWER EXTREMITIES.   Neurologic: Alert, Oriented, No focal deficits, Cranial Nerves II-XII are grossly intact.  Psychiatric: PLEASANT    =======================================================  Labs:                        12.3   6.44  )-----------( 197      ( 24 Jul 2019 08:50 )             37.6       WBC Count: 6.44 K/uL (07-24-19 @ 08:50)      07-24    139  |  100  |  47.0<H>  ----------------------------<  145<H>  2.8<LL>   |  26.0  |  1.27    Ca    9.0      24 Jul 2019 08:50    TPro  7.1  /  Alb  3.5  /  TBili  0.3<L>  /  DBili  x   /  AST  31  /  ALT  15  /  AlkPhos  77  07-24      Creatinine, Serum: 1.27 mg/dL (07-24-19 @ 08:50)    Rapid Respiratory Viral Panel (07.24.19 @ 10:05)    Rapid RVP Result: Detected: This Respiratory Panel uses polymerase chain reaction (PCR) to detect for  adenovirus; coronavirus (HKU1, NL63, 229E, OC43); human metapneumovirus  (hMPV); human enterovirus/rhinovirus (Entero/RV); influenza A; influenza  A/H1; influenza A/H3; influenza A/H1-2009; influenza B; parainfluenza  viruses 1, 2, 3, 4; respiratory syncytial virus; Mycoplasma pneumoniae;  and Chlamydophila pneumoniae.    Parainfluenza 3 (RapRVP): Detected  ==========  EXAM:  XR CHEST PORTABLE IMMED 1V                        PROCEDURE DATE:  07/24/2019    INTERPRETATION:  XR CHEST IMMEDIATE  Single AP view  HISTORY:  Sepsis  Comparison: Chest x-ray 4/26/2019    The cardiac silhouette is within normal limits. Large consolidation in the right upper and left lower lobes. No pleural abnormality.    IMPRESSION: Large right upper and left lower lobe pneumonia. Follow-up to complete resolution.      SIRIA JARQUIN M.D., ATTENDING RADIOLOGIST  This document has been electronically signed. Jul 24 2019 11:13AM
Patient is a 89y old  Male who presents with a chief complaint of Fever (24 Jul 2019 14:22)      HPI/BRIEF HOSPITAL COURSE: (obtained from chart/providers/patient)    89 y/o  male was admitted to [facility] this AM with HCAP, Afib RVR, sepsis, resp failure after being sent from [facility] with SOB for few days (treated with zosyn for 4 days)   ICU called as patient had hypoxia and resp distress after initial resuscitation for sepsis with IVF/ Abx and septic w/u.   During our assessment, patient claimed that he had SOB but did not have any other complaints.         PAST MEDICAL & SURGICAL HISTORY:  Macular degeneration  UTI (urinary tract infection): 2013  Narcolepsy  Aortic stenosis  GI bleed  HLD (hyperlipidemia)  Depression  Anemia  Endocarditis: 2013  BPH (benign prostatic hyperplasia)  Fatigue  S/P TAVR (transcatheter aortic valve replacement)  S/P cataract extraction and insertion of intraocular lens: bilat    All systems reviewed with symptoms mentions as above.       Physical Examination:    ICU Vital Signs Last 24 Hrs  T(C): 37.9 (24 Jul 2019 08:41), Max: 37.9 (24 Jul 2019 08:41)  T(F): 100.2 (24 Jul 2019 08:41), Max: 100.2 (24 Jul 2019 08:41)  HR: 106 (24 Jul 2019 11:46) (106 - 133)  BP: 166/71 (24 Jul 2019 11:46) (154/99 - 166/71)  BP(mean): --  ABP: --  ABP(mean): --  RR: 19 (24 Jul 2019 11:46) (18 - 19)  SpO2: 94% (24 Jul 2019 11:46) (94% - 94%)      General: Minimal resp distress  Talking in full sentences  with minimal accessory muscles use     Neuro: AAO*3, No motor, sensory, or cranial nerve deficit    HEENT: Pupils equal, reactive to light, Oral mucosa dry with poor dentition     PULM: Decreased breath sounds with Rhonchi in right lung      CVS: Irregular rhythm and controlled rate, no murmurs, rubs, or gallops    ABD: Soft, nondistended, nontender, normoactive bowel sounds, no CVA tenderness    EXT: No b/l LE edema, nontender with pedal pulse palpable     SKIN: Warm and well perfused, no acute rashes, left foot in boot, skin mottling around knee b/l        Medications:  ertapenem  IVPB 1000 milliGRAM(s) IV Intermittent every 24 hours  linezolid  IVPB 600 milliGRAM(s) IV Intermittent every 12 hours  metoprolol tartrate 25 milliGRAM(s) Oral four times a day  tamsulosin Oral Tab/Cap - Peds 0.4 milliGRAM(s) Oral at bedtime  escitalopram 20 milliGRAM(s) Oral daily  memantine 10 milliGRAM(s) Oral daily  apixaban 2.5 milliGRAM(s) Oral every 12 hours  aspirin  chewable 81 milliGRAM(s) Oral daily  docusate sodium 100 milliGRAM(s) Oral three times a day      RADIOLOGY/ Microbiology/ Labs: reviewed     CRITICAL CARE TIME SPENT: 60 min

## 2019-07-25 NOTE — PROGRESS NOTE ADULT - ASSESSMENT
1. Multilobar parainfluenza PNA. On broad spectrum coverage.  2. atrial flutter resolved to SR. Continue Eliquis  3. TAVR  4. hypokalemia-replete K+.

## 2019-07-25 NOTE — PHYSICAL THERAPY INITIAL EVALUATION ADULT - ADDITIONAL COMMENTS
Pt is currently from [facility]. Prior to that he lived with his son in a house with 4 ANDREEA with a HR and no steps inside.

## 2019-07-25 NOTE — SWALLOW BEDSIDE ASSESSMENT ADULT - SLP GENERAL OBSERVATIONS
Pt received & seen seated upright, in bed, +awake/alert, +reduced hearing acuity, +O2 via NC (sats 97%), +chewing pattern observed prior to PO trials, +0/10 pain. Pt received & seen seated upright, in bed, +awake/alert, +reduced hearing acuity, +O2 via NC (sats 97%), +baseline reflexive chew pattern observed prior to PO trials, +0/10 pain.

## 2019-07-26 DIAGNOSIS — R31.0 GROSS HEMATURIA: ICD-10-CM

## 2019-07-26 DIAGNOSIS — R33.9 RETENTION OF URINE, UNSPECIFIED: ICD-10-CM

## 2019-07-26 DIAGNOSIS — N40.1 BENIGN PROSTATIC HYPERPLASIA WITH LOWER URINARY TRACT SYMPTOMS: ICD-10-CM

## 2019-07-26 LAB
-  AMPICILLIN/SULBACTAM: SIGNIFICANT CHANGE UP
-  CEFAZOLIN: SIGNIFICANT CHANGE UP
-  CLINDAMYCIN: SIGNIFICANT CHANGE UP
-  ERYTHROMYCIN: SIGNIFICANT CHANGE UP
-  GENTAMICIN: SIGNIFICANT CHANGE UP
-  OXACILLIN: SIGNIFICANT CHANGE UP
-  PENICILLIN: SIGNIFICANT CHANGE UP
-  RIFAMPIN: SIGNIFICANT CHANGE UP
-  TETRACYCLINE: SIGNIFICANT CHANGE UP
-  TRIMETHOPRIM/SULFAMETHOXAZOLE: SIGNIFICANT CHANGE UP
-  VANCOMYCIN: SIGNIFICANT CHANGE UP
ANION GAP SERPL CALC-SCNC: 12 MMOL/L — SIGNIFICANT CHANGE UP (ref 5–17)
BUN SERPL-MCNC: 30 MG/DL — HIGH (ref 8–20)
CALCIUM SERPL-MCNC: 8.3 MG/DL — LOW (ref 8.6–10.2)
CHLORIDE SERPL-SCNC: 103 MMOL/L — SIGNIFICANT CHANGE UP (ref 98–107)
CO2 SERPL-SCNC: 20 MMOL/L — LOW (ref 22–29)
CREAT SERPL-MCNC: 0.75 MG/DL — SIGNIFICANT CHANGE UP (ref 0.5–1.3)
GLUCOSE SERPL-MCNC: 104 MG/DL — SIGNIFICANT CHANGE UP (ref 70–115)
HCT VFR BLD CALC: 32.6 % — LOW (ref 39–50)
HGB BLD-MCNC: 10.5 G/DL — LOW (ref 13–17)
MCHC RBC-ENTMCNC: 29.6 PG — SIGNIFICANT CHANGE UP (ref 27–34)
MCHC RBC-ENTMCNC: 32.2 GM/DL — SIGNIFICANT CHANGE UP (ref 32–36)
MCV RBC AUTO: 91.8 FL — SIGNIFICANT CHANGE UP (ref 80–100)
METHOD TYPE: SIGNIFICANT CHANGE UP
PLATELET # BLD AUTO: 160 K/UL — SIGNIFICANT CHANGE UP (ref 150–400)
POTASSIUM SERPL-MCNC: 3.4 MMOL/L — LOW (ref 3.5–5.3)
POTASSIUM SERPL-SCNC: 3.4 MMOL/L — LOW (ref 3.5–5.3)
RBC # BLD: 3.55 M/UL — LOW (ref 4.2–5.8)
RBC # FLD: 15.2 % — HIGH (ref 10.3–14.5)
SODIUM SERPL-SCNC: 135 MMOL/L — SIGNIFICANT CHANGE UP (ref 135–145)
WBC # BLD: 5.22 K/UL — SIGNIFICANT CHANGE UP (ref 3.8–10.5)
WBC # FLD AUTO: 5.22 K/UL — SIGNIFICANT CHANGE UP (ref 3.8–10.5)

## 2019-07-26 PROCEDURE — 99233 SBSQ HOSP IP/OBS HIGH 50: CPT | Mod: GC

## 2019-07-26 PROCEDURE — 99232 SBSQ HOSP IP/OBS MODERATE 35: CPT

## 2019-07-26 RX ADMIN — Medication 100 MILLIGRAM(S): at 14:09

## 2019-07-26 RX ADMIN — APIXABAN 2.5 MILLIGRAM(S): 2.5 TABLET, FILM COATED ORAL at 10:54

## 2019-07-26 RX ADMIN — Medication 25 MILLIGRAM(S): at 17:09

## 2019-07-26 RX ADMIN — LINEZOLID 300 MILLIGRAM(S): 600 INJECTION, SOLUTION INTRAVENOUS at 06:46

## 2019-07-26 RX ADMIN — Medication 100 MILLIGRAM(S): at 06:46

## 2019-07-26 RX ADMIN — MEMANTINE HYDROCHLORIDE 10 MILLIGRAM(S): 10 TABLET ORAL at 11:34

## 2019-07-26 RX ADMIN — ESCITALOPRAM OXALATE 20 MILLIGRAM(S): 10 TABLET, FILM COATED ORAL at 11:34

## 2019-07-26 RX ADMIN — Medication 25 MILLIGRAM(S): at 11:34

## 2019-07-26 RX ADMIN — Medication 100 MILLIGRAM(S): at 22:05

## 2019-07-26 RX ADMIN — ERTAPENEM SODIUM 120 MILLIGRAM(S): 1 INJECTION, POWDER, LYOPHILIZED, FOR SOLUTION INTRAMUSCULAR; INTRAVENOUS at 14:08

## 2019-07-26 RX ADMIN — Medication 250 MILLIGRAM(S): at 17:08

## 2019-07-26 RX ADMIN — Medication 81 MILLIGRAM(S): at 11:34

## 2019-07-26 RX ADMIN — TAMSULOSIN HYDROCHLORIDE 0.8 MILLIGRAM(S): 0.4 CAPSULE ORAL at 22:05

## 2019-07-26 RX ADMIN — Medication 250 MILLIGRAM(S): at 06:46

## 2019-07-26 RX ADMIN — Medication 25 MILLIGRAM(S): at 06:46

## 2019-07-26 NOTE — PROGRESS NOTE ADULT - ASSESSMENT
This 89y Male PMH Macular Degenerative Disease, AS s/p TAVR, GI bleed, HLD, Depression, BPH, EF 65-70% & Hinduism, s/p L Femoral Shaft Fracture s/p IMN 5/2019, sent from [facility] )[facility]) for fever and persistent cough.  Per MD at [facility], pt had been treated for cough, started on Zosyn x 4 days with no improvement.     RVP + for parainfluenza 3  multifocal PNA on CXR  "Large right upper and left lower lobe pneumonia. Follow-up to complete resolution."    fevers with temp of 100.2 in the ER.  now resolved  desaturation to 92 Percent on nasal cannula.  - no improved    - regarding Multifocal PNA  likely from parainfluenza virus infection  continue Ertapenem for a 7 day course  - stop Linezolid,  no MRSA so far, unable to produce sputum  - will monitor    Coag negative staph in blood  - contaminant   - will not treat    Labs show elevated Creatinine  - possible CKD - with ARF features  - improving  - antibiotics have been adjusted accordingly.      please call us P.R.N over the weekend should any new developments occur.

## 2019-07-26 NOTE — PROGRESS NOTE ADULT - ASSESSMENT
> Sepsis due to Complex Pneumonia / Parainfluenza infection / S/p Acute Hypoxic Respiratory Failure - On Ertapenem.  Concern for gram positive and gram negative organisms.  Now afebrile.   Monitor SaO2.  Transfer to monitored bed with continuous SaO2.   > Afib with RVR - now in NSR.  Rate stable.  Cardiology followup appreciated.  On Metoprolol.  Holding anticoagulation due to hematuria.  > Urinary Retention / Hematuria -  input noted. Holding anticoagulation.  H/H appears to be stable.    > Hypokalemia - supplement lytes, repeat K.   > GERD - continue PPI  > Depression - continue escitalopram  > BPH - continue Flomax.  > Dementia - continue Memantin.  DVT Proph - add ICDs    Per discussion with son on 7/24, no advanced directives.  The Hospitalist Service will assume care of this patient beginning tomorrow. > Sepsis due to Complex Pneumonia / Parainfluenza infection / S/p Acute Hypoxic Respiratory Failure - On Ertapenem.  Concern for gram positive and gram negative organisms.  Now afebrile.   Monitor SaO2.  Transfer to monitored bed with continuous SaO2.   > Afib with RVR - now in NSR.  Rate stable.  Cardiology followup appreciated.  On Metoprolol.  Holding anticoagulation due to hematuria.  > Urinary Retention / Hematuria -  input noted. Holding anticoagulation.  H/H appears to be stable.    > Hypokalemia - supplement lytes, repeat K.   > GERD - continue PPI  > Depression - continue escitalopram  > BPH - continue Flomax.  > Dementia - continue Memantine.  > Dysphagia - Remains on mechanical soft diet.    DVT Proph - add ICDs    Per discussion with son on 7/24, no advanced directives.  The Hospitalist Service will assume care of this patient beginning tomorrow.

## 2019-07-27 LAB
ANION GAP SERPL CALC-SCNC: 12 MMOL/L — SIGNIFICANT CHANGE UP (ref 5–17)
BASOPHILS # BLD AUTO: 0.02 K/UL — SIGNIFICANT CHANGE UP (ref 0–0.2)
BASOPHILS NFR BLD AUTO: 0.3 % — SIGNIFICANT CHANGE UP (ref 0–2)
BUN SERPL-MCNC: 19 MG/DL — SIGNIFICANT CHANGE UP (ref 8–20)
CALCIUM SERPL-MCNC: 8.1 MG/DL — LOW (ref 8.6–10.2)
CHLORIDE SERPL-SCNC: 99 MMOL/L — SIGNIFICANT CHANGE UP (ref 98–107)
CO2 SERPL-SCNC: 25 MMOL/L — SIGNIFICANT CHANGE UP (ref 22–29)
CREAT SERPL-MCNC: 0.62 MG/DL — SIGNIFICANT CHANGE UP (ref 0.5–1.3)
EOSINOPHIL # BLD AUTO: 0.25 K/UL — SIGNIFICANT CHANGE UP (ref 0–0.5)
EOSINOPHIL NFR BLD AUTO: 3.9 % — SIGNIFICANT CHANGE UP (ref 0–6)
GLUCOSE SERPL-MCNC: 132 MG/DL — HIGH (ref 70–115)
HCT VFR BLD CALC: 33.9 % — LOW (ref 39–50)
HGB BLD-MCNC: 11 G/DL — LOW (ref 13–17)
IMM GRANULOCYTES NFR BLD AUTO: 1.9 % — HIGH (ref 0–1.5)
LYMPHOCYTES # BLD AUTO: 1.38 K/UL — SIGNIFICANT CHANGE UP (ref 1–3.3)
LYMPHOCYTES # BLD AUTO: 21.6 % — SIGNIFICANT CHANGE UP (ref 13–44)
MAGNESIUM SERPL-MCNC: 1.9 MG/DL — SIGNIFICANT CHANGE UP (ref 1.6–2.6)
MCHC RBC-ENTMCNC: 28.6 PG — SIGNIFICANT CHANGE UP (ref 27–34)
MCHC RBC-ENTMCNC: 32.4 GM/DL — SIGNIFICANT CHANGE UP (ref 32–36)
MCV RBC AUTO: 88.3 FL — SIGNIFICANT CHANGE UP (ref 80–100)
MONOCYTES # BLD AUTO: 0.68 K/UL — SIGNIFICANT CHANGE UP (ref 0–0.9)
MONOCYTES NFR BLD AUTO: 10.7 % — SIGNIFICANT CHANGE UP (ref 2–14)
NEUTROPHILS # BLD AUTO: 3.93 K/UL — SIGNIFICANT CHANGE UP (ref 1.8–7.4)
NEUTROPHILS NFR BLD AUTO: 61.6 % — SIGNIFICANT CHANGE UP (ref 43–77)
PHOSPHATE SERPL-MCNC: 2.9 MG/DL — SIGNIFICANT CHANGE UP (ref 2.4–4.7)
PLATELET # BLD AUTO: 248 K/UL — SIGNIFICANT CHANGE UP (ref 150–400)
POTASSIUM SERPL-MCNC: 3.9 MMOL/L — SIGNIFICANT CHANGE UP (ref 3.5–5.3)
POTASSIUM SERPL-SCNC: 3.9 MMOL/L — SIGNIFICANT CHANGE UP (ref 3.5–5.3)
RBC # BLD: 3.84 M/UL — LOW (ref 4.2–5.8)
RBC # FLD: 14.7 % — HIGH (ref 10.3–14.5)
SODIUM SERPL-SCNC: 136 MMOL/L — SIGNIFICANT CHANGE UP (ref 135–145)
WBC # BLD: 6.38 K/UL — SIGNIFICANT CHANGE UP (ref 3.8–10.5)
WBC # FLD AUTO: 6.38 K/UL — SIGNIFICANT CHANGE UP (ref 3.8–10.5)

## 2019-07-27 PROCEDURE — 99233 SBSQ HOSP IP/OBS HIGH 50: CPT

## 2019-07-27 RX ORDER — POTASSIUM CHLORIDE 20 MEQ
40 PACKET (EA) ORAL ONCE
Refills: 0 | Status: COMPLETED | OUTPATIENT
Start: 2019-07-27 | End: 2019-07-27

## 2019-07-27 RX ADMIN — MEMANTINE HYDROCHLORIDE 10 MILLIGRAM(S): 10 TABLET ORAL at 14:12

## 2019-07-27 RX ADMIN — Medication 25 MILLIGRAM(S): at 14:03

## 2019-07-27 RX ADMIN — Medication 25 MILLIGRAM(S): at 05:59

## 2019-07-27 RX ADMIN — ESCITALOPRAM OXALATE 20 MILLIGRAM(S): 10 TABLET, FILM COATED ORAL at 14:12

## 2019-07-27 RX ADMIN — ERTAPENEM SODIUM 120 MILLIGRAM(S): 1 INJECTION, POWDER, LYOPHILIZED, FOR SOLUTION INTRAMUSCULAR; INTRAVENOUS at 14:12

## 2019-07-27 RX ADMIN — Medication 25 MILLIGRAM(S): at 17:24

## 2019-07-27 RX ADMIN — Medication 100 MILLIGRAM(S): at 22:43

## 2019-07-27 RX ADMIN — Medication 40 MILLIEQUIVALENT(S): at 17:25

## 2019-07-27 RX ADMIN — TAMSULOSIN HYDROCHLORIDE 0.8 MILLIGRAM(S): 0.4 CAPSULE ORAL at 22:43

## 2019-07-27 RX ADMIN — Medication 100 MILLIGRAM(S): at 05:59

## 2019-07-27 RX ADMIN — Medication 25 MILLIGRAM(S): at 01:56

## 2019-07-27 RX ADMIN — Medication 81 MILLIGRAM(S): at 14:11

## 2019-07-27 RX ADMIN — Medication 250 MILLIGRAM(S): at 17:24

## 2019-07-27 RX ADMIN — Medication 250 MILLIGRAM(S): at 05:59

## 2019-07-27 RX ADMIN — Medication 100 MILLIGRAM(S): at 14:04

## 2019-07-27 NOTE — PROGRESS NOTE ADULT - ASSESSMENT
89y Male PMH Macular Degenerative Disease, AS s/p TAVR, GI bleed, HLD, Depression, BPH, EF 65-70% & Taoism, s/p L Femoral Shaft Fracture s/p IMN 5/2019, sent from [facility] for fever and persistent cough.  Per MD at [facility], pt had been treated for cough, started on Zosyn x 4 days with no improvement.  Noted fever today and transferred to ED for further evaluation.  He admits to generalized fatigue, denies SOB.  Noted in Afib / Aflutter with RVR, controlled with Cardizem and BBlocker.  RVP + for parainfluenza.  CXR showed worsening infiltrate.  SaO2 dropped to 88% on 50% VM and pt placed on high flow O2.  ICU consulted and pt transferred to Stepdown.  He responded to IV Abx.  Noted with hematuria and urinary retention, evaluated by urology and underwent cystoscopy.  Waddell placed over guidewire.         > Sepsis due to Complex Pneumonia / Parainfluenza infection / S/p Acute Hypoxic Respiratory Failure - On Ertapenem to complete 7 days as per ID. stop Linezolid,  no MRSA so far, unable to produce sputum Concern for gram positive and gram negative organisms.  Now afebrile.   Monitor SaO2.  Transfer to monitored bed with continuous SaO2.     >Coag negative staph in blood- contaminant, will not treat  > Afib with RVR - now in NSR.  Rate stable.  Cardiology followup appreciated.  On Metoprolol.  Holding anticoagulation due to hematuria.  > Urinary Retention / Hematuria -  input noted. Holding eliquis as hematuria continues.  H/H appears to be stable. Maintain waddell. Very difficult placement. Do not remove without urology permission.     > Hypokalemia - supplement lytes, repeat K.   > GERD - continue PPI  > Depression - continue escitalopram  > BPH - continue Flomax.  > Dementia - continue Memantine.  > Dysphagia - Remains on mechanical soft diet.    DVT Proph - add ICDs    Per prior hospitalist's discussion with son on 7/24, no advanced directives.

## 2019-07-27 NOTE — CHART NOTE - NSCHARTNOTEFT_GEN_A_CORE
Notified yesterday by Dr. Manfred Santiago pt's sputum culture at [facility] showed H.flu, essentially pansensitive.  Continued current antibiotics.

## 2019-07-28 LAB
ANION GAP SERPL CALC-SCNC: 11 MMOL/L — SIGNIFICANT CHANGE UP (ref 5–17)
BASOPHILS # BLD AUTO: 0.03 K/UL — SIGNIFICANT CHANGE UP (ref 0–0.2)
BASOPHILS NFR BLD AUTO: 0.5 % — SIGNIFICANT CHANGE UP (ref 0–2)
BUN SERPL-MCNC: 16 MG/DL — SIGNIFICANT CHANGE UP (ref 8–20)
CALCIUM SERPL-MCNC: 7.9 MG/DL — LOW (ref 8.6–10.2)
CHLORIDE SERPL-SCNC: 101 MMOL/L — SIGNIFICANT CHANGE UP (ref 98–107)
CO2 SERPL-SCNC: 25 MMOL/L — SIGNIFICANT CHANGE UP (ref 22–29)
CREAT SERPL-MCNC: 0.52 MG/DL — SIGNIFICANT CHANGE UP (ref 0.5–1.3)
EOSINOPHIL # BLD AUTO: 0.23 K/UL — SIGNIFICANT CHANGE UP (ref 0–0.5)
EOSINOPHIL NFR BLD AUTO: 3.5 % — SIGNIFICANT CHANGE UP (ref 0–6)
GLUCOSE SERPL-MCNC: 122 MG/DL — HIGH (ref 70–115)
HCT VFR BLD CALC: 32.3 % — LOW (ref 39–50)
HGB BLD-MCNC: 10.4 G/DL — LOW (ref 13–17)
IMM GRANULOCYTES NFR BLD AUTO: 1.7 % — HIGH (ref 0–1.5)
LYMPHOCYTES # BLD AUTO: 1.29 K/UL — SIGNIFICANT CHANGE UP (ref 1–3.3)
LYMPHOCYTES # BLD AUTO: 19.8 % — SIGNIFICANT CHANGE UP (ref 13–44)
MAGNESIUM SERPL-MCNC: 1.7 MG/DL — SIGNIFICANT CHANGE UP (ref 1.6–2.6)
MCHC RBC-ENTMCNC: 28.6 PG — SIGNIFICANT CHANGE UP (ref 27–34)
MCHC RBC-ENTMCNC: 32.2 GM/DL — SIGNIFICANT CHANGE UP (ref 32–36)
MCV RBC AUTO: 88.7 FL — SIGNIFICANT CHANGE UP (ref 80–100)
MONOCYTES # BLD AUTO: 0.53 K/UL — SIGNIFICANT CHANGE UP (ref 0–0.9)
MONOCYTES NFR BLD AUTO: 8.1 % — SIGNIFICANT CHANGE UP (ref 2–14)
NEUTROPHILS # BLD AUTO: 4.32 K/UL — SIGNIFICANT CHANGE UP (ref 1.8–7.4)
NEUTROPHILS NFR BLD AUTO: 66.4 % — SIGNIFICANT CHANGE UP (ref 43–77)
PHOSPHATE SERPL-MCNC: 2.8 MG/DL — SIGNIFICANT CHANGE UP (ref 2.4–4.7)
PLATELET # BLD AUTO: 245 K/UL — SIGNIFICANT CHANGE UP (ref 150–400)
POTASSIUM SERPL-MCNC: 3.4 MMOL/L — LOW (ref 3.5–5.3)
POTASSIUM SERPL-SCNC: 3.4 MMOL/L — LOW (ref 3.5–5.3)
RBC # BLD: 3.64 M/UL — LOW (ref 4.2–5.8)
RBC # FLD: 14.6 % — HIGH (ref 10.3–14.5)
SODIUM SERPL-SCNC: 137 MMOL/L — SIGNIFICANT CHANGE UP (ref 135–145)
WBC # BLD: 6.51 K/UL — SIGNIFICANT CHANGE UP (ref 3.8–10.5)
WBC # FLD AUTO: 6.51 K/UL — SIGNIFICANT CHANGE UP (ref 3.8–10.5)

## 2019-07-28 PROCEDURE — 99232 SBSQ HOSP IP/OBS MODERATE 35: CPT

## 2019-07-28 RX ORDER — CHLORHEXIDINE GLUCONATE 213 G/1000ML
1 SOLUTION TOPICAL DAILY
Refills: 0 | Status: DISCONTINUED | OUTPATIENT
Start: 2019-07-28 | End: 2019-07-31

## 2019-07-28 RX ORDER — POTASSIUM CHLORIDE 20 MEQ
40 PACKET (EA) ORAL ONCE
Refills: 0 | Status: COMPLETED | OUTPATIENT
Start: 2019-07-28 | End: 2019-07-28

## 2019-07-28 RX ORDER — APIXABAN 2.5 MG/1
2.5 TABLET, FILM COATED ORAL EVERY 12 HOURS
Refills: 0 | Status: DISCONTINUED | OUTPATIENT
Start: 2019-07-28 | End: 2019-07-29

## 2019-07-28 RX ORDER — MAGNESIUM SULFATE 500 MG/ML
1 VIAL (ML) INJECTION ONCE
Refills: 0 | Status: COMPLETED | OUTPATIENT
Start: 2019-07-28 | End: 2019-07-28

## 2019-07-28 RX ORDER — HYDRALAZINE HCL 50 MG
10 TABLET ORAL EVERY 6 HOURS
Refills: 0 | Status: DISCONTINUED | OUTPATIENT
Start: 2019-07-28 | End: 2019-07-31

## 2019-07-28 RX ADMIN — Medication 25 MILLIGRAM(S): at 06:32

## 2019-07-28 RX ADMIN — Medication 81 MILLIGRAM(S): at 11:08

## 2019-07-28 RX ADMIN — ERTAPENEM SODIUM 120 MILLIGRAM(S): 1 INJECTION, POWDER, LYOPHILIZED, FOR SOLUTION INTRAMUSCULAR; INTRAVENOUS at 13:41

## 2019-07-28 RX ADMIN — Medication 250 MILLIGRAM(S): at 17:12

## 2019-07-28 RX ADMIN — Medication 10 MILLIGRAM(S): at 13:40

## 2019-07-28 RX ADMIN — Medication 100 MILLIGRAM(S): at 23:18

## 2019-07-28 RX ADMIN — Medication 40 MILLIEQUIVALENT(S): at 13:40

## 2019-07-28 RX ADMIN — ESCITALOPRAM OXALATE 20 MILLIGRAM(S): 10 TABLET, FILM COATED ORAL at 11:09

## 2019-07-28 RX ADMIN — MEMANTINE HYDROCHLORIDE 10 MILLIGRAM(S): 10 TABLET ORAL at 11:08

## 2019-07-28 RX ADMIN — TAMSULOSIN HYDROCHLORIDE 0.8 MILLIGRAM(S): 0.4 CAPSULE ORAL at 23:18

## 2019-07-28 RX ADMIN — Medication 25 MILLIGRAM(S): at 01:39

## 2019-07-28 RX ADMIN — APIXABAN 2.5 MILLIGRAM(S): 2.5 TABLET, FILM COATED ORAL at 18:03

## 2019-07-28 RX ADMIN — Medication 25 MILLIGRAM(S): at 23:20

## 2019-07-28 RX ADMIN — Medication 100 MILLIGRAM(S): at 06:32

## 2019-07-28 RX ADMIN — Medication 25 MILLIGRAM(S): at 11:09

## 2019-07-28 RX ADMIN — Medication 100 GRAM(S): at 13:55

## 2019-07-28 RX ADMIN — CHLORHEXIDINE GLUCONATE 1 APPLICATION(S): 213 SOLUTION TOPICAL at 11:08

## 2019-07-28 RX ADMIN — Medication 250 MILLIGRAM(S): at 06:32

## 2019-07-28 RX ADMIN — Medication 25 MILLIGRAM(S): at 17:09

## 2019-07-28 NOTE — PROGRESS NOTE ADULT - ASSESSMENT
89y Male PMH Macular Degenerative Disease, AS s/p TAVR, GI bleed, HLD, Depression, BPH, EF 65-70% & Denominational, s/p L Femoral Shaft Fracture s/p IMN 5/2019, sent from [facility] for fever and persistent cough.  Per MD at [facility], pt had been treated for cough, started on Zosyn x 4 days with no improvement.  Noted fever today and transferred to ED for further evaluation.  He admits to generalized fatigue, denies SOB.  Noted in Afib / Aflutter with RVR, controlled with Cardizem and BBlocker.  RVP + for parainfluenza.  CXR showed worsening infiltrate.  SaO2 dropped to 88% on 50% VM and pt placed on high flow O2.  ICU consulted and pt transferred to Stepdown.  He responded to IV Abx.  Noted with hematuria and urinary retention, evaluated by urology and underwent cystoscopy.  Waddell placed over guidewire.         > Sepsis due to Complex Pneumonia / Parainfluenza infection / S/p Acute Hypoxic Respiratory Failure - On Ertapenem to complete 7 days as per ID. stop Linezolid,  no MRSA so far, unable to produce sputum Concern for gram positive and gram negative organisms.  Now afebrile.   Monitor SaO2.  Transfer to monitored bed with continuous SaO2.     >Coag negative staph in blood- contaminant, will not treat  > Afib with RVR - now in NSR.  Rate stable.  Cardiology followup appreciated.  On Metoprolol.  Holding anticoagulation due to hematuria. H/H stable  > Urinary Retention / Hematuria -  input noted. Holding eliquis as hematuria continues.  H/H appears to be stable. Maintain waddell. Very difficult placement. Do not remove without urology permission.     > Hypokalemia - supplement lytes, repeat K.   > GERD - continue PPI  > Depression - continue escitalopram  > BPH - continue Flomax.  > Dementia - continue Memantine.  > Dysphagia - Remains on mechanical soft diet.    DVT Proph - add ICDs    Per prior hospitalist's discussion with son on 7/24, no advanced directives.

## 2019-07-29 LAB
ANION GAP SERPL CALC-SCNC: 10 MMOL/L — SIGNIFICANT CHANGE UP (ref 5–17)
BASOPHILS # BLD AUTO: 0.03 K/UL — SIGNIFICANT CHANGE UP (ref 0–0.2)
BASOPHILS NFR BLD AUTO: 0.4 % — SIGNIFICANT CHANGE UP (ref 0–2)
BUN SERPL-MCNC: 14 MG/DL — SIGNIFICANT CHANGE UP (ref 8–20)
CALCIUM SERPL-MCNC: 7.8 MG/DL — LOW (ref 8.6–10.2)
CHLORIDE SERPL-SCNC: 101 MMOL/L — SIGNIFICANT CHANGE UP (ref 98–107)
CO2 SERPL-SCNC: 28 MMOL/L — SIGNIFICANT CHANGE UP (ref 22–29)
CREAT SERPL-MCNC: 0.56 MG/DL — SIGNIFICANT CHANGE UP (ref 0.5–1.3)
CULTURE RESULTS: SIGNIFICANT CHANGE UP
CULTURE RESULTS: SIGNIFICANT CHANGE UP
EOSINOPHIL # BLD AUTO: 0.31 K/UL — SIGNIFICANT CHANGE UP (ref 0–0.5)
EOSINOPHIL NFR BLD AUTO: 4.4 % — SIGNIFICANT CHANGE UP (ref 0–6)
GLUCOSE SERPL-MCNC: 126 MG/DL — HIGH (ref 70–115)
HCT VFR BLD CALC: 29.9 % — LOW (ref 39–50)
HCT VFR BLD CALC: 32.7 % — LOW (ref 39–50)
HGB BLD-MCNC: 10.6 G/DL — LOW (ref 13–17)
HGB BLD-MCNC: 9.7 G/DL — LOW (ref 13–17)
IMM GRANULOCYTES NFR BLD AUTO: 1.6 % — HIGH (ref 0–1.5)
LYMPHOCYTES # BLD AUTO: 1.23 K/UL — SIGNIFICANT CHANGE UP (ref 1–3.3)
LYMPHOCYTES # BLD AUTO: 17.5 % — SIGNIFICANT CHANGE UP (ref 13–44)
MAGNESIUM SERPL-MCNC: 1.9 MG/DL — SIGNIFICANT CHANGE UP (ref 1.6–2.6)
MCHC RBC-ENTMCNC: 28.5 PG — SIGNIFICANT CHANGE UP (ref 27–34)
MCHC RBC-ENTMCNC: 29 PG — SIGNIFICANT CHANGE UP (ref 27–34)
MCHC RBC-ENTMCNC: 32.4 GM/DL — SIGNIFICANT CHANGE UP (ref 32–36)
MCHC RBC-ENTMCNC: 32.4 GM/DL — SIGNIFICANT CHANGE UP (ref 32–36)
MCV RBC AUTO: 87.9 FL — SIGNIFICANT CHANGE UP (ref 80–100)
MCV RBC AUTO: 89.3 FL — SIGNIFICANT CHANGE UP (ref 80–100)
MONOCYTES # BLD AUTO: 0.44 K/UL — SIGNIFICANT CHANGE UP (ref 0–0.9)
MONOCYTES NFR BLD AUTO: 6.3 % — SIGNIFICANT CHANGE UP (ref 2–14)
NEUTROPHILS # BLD AUTO: 4.9 K/UL — SIGNIFICANT CHANGE UP (ref 1.8–7.4)
NEUTROPHILS NFR BLD AUTO: 69.8 % — SIGNIFICANT CHANGE UP (ref 43–77)
ORGANISM # SPEC MICROSCOPIC CNT: SIGNIFICANT CHANGE UP
PHOSPHATE SERPL-MCNC: 2.8 MG/DL — SIGNIFICANT CHANGE UP (ref 2.4–4.7)
PLATELET # BLD AUTO: 253 K/UL — SIGNIFICANT CHANGE UP (ref 150–400)
PLATELET # BLD AUTO: 276 K/UL — SIGNIFICANT CHANGE UP (ref 150–400)
POTASSIUM SERPL-MCNC: 3.6 MMOL/L — SIGNIFICANT CHANGE UP (ref 3.5–5.3)
POTASSIUM SERPL-SCNC: 3.6 MMOL/L — SIGNIFICANT CHANGE UP (ref 3.5–5.3)
RBC # BLD: 3.4 M/UL — LOW (ref 4.2–5.8)
RBC # BLD: 3.66 M/UL — LOW (ref 4.2–5.8)
RBC # FLD: 14.6 % — HIGH (ref 10.3–14.5)
RBC # FLD: 14.6 % — HIGH (ref 10.3–14.5)
SODIUM SERPL-SCNC: 139 MMOL/L — SIGNIFICANT CHANGE UP (ref 135–145)
SPECIMEN SOURCE: SIGNIFICANT CHANGE UP
SPECIMEN SOURCE: SIGNIFICANT CHANGE UP
WBC # BLD: 7.02 K/UL — SIGNIFICANT CHANGE UP (ref 3.8–10.5)
WBC # BLD: 7.4 K/UL — SIGNIFICANT CHANGE UP (ref 3.8–10.5)
WBC # FLD AUTO: 7.02 K/UL — SIGNIFICANT CHANGE UP (ref 3.8–10.5)
WBC # FLD AUTO: 7.4 K/UL — SIGNIFICANT CHANGE UP (ref 3.8–10.5)

## 2019-07-29 PROCEDURE — 99232 SBSQ HOSP IP/OBS MODERATE 35: CPT

## 2019-07-29 RX ORDER — HYDRALAZINE HCL 50 MG
25 TABLET ORAL EVERY 8 HOURS
Refills: 0 | Status: DISCONTINUED | OUTPATIENT
Start: 2019-07-29 | End: 2019-07-30

## 2019-07-29 RX ADMIN — Medication 100 MILLIGRAM(S): at 23:01

## 2019-07-29 RX ADMIN — Medication 250 MILLIGRAM(S): at 17:39

## 2019-07-29 RX ADMIN — Medication 100 MILLIGRAM(S): at 06:29

## 2019-07-29 RX ADMIN — MEMANTINE HYDROCHLORIDE 10 MILLIGRAM(S): 10 TABLET ORAL at 11:43

## 2019-07-29 RX ADMIN — APIXABAN 2.5 MILLIGRAM(S): 2.5 TABLET, FILM COATED ORAL at 06:29

## 2019-07-29 RX ADMIN — Medication 25 MILLIGRAM(S): at 23:03

## 2019-07-29 RX ADMIN — Medication 25 MILLIGRAM(S): at 17:39

## 2019-07-29 RX ADMIN — Medication 250 MILLIGRAM(S): at 06:29

## 2019-07-29 RX ADMIN — TAMSULOSIN HYDROCHLORIDE 0.8 MILLIGRAM(S): 0.4 CAPSULE ORAL at 23:03

## 2019-07-29 RX ADMIN — CHLORHEXIDINE GLUCONATE 1 APPLICATION(S): 213 SOLUTION TOPICAL at 11:50

## 2019-07-29 RX ADMIN — Medication 81 MILLIGRAM(S): at 11:44

## 2019-07-29 RX ADMIN — Medication 25 MILLIGRAM(S): at 11:49

## 2019-07-29 RX ADMIN — Medication 25 MILLIGRAM(S): at 23:02

## 2019-07-29 RX ADMIN — Medication 25 MILLIGRAM(S): at 16:27

## 2019-07-29 RX ADMIN — ESCITALOPRAM OXALATE 20 MILLIGRAM(S): 10 TABLET, FILM COATED ORAL at 11:44

## 2019-07-29 RX ADMIN — Medication 25 MILLIGRAM(S): at 06:29

## 2019-07-29 RX ADMIN — ERTAPENEM SODIUM 120 MILLIGRAM(S): 1 INJECTION, POWDER, LYOPHILIZED, FOR SOLUTION INTRAMUSCULAR; INTRAVENOUS at 12:52

## 2019-07-29 NOTE — PROGRESS NOTE ADULT - ASSESSMENT
89y Male PMH Macular Degenerative Disease, AS s/p TAVR, GI bleed, HLD, Depression, BPH, EF 65-70% & Lutheran, s/p L Femoral Shaft Fracture s/p IMN 5/2019, sent from [facility]) for fever and persistent cough.        RVP + for parainfluenza 3  multifocal PNA on CXR    Positive blood cultures    - CXR with "Large right upper and left lower lobe pneumonia. Follow-up to complete resolution."    - Blood culturee 1 of 4 bottles with CoNS  - CoNS likely contamination   - Repeat blood cultures no growth  - continue Ertapenem for a 7 day course. last day tomorrow  - Trend Fever  - Trend Leukocytosis      Will Sign off. Please call PRN.

## 2019-07-29 NOTE — DIETITIAN INITIAL EVALUATION ADULT. - PERTINENT LABORATORY DATA
07-29 Na139 mmol/L Glu 126 mg/dL<H> K+ 3.6 mmol/L Cr  0.56 mg/dL BUN 14.0 mg/dL Phos 2.8 mg/dL Alb n/a   PAB n/a

## 2019-07-29 NOTE — PROGRESS NOTE ADULT - ASSESSMENT
89y Male PMH Macular Degenerative Disease, AS s/p TAVR, GI bleed, HLD, Depression, BPH, EF 65-70% & Jainism, s/p L Femoral Shaft Fracture s/p IMN 5/2019, sent from [facility] for fever and persistent cough.  Per MD at [facility], pt had been treated for cough, started on Zosyn x 4 days with no improvement.  Noted fever today and transferred to ED for further evaluation.  He admits to generalized fatigue, denies SOB.  Noted in Afib / Aflutter with RVR, controlled with Cardizem and BBlocker.  RVP + for parainfluenza.  CXR showed worsening infiltrate.  SaO2 dropped to 88% on 50% VM and pt placed on high flow O2.  ICU consulted and pt transferred to Stepdown.  He responded to IV Abx, clinically improved and downgraded to medical floor.  Noted with hematuria and urinary retention, evaluated by urology and underwent cystoscopy.  Milian placed over guidewire.  Pt had new onset a flutter w RvR and was started on Eliquis by cardiology once hematuria resolved. Now discontinued Eliquis due to recurrent hematuria.           > Sepsis due to Complex Pneumonia / Parainfluenza infection / S/p Acute Hypoxic Respiratory Failure - On Ertapenem through 7/30/19 to complete 7 days as per ID.  Now afebrile.   Monitor SaO2. Pt has no oxygen at baseline, will attempt to wean down.     >Coag negative staph in blood- contaminant, will not treat  > Afib with RVR - now in NSR.  Rate stable.  Cardiology followup appreciated.  On Metoprolol.  Was started back on Eliquis yesterday when urine cleared. Now w recurrent hematuria. Holding anticoagulation due to hematuria. H/H stable. Continue to closely monitor H & H. PT IS Sabianism. NO BLOOD PRODUCTS.    > Urinary Retention / Hematuria -  input noted. Holding eliquis as hematuria continues.  H/H appears to be stable. Maintain milian. Very difficult placement. Do not remove without urology permission. Discussed recurrent hematuria w Dr Mckeon and Dr Mckeon today 7/29/19. Dr Mckeon reiterated that MILIAN SHOULD NOT BE REMOVED. Urology will see patient again tomorrow am. Cardiology had signed off, I recalled them for input on anticoagulation in setting of hematuria. Now recommending ASA only.   > Hypokalemia - now resolved, follow up am labs.   > GERD - continue PPI  > Depression - continue escitalopram  > BPH - continue Flomax.  > Dementia - continue Memantine.  > Dysphagia - Remains on mechanical soft diet.    DVT Proph - add ICDs    Per prior hospitalist's discussion with son on 7/24, no advanced directives.    Long discussion w patient's son in regard to plan of treatment. Explained that if Hg remains stable will likely discharge tomorrow. Cardiology follow up appreciated, now recommending ASA only as pt had recurrent hematuria on Eliquis. Urology to see patient in am to follow up on hematuria, difficult Milian placed by urologist. As per urology should not be removed.

## 2019-07-29 NOTE — DIETITIAN INITIAL EVALUATION ADULT. - MALNUTRITION
decreased po, weight loss over last year, on [facility] soft diet, mild muscle wasting moderate chronic

## 2019-07-29 NOTE — DIETITIAN INITIAL EVALUATION ADULT. - OTHER INFO
89y Male PMH Macular Degenerative Disease, AS s/p TAVR, GI bleed, HLD, Depression, BPH, EF 65-70% & Presybeterian, s/p L Femoral Shaft Fracture s/p IMN 5/2019, sent from [facility] for fever and persistent cough.  Per MD at [facility], pt had been treated for cough, started on Zosyn x 4 days with no improvement.  RVP + for parainfluenza.  CXR showed worsening infiltrate. Pt taking [facility] soft diet fair, seen by [facility] Pt with 20# weight loss in 1 1/2 years. Mild depletion in temples, clavicles.

## 2019-07-29 NOTE — PROGRESS NOTE ADULT - ATTENDING COMMENTS
I have personally seen and examined patient.  Above note reviewed and discussed with PA, modified where appropriate.    PHYSICAL EXAM:   GENERAL: NAD  HEAD:  Atraumatic, Normocephalic  EYES: EOMI, PERRLA, conjunctiva and sclera clear  ENMT: Moist mucous membranes  NECK: Supple, No JVD  NERVOUS SYSTEM:  Alert & Oriented X2, slightly confused, non focal  CHEST/LUNG: Clear to auscultation bilaterally; No rales, rhonchi, wheezing, or rubs  HEART: Regular rate and rhythm; No murmurs, rubs, or gallops  ABDOMEN: Soft, Nontender, Nondistended; Bowel sounds present, Yu draining blood tinged urine w small clots.   EXTREMITIES:  2+ Peripheral Pulses, No clubbing, cyanosis, or edema

## 2019-07-29 NOTE — CHART NOTE - NSCHARTNOTEFT_GEN_A_CORE
Upon Nutritional Assessment by the Registered Dietitian your patient was determined to meet criteria / has evidence of the following diagnosis/diagnoses:          [ ]  Mild Protein Calorie Malnutrition        [ x]  Moderate Protein Calorie Malnutrition  CHRONIC        [ ] Severe Protein Calorie Malnutrition        [ ] Unspecified Protein Calorie Malnutrition        [ ] Underweight / BMI <19        [ ] Morbid Obesity / BMI > 40      Findings as based on:  •  Comprehensive nutrition assessment and consultation  •  Calorie counts (nutrient intake analysis)  •  Food acceptance and intake status from observations by staff  •  Follow up  •  Patient education  •  Intervention secondary to interdisciplinary rounds  •   concerns      Treatment:    The following diet has been recommended: Rec Ensure BID      PROVIDER Section:     By signing this assessment you are acknowledging and agree with the diagnosis/diagnoses assigned by the Registered Dietitian    Comments:

## 2019-07-29 NOTE — DIETITIAN INITIAL EVALUATION ADULT. - ETIOLOGY
related to increased nutrient needs and inadequate energy intake in setting of debility at [facility], has +flu

## 2019-07-30 LAB
ANION GAP SERPL CALC-SCNC: 11 MMOL/L — SIGNIFICANT CHANGE UP (ref 5–17)
BASE EXCESS BLDA CALC-SCNC: 6.8 MMOL/L — HIGH (ref -2–2)
BASOPHILS # BLD AUTO: 0.03 K/UL — SIGNIFICANT CHANGE UP (ref 0–0.2)
BASOPHILS NFR BLD AUTO: 0.4 % — SIGNIFICANT CHANGE UP (ref 0–2)
BLOOD GAS COMMENTS ARTERIAL: SIGNIFICANT CHANGE UP
BUN SERPL-MCNC: 12 MG/DL — SIGNIFICANT CHANGE UP (ref 8–20)
CALCIUM SERPL-MCNC: 8 MG/DL — LOW (ref 8.6–10.2)
CHLORIDE SERPL-SCNC: 98 MMOL/L — SIGNIFICANT CHANGE UP (ref 98–107)
CO2 SERPL-SCNC: 27 MMOL/L — SIGNIFICANT CHANGE UP (ref 22–29)
CREAT SERPL-MCNC: 0.49 MG/DL — LOW (ref 0.5–1.3)
CULTURE RESULTS: SIGNIFICANT CHANGE UP
CULTURE RESULTS: SIGNIFICANT CHANGE UP
EOSINOPHIL # BLD AUTO: 0.35 K/UL — SIGNIFICANT CHANGE UP (ref 0–0.5)
EOSINOPHIL NFR BLD AUTO: 4.9 % — SIGNIFICANT CHANGE UP (ref 0–6)
GAS PNL BLDA: SIGNIFICANT CHANGE UP
GLUCOSE SERPL-MCNC: 111 MG/DL — SIGNIFICANT CHANGE UP (ref 70–115)
HCO3 BLDA-SCNC: 30 MMOL/L — HIGH (ref 20–26)
HCT VFR BLD CALC: 32.2 % — LOW (ref 39–50)
HGB BLD-MCNC: 10.4 G/DL — LOW (ref 13–17)
HOROWITZ INDEX BLDA+IHG-RTO: SIGNIFICANT CHANGE UP
IMM GRANULOCYTES NFR BLD AUTO: 1.7 % — HIGH (ref 0–1.5)
LYMPHOCYTES # BLD AUTO: 1.24 K/UL — SIGNIFICANT CHANGE UP (ref 1–3.3)
LYMPHOCYTES # BLD AUTO: 17.2 % — SIGNIFICANT CHANGE UP (ref 13–44)
MAGNESIUM SERPL-MCNC: 1.7 MG/DL — SIGNIFICANT CHANGE UP (ref 1.6–2.6)
MCHC RBC-ENTMCNC: 28.6 PG — SIGNIFICANT CHANGE UP (ref 27–34)
MCHC RBC-ENTMCNC: 32.3 GM/DL — SIGNIFICANT CHANGE UP (ref 32–36)
MCV RBC AUTO: 88.5 FL — SIGNIFICANT CHANGE UP (ref 80–100)
MONOCYTES # BLD AUTO: 0.43 K/UL — SIGNIFICANT CHANGE UP (ref 0–0.9)
MONOCYTES NFR BLD AUTO: 6 % — SIGNIFICANT CHANGE UP (ref 2–14)
NEUTROPHILS # BLD AUTO: 5.03 K/UL — SIGNIFICANT CHANGE UP (ref 1.8–7.4)
NEUTROPHILS NFR BLD AUTO: 69.8 % — SIGNIFICANT CHANGE UP (ref 43–77)
PCO2 BLDA: 38 MMHG — SIGNIFICANT CHANGE UP (ref 35–45)
PH BLDA: 7.51 — HIGH (ref 7.35–7.45)
PHOSPHATE SERPL-MCNC: 2.9 MG/DL — SIGNIFICANT CHANGE UP (ref 2.4–4.7)
PLATELET # BLD AUTO: 291 K/UL — SIGNIFICANT CHANGE UP (ref 150–400)
PO2 BLDA: 59 MMHG — LOW (ref 83–108)
POTASSIUM SERPL-MCNC: 3.9 MMOL/L — SIGNIFICANT CHANGE UP (ref 3.5–5.3)
POTASSIUM SERPL-SCNC: 3.9 MMOL/L — SIGNIFICANT CHANGE UP (ref 3.5–5.3)
RBC # BLD: 3.64 M/UL — LOW (ref 4.2–5.8)
RBC # FLD: 14.3 % — SIGNIFICANT CHANGE UP (ref 10.3–14.5)
SAO2 % BLDA: 92 % — LOW (ref 95–99)
SODIUM SERPL-SCNC: 136 MMOL/L — SIGNIFICANT CHANGE UP (ref 135–145)
SPECIMEN SOURCE: SIGNIFICANT CHANGE UP
SPECIMEN SOURCE: SIGNIFICANT CHANGE UP
WBC # BLD: 7.2 K/UL — SIGNIFICANT CHANGE UP (ref 3.8–10.5)
WBC # FLD AUTO: 7.2 K/UL — SIGNIFICANT CHANGE UP (ref 3.8–10.5)

## 2019-07-30 PROCEDURE — 99232 SBSQ HOSP IP/OBS MODERATE 35: CPT

## 2019-07-30 PROCEDURE — 71045 X-RAY EXAM CHEST 1 VIEW: CPT | Mod: 26

## 2019-07-30 RX ORDER — HYDRALAZINE HCL 50 MG
100 TABLET ORAL EVERY 8 HOURS
Refills: 0 | Status: DISCONTINUED | OUTPATIENT
Start: 2019-07-30 | End: 2019-07-31

## 2019-07-30 RX ORDER — HYDRALAZINE HCL 50 MG
50 TABLET ORAL EVERY 8 HOURS
Refills: 0 | Status: DISCONTINUED | OUTPATIENT
Start: 2019-07-30 | End: 2019-07-30

## 2019-07-30 RX ADMIN — ERTAPENEM SODIUM 120 MILLIGRAM(S): 1 INJECTION, POWDER, LYOPHILIZED, FOR SOLUTION INTRAMUSCULAR; INTRAVENOUS at 15:22

## 2019-07-30 RX ADMIN — Medication 81 MILLIGRAM(S): at 11:33

## 2019-07-30 RX ADMIN — TAMSULOSIN HYDROCHLORIDE 0.8 MILLIGRAM(S): 0.4 CAPSULE ORAL at 23:21

## 2019-07-30 RX ADMIN — Medication 100 MILLIGRAM(S): at 23:21

## 2019-07-30 RX ADMIN — ESCITALOPRAM OXALATE 20 MILLIGRAM(S): 10 TABLET, FILM COATED ORAL at 11:33

## 2019-07-30 RX ADMIN — Medication 50 MILLIGRAM(S): at 15:33

## 2019-07-30 RX ADMIN — Medication 25 MILLIGRAM(S): at 11:33

## 2019-07-30 RX ADMIN — MEMANTINE HYDROCHLORIDE 10 MILLIGRAM(S): 10 TABLET ORAL at 11:33

## 2019-07-30 RX ADMIN — Medication 25 MILLIGRAM(S): at 05:24

## 2019-07-30 RX ADMIN — CHLORHEXIDINE GLUCONATE 1 APPLICATION(S): 213 SOLUTION TOPICAL at 11:33

## 2019-07-30 RX ADMIN — Medication 100 MILLIGRAM(S): at 15:22

## 2019-07-30 RX ADMIN — Medication 25 MILLIGRAM(S): at 23:21

## 2019-07-30 RX ADMIN — Medication 100 MILLIGRAM(S): at 05:24

## 2019-07-30 RX ADMIN — Medication 10 MILLIGRAM(S): at 16:49

## 2019-07-30 RX ADMIN — Medication 25 MILLIGRAM(S): at 19:16

## 2019-07-30 RX ADMIN — Medication 250 MILLIGRAM(S): at 05:24

## 2019-07-30 RX ADMIN — Medication 10 MILLIGRAM(S): at 11:33

## 2019-07-30 RX ADMIN — Medication 250 MILLIGRAM(S): at 19:16

## 2019-07-30 NOTE — PROGRESS NOTE ADULT - PROBLEM SELECTOR PLAN 1
Maintain waddell. Patient to be discharged with waddell and will perform trial of void in office    Continue flomax

## 2019-07-30 NOTE — PROGRESS NOTE ADULT - ASSESSMENT
89y Male PMH Macular Degenerative Disease, AS s/p TAVR, GI bleed, HLD, Depression, BPH, EF 65-70% & Buddhist, s/p L Femoral Shaft Fracture s/p IMN 5/2019, sent from [facility] for fever and persistent cough.  Per MD at [facility], pt had been treated for cough, started on Zosyn x 4 days with no improvement.  Noted fever today and transferred to ED for further evaluation.  He admits to generalized fatigue, denies SOB.  Noted in Afib / Aflutter with RVR, controlled with Cardizem and BBlocker.  RVP + for parainfluenza.  CXR showed worsening infiltrate.  SaO2 dropped to 88% on 50% VM and pt placed on high flow O2.  ICU consulted and pt transferred to Stepdown.  He responded to IV Abx, clinically improved and downgraded to medical floor.  Noted with hematuria and urinary retention, evaluated by urology and underwent cystoscopy.  Milian placed over guidewire.  Pt had new onset a flutter w RvR and was started on Eliquis by cardiology once hematuria resolved. Now discontinued Eliquis due to recurrent hematuria.           > Sepsis due to Complex Pneumonia / Parainfluenza infection / S/p Acute Hypoxic Respiratory Failure - On Ertapenem through 7/30/19 to be completed today as per ID.  Now afebrile.   Monitor SaO2. Pt has no oxygen at baseline, will attempt to wean down. Pt mouth breathing today with desats to 70's on NC. When nose breathes sats go up. Will monitor inhouse tonight. f/u repeat CXR & ABG   >Coag negative staph in blood- contaminant, will not treat  > Afib with RVR - now in NSR.  Rate stable.  Cardiology followup appreciated.  On Metoprolol.  Was started back on Eliquis yesterday when urine cleared. Now w recurrent hematuria. Holding anticoagulation due to hematuria. H/H stable. Continue to closely monitor H & H. PT IS Restorationism. NO BLOOD PRODUCTS.    > Urinary Retention / Hematuria -  input noted. Holding eliquis as hematuria continues.  H/H appears to be stable. Maintain milian. Very difficult placement. Do not remove without urology permission. Discussed recurrent hematuria w Dr Mckeon and Dr Mckeon today 7/29/19. Dr Mckeon reiterated that IMLIAN SHOULD NOT BE REMOVED. Urology will see patient again tomorrow am. Cardiology had signed off, I recalled them for input on anticoagulation in setting of hematuria. Now recommending ASA only. ASA started. H/H stable. Hematuria improving  > Hypokalemia - now resolved, follow up am labs.   > GERD - continue PPI  > Depression - continue escitalopram  > BPH - continue Flomax.  > Dementia - continue Memantine.  > Dysphagia - Remains on mechanical soft diet.    DVT Proph - add ICDs    Per prior hospitalist's discussion with son on 7/24, no advanced directives.    Long discussion w patient's son in regard to plan of treatment. Explained that if Hg remains stable will likely discharge tomorrow. Cardiology follow up appreciated, now recommending ASA only as pt had recurrent hematuria on Eliquis. Urology to see patient in am to follow up on hematuria, difficult Milian placed by urologist. As per urology should not be removed.

## 2019-07-30 NOTE — PROGRESS NOTE ADULT - ASSESSMENT
1. 89 yo male transferred from [facility] with rapid aflutter-converted to SR. Given age and bleeding hx-avoid systemic AC  2. PNA-under treatment.  3. TAVR  4. hematuria-improving.

## 2019-07-30 NOTE — PROVIDER CONTACT NOTE (OTHER) - SITUATION
Pt bp elevated 179/74 , Cardizem 10 mg iv push was given at 1133 this med not due yet, Hydralazine order is active called md to clarify she wants it to be given.

## 2019-07-31 ENCOUNTER — TRANSCRIPTION ENCOUNTER (OUTPATIENT)
Age: 84
End: 2019-07-31

## 2019-07-31 VITALS — RESPIRATION RATE: 18 BRPM | OXYGEN SATURATION: 92 %

## 2019-07-31 LAB
ANION GAP SERPL CALC-SCNC: 11 MMOL/L — SIGNIFICANT CHANGE UP (ref 5–17)
BASOPHILS # BLD AUTO: 0.03 K/UL — SIGNIFICANT CHANGE UP (ref 0–0.2)
BASOPHILS NFR BLD AUTO: 0.5 % — SIGNIFICANT CHANGE UP (ref 0–2)
BUN SERPL-MCNC: 10 MG/DL — SIGNIFICANT CHANGE UP (ref 8–20)
CALCIUM SERPL-MCNC: 8.1 MG/DL — LOW (ref 8.6–10.2)
CHLORIDE SERPL-SCNC: 97 MMOL/L — LOW (ref 98–107)
CO2 SERPL-SCNC: 27 MMOL/L — SIGNIFICANT CHANGE UP (ref 22–29)
CREAT SERPL-MCNC: 0.56 MG/DL — SIGNIFICANT CHANGE UP (ref 0.5–1.3)
EOSINOPHIL # BLD AUTO: 0.27 K/UL — SIGNIFICANT CHANGE UP (ref 0–0.5)
EOSINOPHIL NFR BLD AUTO: 4.4 % — SIGNIFICANT CHANGE UP (ref 0–6)
GLUCOSE SERPL-MCNC: 130 MG/DL — HIGH (ref 70–115)
HCT VFR BLD CALC: 32.9 % — LOW (ref 39–50)
HGB BLD-MCNC: 10.8 G/DL — LOW (ref 13–17)
IMM GRANULOCYTES NFR BLD AUTO: 0.8 % — SIGNIFICANT CHANGE UP (ref 0–1.5)
LYMPHOCYTES # BLD AUTO: 1.04 K/UL — SIGNIFICANT CHANGE UP (ref 1–3.3)
LYMPHOCYTES # BLD AUTO: 17.1 % — SIGNIFICANT CHANGE UP (ref 13–44)
MAGNESIUM SERPL-MCNC: 1.7 MG/DL — SIGNIFICANT CHANGE UP (ref 1.6–2.6)
MCHC RBC-ENTMCNC: 28.8 PG — SIGNIFICANT CHANGE UP (ref 27–34)
MCHC RBC-ENTMCNC: 32.8 GM/DL — SIGNIFICANT CHANGE UP (ref 32–36)
MCV RBC AUTO: 87.7 FL — SIGNIFICANT CHANGE UP (ref 80–100)
MONOCYTES # BLD AUTO: 0.41 K/UL — SIGNIFICANT CHANGE UP (ref 0–0.9)
MONOCYTES NFR BLD AUTO: 6.7 % — SIGNIFICANT CHANGE UP (ref 2–14)
NEUTROPHILS # BLD AUTO: 4.28 K/UL — SIGNIFICANT CHANGE UP (ref 1.8–7.4)
NEUTROPHILS NFR BLD AUTO: 70.5 % — SIGNIFICANT CHANGE UP (ref 43–77)
PHOSPHATE SERPL-MCNC: 3.1 MG/DL — SIGNIFICANT CHANGE UP (ref 2.4–4.7)
PLATELET # BLD AUTO: 327 K/UL — SIGNIFICANT CHANGE UP (ref 150–400)
POTASSIUM SERPL-MCNC: 3.8 MMOL/L — SIGNIFICANT CHANGE UP (ref 3.5–5.3)
POTASSIUM SERPL-SCNC: 3.8 MMOL/L — SIGNIFICANT CHANGE UP (ref 3.5–5.3)
RBC # BLD: 3.75 M/UL — LOW (ref 4.2–5.8)
RBC # FLD: 14.5 % — SIGNIFICANT CHANGE UP (ref 10.3–14.5)
SODIUM SERPL-SCNC: 135 MMOL/L — SIGNIFICANT CHANGE UP (ref 135–145)
WBC # BLD: 6.08 K/UL — SIGNIFICANT CHANGE UP (ref 3.8–10.5)
WBC # FLD AUTO: 6.08 K/UL — SIGNIFICANT CHANGE UP (ref 3.8–10.5)

## 2019-07-31 PROCEDURE — 99239 HOSP IP/OBS DSCHRG MGMT >30: CPT

## 2019-07-31 RX ORDER — HYDRALAZINE HCL 50 MG
1 TABLET ORAL
Qty: 0 | Refills: 0 | DISCHARGE
Start: 2019-07-31

## 2019-07-31 RX ADMIN — Medication 25 MILLIGRAM(S): at 12:16

## 2019-07-31 RX ADMIN — Medication 100 MILLIGRAM(S): at 05:15

## 2019-07-31 RX ADMIN — Medication 250 MILLIGRAM(S): at 17:03

## 2019-07-31 RX ADMIN — CHLORHEXIDINE GLUCONATE 1 APPLICATION(S): 213 SOLUTION TOPICAL at 12:16

## 2019-07-31 RX ADMIN — Medication 25 MILLIGRAM(S): at 17:03

## 2019-07-31 RX ADMIN — Medication 100 MILLIGRAM(S): at 14:30

## 2019-07-31 RX ADMIN — Medication 25 MILLIGRAM(S): at 05:14

## 2019-07-31 RX ADMIN — Medication 250 MILLIGRAM(S): at 05:15

## 2019-07-31 RX ADMIN — Medication 10 MILLIGRAM(S): at 00:39

## 2019-07-31 RX ADMIN — Medication 100 MILLIGRAM(S): at 14:29

## 2019-07-31 RX ADMIN — ESCITALOPRAM OXALATE 20 MILLIGRAM(S): 10 TABLET, FILM COATED ORAL at 12:16

## 2019-07-31 RX ADMIN — MEMANTINE HYDROCHLORIDE 10 MILLIGRAM(S): 10 TABLET ORAL at 12:16

## 2019-07-31 RX ADMIN — Medication 81 MILLIGRAM(S): at 12:16

## 2019-07-31 NOTE — DISCHARGE NOTE NURSING/CASE MANAGEMENT/SOCIAL WORK - NSDCDPATPORTLINK_GEN_ALL_CORE
You can access the [facility], offered by [facility], by registering with the following website: [email or web address]

## 2019-07-31 NOTE — DISCHARGE NOTE PROVIDER - HOSPITAL COURSE
89y Male PMH Macular Degenerative Disease, AS s/p TAVR, GI bleed, HLD, Depression, BPH, EF 65-70% & Shinto, s/p L Femoral Shaft Fracture s/p IMN 5/2019, sent from [facility] for fever and persistent cough.  Per MD at [facility], pt had been treated for cough, started on Zosyn x 4 days with no improvement.  Noted fever today and transferred to ED for further evaluation.  He admits to generalized fatigue, denies SOB.  In ED, patient noted with new onsent Afib / Aflutter with RVR, controlled with Cardizem and BBlocker. I RVP + for parainfluenza.  CXR showed worsening infiltrate.  SaO2 dropped to 88% on 50% VM and pt placed on high flow O2.  ICU consulted and pt transferred to Stepdown.  He responded to IV Abx, clinically improved and downgraded to medical floor.  Noted with hematuria and urinary retention, evaluated by urology and underwent cystoscopy.  Yu placed over guidewire. Pt was initally started on eliquis after hematuria resolved however later with recurrent hematuria and decision made to continue low dose aspirin instead of Eliquis.          Vital Signs Last 24 Hrs    T(C): 36.8 (30 Jul 2019 23:17), Max: 36.8 (30 Jul 2019 23:17)    T(F): 98.3 (30 Jul 2019 23:17), Max: 98.3 (30 Jul 2019 23:17)    HR: 82 (31 Jul 2019 05:12) (65 - 85)    BP: 142/65 (31 Jul 2019 05:12) (142/65 - 179/74)    BP(mean): --    RR: 17 (31 Jul 2019 05:12) (17 - 18)    SpO2: 96% (31 Jul 2019 05:12) (91% - 98%)        PHYSICAL EXAM:     GENERAL: NAD    HEAD:  Atraumatic, Normocephalic    EYES: EOMI, PERRLA, conjunctiva and sclera clear    ENMT: Moist mucous membranes    NECK: Supple, No JVD    NERVOUS SYSTEM:  Alert & Oriented X2, slightly confused, non focal    CHEST/LUNG: Clear to auscultation bilaterally; No rales, rhonchi, wheezing, or rubs    HEART: Regular rate and rhythm; No murmurs, rubs, or gallops    ABDOMEN: Soft, Nontender, Nondistended; Bowel sounds present, Yu draining blood tinged urine w small clots.     EXTREMITIES:  2+ Peripheral Pulses, No clubbing, cyanosis, or edema        A/P: DC to [facility] 89y Male PMH Macular Degenerative Disease, AS s/p TAVR, GI bleed, HLD, Depression, BPH, EF 65-70% & Judaism, s/p L Femoral Shaft Fracture s/p IMN 5/2019, sent from [facility] for fever and persistent cough.  Per MD at [facility], pt had been treated for cough, started on Zosyn x 4 days with no improvement.  Noted fever today and transferred to ED for further evaluation.  He admits to generalized fatigue, denies SOB.  In ED, patient noted with new onsent Afib / Aflutter with RVR, controlled with Cardizem and BBlocker. I RVP + for parainfluenza.  CXR showed worsening infiltrate.  SaO2 dropped to 88% on 50% VM and pt placed on high flow O2.  ICU consulted and pt transferred to Stepdown.  He responded to IV Abx, clinically improved and downgraded to medical floor.  Noted with hematuria and urinary retention, evaluated by urology and underwent cystoscopy.  Yu placed over guidewire. Pt was initally started on eliquis after hematuria resolved however patient later developed recurrent hematuria and decision was made to continue low dose aspirin instead of Eliquis.  H/H now stable. Pt to be discharged to [facility].         Vital Signs Last 24 Hrs    T(C): 36.8 (30 Jul 2019 23:17), Max: 36.8 (30 Jul 2019 23:17)    T(F): 98.3 (30 Jul 2019 23:17), Max: 98.3 (30 Jul 2019 23:17)    HR: 82 (31 Jul 2019 05:12) (65 - 85)    BP: 142/65 (31 Jul 2019 05:12) (142/65 - 179/74)    BP(mean): --    RR: 17 (31 Jul 2019 05:12) (17 - 18)    SpO2: 96% (31 Jul 2019 05:12) (91% - 98%)        PHYSICAL EXAM:     GENERAL: NAD    HEAD:  Atraumatic, Normocephalic    EYES: EOMI, PERRLA, conjunctiva and sclera clear    ENMT: Moist mucous membranes    NECK: Supple, No JVD    NERVOUS SYSTEM:  Alert & Oriented X2, slightly confused, non focal    CHEST/LUNG: Clear to auscultation bilaterally; No rales, rhonchi, wheezing, or rubs    HEART: Regular rate and rhythm; No murmurs, rubs, or gallops    ABDOMEN: Soft, Nontender, Nondistended; Bowel sounds present, Yu draining blood tinged urine w small clots.     EXTREMITIES:  2+ Peripheral Pulses, No clubbing, cyanosis, or edema        A/P: DC to [facility] 89y Male PMH Macular Degenerative Disease, AS s/p TAVR, GI bleed, HLD, Depression, BPH, EF 65-70% & Quaker, s/p L Femoral Shaft Fracture s/p IMN 5/2019, sent from [facility] for fever and persistent cough.  Per MD at [facility], pt had been treated for cough, started on Zosyn x 4 days with no improvement.  Noted fever today and transferred to ED for further evaluation.  He admits to generalized fatigue, denies SOB.  In ED, patient noted with new onsent Afib / Aflutter with RVR, controlled with Cardizem and BBlocker. I RVP + for parainfluenza.  CXR showed worsening infiltrate.  SaO2 dropped to 88% on 50% VM and pt placed on high flow O2.  ICU consulted and pt transferred to Stepdown.  He responded to IV Abx, clinically improved and downgraded to medical floor.  Noted with hematuria and urinary retention, evaluated by urology and underwent cystoscopy.  Yu placed over guidewire. Pt was initally started on eliquis after hematuria resolved however patient later developed recurrent hematuria and decision was made to continue low dose aspirin instead of Eliquis.  H/H now stable. Hematuria now cleared. Pt to be discharged to [facility]. Pt requiring 2L NC due to PNA. Taper off O2 as tolerated        Vital Signs Last 24 Hrs    T(C): 36.8 (30 Jul 2019 23:17), Max: 36.8 (30 Jul 2019 23:17)    T(F): 98.3 (30 Jul 2019 23:17), Max: 98.3 (30 Jul 2019 23:17)    HR: 82 (31 Jul 2019 05:12) (65 - 85)    BP: 142/65 (31 Jul 2019 05:12) (142/65 - 179/74)    BP(mean): --    RR: 17 (31 Jul 2019 05:12) (17 - 18)    SpO2: 96% (31 Jul 2019 05:12) (91% - 98%)        PHYSICAL EXAM:     GENERAL: NAD    HEAD:  Atraumatic, Normocephalic    EYES: EOMI, PERRLA, conjunctiva and sclera clear    ENMT: Moist mucous membranes    NECK: Supple, No JVD    NERVOUS SYSTEM:  Alert & Oriented X2, slightly confused, non focal    CHEST/LUNG: Clear to auscultation bilaterally; No rales, rhonchi, wheezing, or rubs    HEART: Regular rate and rhythm; No murmurs, rubs, or gallops    ABDOMEN: Soft, Nontender, Nondistended; Bowel sounds present, Yu draining clear urine    EXTREMITIES:  2+ Peripheral Pulses, No clubbing, cyanosis, or edema        A/P: DC to [facility]. Pt's son notified who agrees with plan

## 2019-07-31 NOTE — PROGRESS NOTE ADULT - PROVIDER SPECIALTY LIST ADULT
Cardiology
Hospitalist
Infectious Disease
Infectious Disease
Urology
Urology
Infectious Disease
Cardiology
Hospitalist

## 2019-07-31 NOTE — PROGRESS NOTE ADULT - SUBJECTIVE AND OBJECTIVE BOX
Chief Complaint: chart and recent course reviewed.    HPI: 89 yo male with rapid atrial flutter and PNA on admission. Reverted to SR. Hx of TAVR.    PAST MEDICAL & SURGICAL HISTORY:  Macular degeneration  UTI (urinary tract infection): 2013  Narcolepsy  Aortic stenosis  GI bleed  HLD (hyperlipidemia)  Depression  Anemia  Endocarditis: 2013  BPH (benign prostatic hyperplasia)  Fatigue  S/P TAVR (transcatheter aortic valve replacement)  S/P cataract extraction and insertion of intraocular lens: bilat      PREVIOUS DIAGNOSTIC TESTING:      ECHO  FINDINGS: preserved EF/well functioning TAVR.    STRESS  FINDINGS:    CATHETERIZATION  FINDINGS:    MEDICATIONS  (STANDING):  aspirin  chewable 81 milliGRAM(s) Oral daily  chlorhexidine 2% Cloths 1 Application(s) Topical daily  docusate sodium 100 milliGRAM(s) Oral three times a day  ertapenem  IVPB 1000 milliGRAM(s) IV Intermittent every 24 hours  escitalopram 20 milliGRAM(s) Oral daily  hydrALAZINE 50 milliGRAM(s) Oral every 8 hours  memantine 10 milliGRAM(s) Oral daily  metoprolol tartrate 25 milliGRAM(s) Oral four times a day  saccharomyces boulardii 250 milliGRAM(s) Oral two times a day  tamsulosin 0.8 milliGRAM(s) Oral at bedtime    MEDICATIONS  (PRN):  acetaminophen   Tablet .. 650 milliGRAM(s) Oral every 6 hours PRN Temp greater or equal to 38C (100.4F)  diltiazem Injectable 10 milliGRAM(s) IV Push every 6 hours PRN SBP > 160  hydrALAZINE Injectable 10 milliGRAM(s) IV Push every 6 hours PRN for SBP > 150      FAMILY HISTORY:  No pertinent family history in first degree relatives      ROS: Negative other than as mentioned in HPI.    Vital Signs Last 24 Hrs  T(C): 36.7 (30 Jul 2019 08:30), Max: 36.7 (30 Jul 2019 08:30)  T(F): 98 (30 Jul 2019 08:30), Max: 98 (30 Jul 2019 08:30)  HR: 70 reg (30 Jul 2019 11:03) (68 - 73)  BP: 165/72 (30 Jul 2019 11:03) (158/70 - 185/72)  BP(mean): --  RR: 14 on nasal 02 (30 Jul 2019 11:03) (18 - 18)  SpO2: 92% (30 Jul 2019 11:03) (92% - 95%)    PHYSICAL EXAM:  General: Appears well developed, well nourished alert and cooperative. elderly afebrile [place] male nad.  HEENT: Head; normocephalic, atraumatic.  Eyes;   Pupils reactive, cornea wnl.  Neck; Supple, no nodes adenopathy, no NVD or carotid bruit or thyromegaly.  CARDIOVASCULAR; No murmur, rub, gallop or lift. Normal S1 and S2.  LUNGS; good BS bilat-no wheeze/rales  ABDOMEN ; Soft, nontender without mass or organomegaly. bowel sounds normoactive.  EXTREMITIES; No clubbing, cyanosis or edema.    SKIN; warm and dry with normal turgor.  NEURO; Alert/oriented x 3/normal motor exam.    PSYCH; normal affect.            INTERPRETATION OF TELEMETRY:    ECG: monitor shows SR    I&O's Detail    29 Jul 2019 07:01  -  30 Jul 2019 07:00  --------------------------------------------------------  IN:  Total IN: 0 mL    OUT:    Indwelling Catheter - Urethral: 1260 mL    Voided: 350 mL  Total OUT: 1610 mL    Total NET: -1610 mL          LABS:                        10.4   7.20  )-----------( 291      ( 30 Jul 2019 07:59 )             32.2     07-30    136  |  98  |  12.0  ----------------------------<  111  3.9   |  27.0  |  0.49<L>    Ca    8.0<L>      30 Jul 2019 07:59  Phos  2.9     07-30  Mg     1.7     07-30              I&O's Summary    29 Jul 2019 07:01  -  30 Jul 2019 07:00  --------------------------------------------------------  IN: 0 mL / OUT: 1610 mL / NET: -1610 mL        RADIOLOGY & ADDITIONAL STUDIES:
Patient: PRASHANT MORALES [id number] 89y Male                           Internal Medicine Hospitalist Progress Note    Initial HPI:  89y Male PMH Macular Degenerative Disease, AS s/p TAVR, GI bleed, HLD, Depression, BPH, EF 65-70% & [place], s/p L Femoral Shaft Fracture s/p IMN 2019, sent from [facility] for fever and persistent cough.  Per MD at [facility], pt had been treated for cough, started on Zosyn x 4 days with no improvement.  Noted fever today and transferred to ED for further evaluation.  He admits to generalized fatigue, denies SOB.  Noted in Afib / Aflutter with RVR, controlled with Cardizem and BBlocker.  RVP + for parainfluenza.  CXR showed worsening infiltrate.  SaO2 dropped to 88% on 50% VM and pt placed on high flow O2.  ICU consulted and pt transferred to Stepdown.     Interval History:  Overnight, SaO2 improved.  On high flow O2 resting on chin, SaO2 96%.      ____________________PHYSICAL EXAM:  Vitals reviewed as indicated below  GENERAL:  NAD Alert and Oriented x 2 to person, place. Slightly confused, unchanged.   HEENT: NCAT  CARDIOVASCULAR:  S1, S2  LUNGS: b/l rhonchi.   ABDOMEN:  soft, (-) tenderness, (-) distension, (+) bowel sounds, (-) guarding, (-) rebound (-) rigidity  EXTREMITIES:  no cyanosis / clubbing / edema.   ____________________      BACKGROUND:  HEALTH ISSUES - PROBLEM Dx:  Sepsis, due to unspecified organism: Sepsis, due to unspecified organism  Aortic stenosis: Aortic stenosis  Atrial flutter with rapid ventricular response: Atrial flutter with rapid ventricular response  HCAP (healthcare-associated pneumonia): HCAP (healthcare-associated pneumonia)        Allergies    No Known Allergies    Intolerances      PAST MEDICAL & SURGICAL HISTORY:  Macular degeneration  UTI (urinary tract infection):   Narcolepsy  Aortic stenosis  GI bleed  HLD (hyperlipidemia)  Depression  Anemia  Endocarditis:   BPH (benign prostatic hyperplasia)  Fatigue  S/P TAVR (transcatheter aortic valve replacement)  S/P cataract extraction and insertion of intraocular lens: bilat        VITALS:  Vital Signs Last 24 Hrs  T(C): 36.8 (2019 05:21), Max: 38.2 (2019 18:11)  T(F): 98.2 (2019 05:21), Max: 100.7 (2019 18:11)  HR: 78 (2019 10:00) (78 - 119)  BP: 151/64 (2019 10:00) (130/58 - 166/71)  BP(mean): 97 (2019 04:00) (87 - 97)  RR: 22 (2019 10:00) (19 - 35)  SpO2: 98% (2019 10:00) (90% - 99%) Daily Height in cm: 167.64 (2019 20:00)    Daily Weight in k.6 (2019 05:21)  CAPILLARY BLOOD GLUCOSE      POCT Blood Glucose.: 113 mg/dL (2019 03:14)    I&O's Summary    2019 07:  -  2019 07:00  --------------------------------------------------------  IN: 875 mL / OUT: 0 mL / NET: 875 mL    2019 07:01  -  2019 10:24  --------------------------------------------------------  IN: 300 mL / OUT: 0 mL / NET: 300 mL          LABS:                        10.4   5.25  )-----------( 176      ( 2019 04:55 )             31.6     07-25    137  |  103  |  38.0<H>  ----------------------------<  111  3.0<L>   |  23.0  |  1.03    Ca    8.3<L>      2019 04:55  Mg     2.1         TPro  7.1  /  Alb  3.5  /  TBili  0.3<L>  /  DBili  x   /  AST  31  /  ALT  15  /  AlkPhos  77  07-24    PT/INR - ( 2019 08:50 )   PT: 12.7 sec;   INR: 1.10 ratio         PTT - ( 2019 08:50 )  PTT:33.8 sec  LIVER FUNCTIONS - ( 2019 08:50 )  Alb: 3.5 g/dL / Pro: 7.1 g/dL / ALK PHOS: 77 U/L / ALT: 15 U/L / AST: 31 U/L / GGT: x                     MEDICATIONS:  MEDICATIONS  (STANDING):  apixaban 2.5 milliGRAM(s) Oral every 12 hours  aspirin  chewable 81 milliGRAM(s) Oral daily  docusate sodium 100 milliGRAM(s) Oral three times a day  ertapenem  IVPB 1000 milliGRAM(s) IV Intermittent every 24 hours  escitalopram 20 milliGRAM(s) Oral daily  linezolid  IVPB 600 milliGRAM(s) IV Intermittent every 12 hours  memantine 10 milliGRAM(s) Oral daily  metoprolol tartrate 25 milliGRAM(s) Oral four times a day  potassium chloride  10 mEq/100 mL IVPB 10 milliEquivalent(s) IV Intermittent every 1 hour  saccharomyces boulardii 250 milliGRAM(s) Oral two times a day  sodium chloride 0.9% with potassium chloride 20 mEq/L 1000 milliLiter(s) (75 mL/Hr) IV Continuous <Continuous>  tamsulosin Oral Tab/Cap - Peds 0.4 milliGRAM(s) Oral at bedtime    MEDICATIONS  (PRN):  acetaminophen   Tablet .. 650 milliGRAM(s) Oral every 6 hours PRN Temp greater or equal to 38C (100.4F)
[facility]                                   [address] [place] [address]                                                      PHONE: [phone]                                                         FAX: [phone]  [email or web address]  ---------------------------------------------------------------------------------------------------------------------------------    FU for  Pt seen and examined. Patient denies any complaints    Overnight events/patient complaints:    No Known Allergies    MEDICATIONS  (STANDING):  apixaban 2.5 milliGRAM(s) Oral every 12 hours  aspirin  chewable 81 milliGRAM(s) Oral daily  docusate sodium 100 milliGRAM(s) Oral three times a day  ertapenem  IVPB 1000 milliGRAM(s) IV Intermittent every 24 hours  escitalopram 20 milliGRAM(s) Oral daily  memantine 10 milliGRAM(s) Oral daily  metoprolol tartrate 25 milliGRAM(s) Oral four times a day  morphine  - Injectable 1 milliGRAM(s) IV Push once  saccharomyces boulardii 250 milliGRAM(s) Oral two times a day  tamsulosin 0.8 milliGRAM(s) Oral at bedtime    MEDICATIONS  (PRN):  acetaminophen   Tablet .. 650 milliGRAM(s) Oral every 6 hours PRN Temp greater or equal to 38C (100.4F)  diltiazem Injectable 10 milliGRAM(s) IV Push every 6 hours PRN SBP > 160      Vital Signs Last 24 Hrs  T(C): 36.4 (26 Jul 2019 08:14), Max: 37.1 (26 Jul 2019 00:26)  T(F): 97.5 (26 Jul 2019 08:14), Max: 98.8 (26 Jul 2019 00:26)  HR: 70 (26 Jul 2019 04:32) (68 - 84)  BP: 131/53 (26 Jul 2019 04:32) (131/53 - 186/84)  BP(mean): 76 (26 Jul 2019 04:32) (76 - 93)  RR: 23 (26 Jul 2019 04:32) (16 - 29)  SpO2: 92% (26 Jul 2019 04:32) (92% - 98%)  ICU Vital Signs Last 24 Hrs  I&O's Summary    25 Jul 2019 07:01  -  26 Jul 2019 07:00  --------------------------------------------------------  IN: 1615 mL / OUT: 1110 mL / NET: 505 mL        PHYSICAL EXAM:  General: well built, resting comfortably  HEENT: no JVD  CARDIOVASCULAR: Normal S1 and S2, No murmur, rub, gallop or lift.   LUNGS: No rales, rhonchi or wheeze. Normal breath sounds bilaterally.  ABDOMEN: Soft, nontender without mass or organomegaly. bowel sounds normoactive.  EXTREMITIES: no edema  Neuro: awake alert          LABS:                        10.5   5.22  )-----------( 160      ( 26 Jul 2019 05:19 )             32.6     07-26    135  |  103  |  30.0<H>  ----------------------------<  104  3.4<L>   |  20.0<L>  |  0.75    Ca    8.3<L>      26 Jul 2019 05:19  Mg     2.1     07-25    TPro  7.1  /  Alb  3.5  /  TBili  0.3<L>  /  DBili  x   /  AST  31  /  ALT  15  /  AlkPhos  77  07-24    PRASHANT MORALES      PT/INR - ( 25 Jul 2019 19:17 )   PT: 13.5 sec;   INR: 1.17 ratio         PTT - ( 25 Jul 2019 19:17 )  PTT:28.6 sec      RADIOLOGY & ADDITIONAL STUDIES:    LABS:                        10.5   5.22  )-----------( 160      ( 26 Jul 2019 05:19 )             32.6     07-26    135  |  103  |  30.0<H>  ----------------------------<  104  3.4<L>   |  20.0<L>  |  0.75    Ca    8.3<L>      26 Jul 2019 05:19  Mg     2.1     07-25    TPro  7.1  /  Alb  3.5  /  TBili  0.3<L>  /  DBili  x   /  AST  31  /  ALT  15  /  AlkPhos  77  07-24    89y      PT/INR - ( 25 Jul 2019 19:17 )   PT: 13.5 sec;   INR: 1.17 ratio         PTT - ( 25 Jul 2019 19:17 )  PTT:28.6 sec      Assessment and Plan:  Elderly male in [facility] facility following surgery for fx'd left hip in April. This AM noted to be increasingly lethargic/weak with rapid pulse and decreased pulse Ox. In ER he had a low grade fever/rapid atrial flutter on ekg and abnl cxr compatible with PNA. PMH+ for endocarditis in 2013. Underwent a TAVR in 2016 for severe aortic stenosis. (MALISSA showed LVH with nl EF and severe AS). Was being treated for "bronchitis" at [facility]. Pt denies cp or sob but notes thick sputum production. He denies fever or chills. PMH neg for MI/cp/cva/syncope/seizure/pulmonary/renal/hepatic/ or endocrine hx. No other surgery. Denies allergy. Nonsmoker/etoh. ROS neg for edema/orthopnea/pnd. OP meds: 81 asa/escitalopram/pepcoid/FE and vit C/namenda/lopressor 25 bid/modafinil 20 qd/flomax/ubiquinone 100.  A flutter: now in sinus rhythm. Vitals stable  Parainflenza  Please reconsult if need arises
CHIEF COMPLAINT/INTERVAL HISTORY:    Patient is a 89y old  Male who presents with a chief complaint of Fever (26 Jul 2019 11:11)      HPI:  89y Male PMH Macular Degenerative Disease, AS s/p TAVR, GI bleed, HLD, Depression, BPH, EF 65-70% & [place], s/p L Femoral Shaft Fracture s/p IMN 5/2019, sent from [facility] for fever and persistent cough.  Per MD at [facility], pt had been treated for cough, started on Zosyn x 4 days with no improvement.  Noted fever today and transferred to ED for further evaluation.  He admits to generalized fatigue, denies SOB.  Noted in Afib / Aflutter with RVR, controlled with Cardizem and BBlocker.  RVP + for parainfluenza.  CXR showed worsening infiltrate.  Feeling better in ED.  No additional complaints.    FAMILY HISTORY: reviewed and negative.  SOCIAL HISTORY: - alcohol, - IVDA, - smoking.  REVIEW OF SYSTEMS: General: + fatigue, - weight loss, + fevers, - chills.  HEENT: - headache, - hearing disturbances.  EYES: - visual disturbances, - diplopia.  CARDIOVASCULAR: - chest pain, - palpitations.  PULMONARY: - SOB, +cough, - hemoptysis.  GI: - abdominal pain, - nausea, - vomiting, - diarrhea, - constipation.  : - urinary urgency, - frequency, - dysuria.  MUSCULOSKELETAL: - arthralgias, - myalgias.  NEURO:  - weakness, - numbness.  PSYCH: - depression, - anxiety, - suicidal thoughts. SKIN: - rashes, - lesions.  All remaining ROS are negative.Allergies    No Known Allergies    Intolerances (24 Jul 2019 13:30)      SUBJECTIVE & OBJECTIVE/ ROS: Pt seen and examined at bedside. He denies pain.  no fever / chills.  No SOB.  On O2 via nc.       ICU Vital Signs Last 24 Hrs  T(C): 36.6 (27 Jul 2019 08:49), Max: 37.1 (26 Jul 2019 19:28)  T(F): 97.9 (27 Jul 2019 08:49), Max: 98.7 (26 Jul 2019 19:28)  HR: 79 (27 Jul 2019 14:02) (72 - 79)  BP: 170/74 (27 Jul 2019 14:02) (140/62 - 170/74)  BP(mean): --  ABP: --  ABP(mean): --  RR: 18 (27 Jul 2019 08:49) (18 - 20)  SpO2: 95% (27 Jul 2019 14:02) (93% - 95%)        MEDICATIONS  (STANDING):  apixaban 2.5 milliGRAM(s) Oral every 12 hours  aspirin  chewable 81 milliGRAM(s) Oral daily  docusate sodium 100 milliGRAM(s) Oral three times a day  ertapenem  IVPB 1000 milliGRAM(s) IV Intermittent every 24 hours  escitalopram 20 milliGRAM(s) Oral daily  memantine 10 milliGRAM(s) Oral daily  metoprolol tartrate 25 milliGRAM(s) Oral four times a day  saccharomyces boulardii 250 milliGRAM(s) Oral two times a day  tamsulosin 0.8 milliGRAM(s) Oral at bedtime    MEDICATIONS  (PRN):  acetaminophen   Tablet .. 650 milliGRAM(s) Oral every 6 hours PRN Temp greater or equal to 38C (100.4F)  diltiazem Injectable 10 milliGRAM(s) IV Push every 6 hours PRN SBP > 160      LABS:                        10.5   5.22  )-----------( 160      ( 26 Jul 2019 05:19 )             32.6     07-26    135  |  103  |  30.0<H>  ----------------------------<  104  3.4<L>   |  20.0<L>  |  0.75    Ca    8.3<L>      26 Jul 2019 05:19      PT/INR - ( 25 Jul 2019 19:17 )   PT: 13.5 sec;   INR: 1.17 ratio         PTT - ( 25 Jul 2019 19:17 )  PTT:28.6 sec      CAPILLARY BLOOD GLUCOSE          RECENT CULTURES:      RADIOLOGY & ADDITIONAL TESTS:      PHYSICAL EXAM:    GENERAL: NAD  HEAD:  Atraumatic, Normocephalic  EYES: EOMI, PERRLA, conjunctiva and sclera clear  ENMT: Moist mucous membranes  NECK: Supple, No JVD  NERVOUS SYSTEM:  Alert & Oriented X2, slightly confused, non focal  CHEST/LUNG: Clear to auscultation bilaterally; No rales, rhonchi, wheezing, or rubs  HEART: Regular rate and rhythm; No murmurs, rubs, or gallops  ABDOMEN: Soft, Nontender, Nondistended; Bowel sounds present  EXTREMITIES:  2+ Peripheral Pulses, No clubbing, cyanosis, or edema
CHIEF COMPLAINT/INTERVAL HISTORY:    Patient is a 89y old  Male who presents with a chief complaint of Fever (26 Jul 2019 11:11)      HPI:  89y Male PMH Macular Degenerative Disease, AS s/p TAVR, GI bleed, HLD, Depression, BPH, EF 65-70% & [place], s/p L Femoral Shaft Fracture s/p IMN 5/2019, sent from [facility] for fever and persistent cough.  Per MD at [facility], pt had been treated for cough, started on Zosyn x 4 days with no improvement.  Noted fever today and transferred to ED for further evaluation.  He admits to generalized fatigue, denies SOB.  Noted in Afib / Aflutter with RVR, controlled with Cardizem and BBlocker.  RVP + for parainfluenza.  CXR showed worsening infiltrate.  Feeling better in ED.  No additional complaints.    FAMILY HISTORY: reviewed and negative.  SOCIAL HISTORY: - alcohol, - IVDA, - smoking.  REVIEW OF SYSTEMS: General: + fatigue, - weight loss, + fevers, - chills.  HEENT: - headache, - hearing disturbances.  EYES: - visual disturbances, - diplopia.  CARDIOVASCULAR: - chest pain, - palpitations.  PULMONARY: - SOB, +cough, - hemoptysis.  GI: - abdominal pain, - nausea, - vomiting, - diarrhea, - constipation.  : - urinary urgency, - frequency, - dysuria.  MUSCULOSKELETAL: - arthralgias, - myalgias.  NEURO:  - weakness, - numbness.  PSYCH: - depression, - anxiety, - suicidal thoughts. SKIN: - rashes, - lesions.  All remaining ROS are negative.Allergies    No Known Allergies    Intolerances (24 Jul 2019 13:30)      SUBJECTIVE & OBJECTIVE/ ROS: Pt seen and examined at bedside. He denies pain.  no fever / chills.  No SOB.  On O2 via nc.  No overnight issues reported       ICU Vital Signs Last 24 Hrs  T(C): 36.6 (27 Jul 2019 08:49), Max: 37.1 (26 Jul 2019 19:28)  T(F): 97.9 (27 Jul 2019 08:49), Max: 98.7 (26 Jul 2019 19:28)  HR: 79 (27 Jul 2019 14:02) (72 - 79)  BP: 170/74 (27 Jul 2019 14:02) (140/62 - 170/74)  BP(mean): --  ABP: --  ABP(mean): --  RR: 18 (27 Jul 2019 08:49) (18 - 20)  SpO2: 95% (27 Jul 2019 14:02) (93% - 95%)        MEDICATIONS  (STANDING):  apixaban 2.5 milliGRAM(s) Oral every 12 hours  aspirin  chewable 81 milliGRAM(s) Oral daily  docusate sodium 100 milliGRAM(s) Oral three times a day  ertapenem  IVPB 1000 milliGRAM(s) IV Intermittent every 24 hours  escitalopram 20 milliGRAM(s) Oral daily  memantine 10 milliGRAM(s) Oral daily  metoprolol tartrate 25 milliGRAM(s) Oral four times a day  saccharomyces boulardii 250 milliGRAM(s) Oral two times a day  tamsulosin 0.8 milliGRAM(s) Oral at bedtime    MEDICATIONS  (PRN):  acetaminophen   Tablet .. 650 milliGRAM(s) Oral every 6 hours PRN Temp greater or equal to 38C (100.4F)  diltiazem Injectable 10 milliGRAM(s) IV Push every 6 hours PRN SBP > 160      LABS:                        10.5   5.22  )-----------( 160      ( 26 Jul 2019 05:19 )             32.6     07-26    135  |  103  |  30.0<H>  ----------------------------<  104  3.4<L>   |  20.0<L>  |  0.75    Ca    8.3<L>      26 Jul 2019 05:19      PT/INR - ( 25 Jul 2019 19:17 )   PT: 13.5 sec;   INR: 1.17 ratio         PTT - ( 25 Jul 2019 19:17 )  PTT:28.6 sec      CAPILLARY BLOOD GLUCOSE          RECENT CULTURES:      RADIOLOGY & ADDITIONAL TESTS:      PHYSICAL EXAM:    GENERAL: NAD  HEAD:  Atraumatic, Normocephalic  EYES: EOMI, PERRLA, conjunctiva and sclera clear  ENMT: Moist mucous membranes  NECK: Supple, No JVD  NERVOUS SYSTEM:  Alert & Oriented X2, slightly confused, non focal  CHEST/LUNG: Clear to auscultation bilaterally; No rales, rhonchi, wheezing, or rubs  HEART: Regular rate and rhythm; No murmurs, rubs, or gallops  ABDOMEN: Soft, Nontender, Nondistended; Bowel sounds present  EXTREMITIES:  2+ Peripheral Pulses, No clubbing, cyanosis, or edema
Chief Complaint: course and chart reviewed.    HPI: ID consult reviewed. Pt looks much improved. Sitting in chair w/o distress. Presented with hypoxia and fever with rapid atrial flutter. Hx of TAVR. Tested + for parainfluenza. On ertrapenem and linozelid    PAST MEDICAL & SURGICAL HISTORY:  Macular degeneration  UTI (urinary tract infection): 2013  Narcolepsy  Aortic stenosis  GI bleed  HLD (hyperlipidemia)  Depression  Anemia  Endocarditis: 2013  BPH (benign prostatic hyperplasia)  Fatigue  S/P TAVR (transcatheter aortic valve replacement)  S/P cataract extraction and insertion of intraocular lens: bilat      PREVIOUS DIAGNOSTIC TESTING:      ECHO  FINDINGS:    STRESS  FINDINGS:    CATHETERIZATION  FINDINGS:    MEDICATIONS  (STANDING):  apixaban 2.5 milliGRAM(s) Oral every 12 hours  aspirin  chewable 81 milliGRAM(s) Oral daily  docusate sodium 100 milliGRAM(s) Oral three times a day  ertapenem  IVPB 1000 milliGRAM(s) IV Intermittent every 24 hours  escitalopram 20 milliGRAM(s) Oral daily  linezolid  IVPB 600 milliGRAM(s) IV Intermittent every 12 hours  memantine 10 milliGRAM(s) Oral daily  metoprolol tartrate 25 milliGRAM(s) Oral four times a day  potassium chloride  10 mEq/100 mL IVPB 10 milliEquivalent(s) IV Intermittent every 1 hour  saccharomyces boulardii 250 milliGRAM(s) Oral two times a day  sodium chloride 0.9% with potassium chloride 20 mEq/L 1000 milliLiter(s) (75 mL/Hr) IV Continuous <Continuous>  tamsulosin Oral Tab/Cap - Peds 0.4 milliGRAM(s) Oral at bedtime    MEDICATIONS  (PRN):  acetaminophen   Tablet .. 650 milliGRAM(s) Oral every 6 hours PRN Temp greater or equal to 38C (100.4F)      FAMILY HISTORY:  No pertinent family history in first degree relatives      ROS: Negative other than as mentioned in HPI.    Vital Signs Last 24 Hrs  T(C): 36.8 (25 Jul 2019 05:21), Max: 38.2 (24 Jul 2019 18:11)  T(F): 98.2 (25 Jul 2019 05:21), Max: 100.7 (24 Jul 2019 18:11)  HR: 70 reg (25 Jul 2019 10:00) (78 - 119)  BP: 151/64 (25 Jul 2019 10:00) (130/58 - 166/71)  BP(mean): 97 (25 Jul 2019 04:00) (87 - 97)  RR: 16 on nasal 02. (25 Jul 2019 10:00) (19 - 35)  SpO2: 98% (25 Jul 2019 10:00) (90% - 99%)    PHYSICAL EXAM:  General: Appears well developed, well nourished alert and cooperative. Elderly afebrile male nad.  Eyes;   Pupils reactive, cornea wnl.  Neck; Supple, no nodes adenopathy, no NVD or carotid bruit or thyromegaly.  CARDIOVASCULAR; soft basal systolic murmur, No rub, gallop or lift. Normal S1 and S2.  LUNGS; very coarse BS bilat. coughing up brownish phlegm  ABDOMEN ; Soft, nontender without mass or organomegaly. bowel sounds normoactive.  EXTREMITIES; No clubbing, cyanosis or edema.   SKIN; warm and dry with normal turgor.  NEURO; Alert/oriented x 3/normal motor exam.     PSYCH; normal affect.            INTERPRETATION OF TELEMETRY:    ECG: monitor reviewed-SR    I&O's Detail    24 Jul 2019 07:01  -  25 Jul 2019 07:00  --------------------------------------------------------  IN:    sodium chloride 0.9% with potassium chloride 20 mEq/L: 675 mL    Solution: 200 mL  Total IN: 875 mL    OUT:  Total OUT: 0 mL    Total NET: 875 mL      25 Jul 2019 07:01  -  25 Jul 2019 10:27  --------------------------------------------------------  IN:    sodium chloride 0.9% with potassium chloride 20 mEq/L: 300 mL  Total IN: 300 mL    OUT:  Total OUT: 0 mL    Total NET: 300 mL          LABS:                        10.4   5.25  )-----------( 176      ( 25 Jul 2019 04:55 )             31.6     07-25    137  |  103  |  38.0<H>  ----------------------------<  111  3.0<L>   |  23.0  |  1.03    Ca    8.3<L>      25 Jul 2019 04:55  Mg     2.1     07-25    TPro  7.1  /  Alb  3.5  /  TBili  0.3<L>  /  DBili  x   /  AST  31  /  ALT  15  /  AlkPhos  77  07-24        PT/INR - ( 24 Jul 2019 08:50 )   PT: 12.7 sec;   INR: 1.10 ratio         PTT - ( 24 Jul 2019 08:50 )  PTT:33.8 sec    I&O's Summary    24 Jul 2019 07:01  -  25 Jul 2019 07:00  --------------------------------------------------------  IN: 875 mL / OUT: 0 mL / NET: 875 mL    25 Jul 2019 07:01  -  25 Jul 2019 10:27  --------------------------------------------------------  IN: 300 mL / OUT: 0 mL / NET: 300 mL        RADIOLOGY & ADDITIONAL STUDIES:
[facility]  INFECTIOUS DISEASES AND INTERNAL MEDICINE at [place]  =======================================================  Jossue Up MD  Diplomates American Board of Internal Medicine and Infectious Diseases  Telephone [phone]  Fax            [phone]  =======================================================    [id number]  PRASHANT MORALES   follow up for:  parainfluenza virus infection, Pneumonia  patient seen and examined.     appears better  high flow oxygen overnight,   now nasal cannnula    ===================================================  REVIEW OF SYSTEMS:  Limited due to medical condition  =======================================================  Allergies  No Known Allergies     Antibiotics:  ertapenem  IVPB 1000 milliGRAM(s) IV Intermittent every 24 hours  linezolid  IVPB 600 milliGRAM(s) IV Intermittent every 12 hours    Other medications:  apixaban 2.5 milliGRAM(s) Oral every 12 hours  aspirin  chewable 81 milliGRAM(s) Oral daily  docusate sodium 100 milliGRAM(s) Oral three times a day  escitalopram 20 milliGRAM(s) Oral daily  memantine 10 milliGRAM(s) Oral daily  metoprolol tartrate 25 milliGRAM(s) Oral four times a day  potassium chloride  10 mEq/100 mL IVPB 10 milliEquivalent(s) IV Intermittent every 1 hour  saccharomyces boulardii 250 milliGRAM(s) Oral two times a day  sodium chloride 0.9% with potassium chloride 20 mEq/L 1000 milliLiter(s) IV Continuous <Continuous>  tamsulosin Oral Tab/Cap - Peds 0.4 milliGRAM(s) Oral at bedtime    ======================================================  Physical Exam:  ============  T(F): 98.2 (25 Jul 2019 05:21), Max: 100.7 (24 Jul 2019 18:11)  HR: 84 (25 Jul 2019 08:22)  BP: 134/85 (25 Jul 2019 08:22)  RR: 20 (25 Jul 2019 08:22)  SpO2: 97% (25 Jul 2019 08:22) (90% - 99%)  temp max in last 48H T(F): , Max: 100.7 (07-24-19 @ 18:11)    General:  No acute distress. THIN FRAIL  Eye: Pupils are equal, round and reactive to light, Extraocular movements are intact, Normal conjunctiva.  HENT: Normocephalic, Oral mucosa is DRY  Neck: Supple, No lymphadenopathy.  Respiratory: Lungs WITH COARSE BREATH SOUNDS BILATERALLY.   Cardiovascular: Normal rate, Regular rhythm,   Gastrointestinal: Soft, Non-tender, Non-distended, Normal bowel sounds.  Genitourinary: No costovertebral angle tenderness.  Lymphatics: No lymphadenopathy neck,   Musculoskeletal: Normal range of motion, Normal strength.  Integumentary: MOTTLED SKIN ON LOWER EXTREMITIES - resolved  Neurologic: Alert, Oriented, No focal deficits, Cranial Nerves II-XII are grossly intact.  Psychiatric: PLEASANT      =======================================================  Labs:                        10.4   5.25  )-----------( 176      ( 25 Jul 2019 04:55 )             31.6       WBC Count: 5.25 K/uL (07-25-19 @ 04:55)  WBC Count: 6.44 K/uL (07-24-19 @ 08:50)      07-25    137  |  103  |  38.0<H>  ----------------------------<  111  3.0<L>   |  23.0  |  1.03    Ca    8.3<L>      25 Jul 2019 04:55  Mg     2.1     07-25    TPro  7.1  /  Alb  3.5  /  TBili  0.3<L>  /  DBili  x   /  AST  31  /  ALT  15  /  AlkPhos  77  07-24      Creatinine, Serum: 1.03 mg/dL (07-25-19 @ 04:55)  Creatinine, Serum: 1.27 mg/dL (07-24-19 @ 08:50)
[place] [facility] - [facility]                                   [address] [place] [address]                                                      PHONE: [phone]                                                         FAX: [phone]  [email or web address]  ---------------------------------------------------------------------------------------------------------------------------------    FU for  Pt seen and examined. Patient resting comfortably    Overnight events/patient complaints:    No Known Allergies    MEDICATIONS  (STANDING):  aspirin  chewable 81 milliGRAM(s) Oral daily  chlorhexidine 2% Cloths 1 Application(s) Topical daily  docusate sodium 100 milliGRAM(s) Oral three times a day  escitalopram 20 milliGRAM(s) Oral daily  hydrALAZINE 100 milliGRAM(s) Oral every 8 hours  memantine 10 milliGRAM(s) Oral daily  metoprolol tartrate 25 milliGRAM(s) Oral four times a day  saccharomyces boulardii 250 milliGRAM(s) Oral two times a day  tamsulosin 0.8 milliGRAM(s) Oral at bedtime    MEDICATIONS  (PRN):  acetaminophen   Tablet .. 650 milliGRAM(s) Oral every 6 hours PRN Temp greater or equal to 38C (100.4F)  hydrALAZINE Injectable 10 milliGRAM(s) IV Push every 6 hours PRN for SBP > 150      Vital Signs Last 24 Hrs  T(C): 36.8 (30 Jul 2019 23:17), Max: 36.8 (30 Jul 2019 23:17)  T(F): 98.3 (30 Jul 2019 23:17), Max: 98.3 (30 Jul 2019 23:17)  HR: 82 (31 Jul 2019 05:12) (65 - 85)  BP: 142/65 (31 Jul 2019 05:12) (142/65 - 179/74)  BP(mean): --  RR: 17 (31 Jul 2019 05:12) (17 - 18)  SpO2: 96% (31 Jul 2019 05:12) (91% - 98%)  ICU Vital Signs Last 24 Hrs  I&O's Summary    30 Jul 2019 07:01  -  31 Jul 2019 07:00  --------------------------------------------------------  IN: 0 mL / OUT: 600 mL / NET: -600 mL        PHYSICAL EXAM:  General: well built, resting comfortably  HEENT: no JVD  CARDIOVASCULAR: Normal S1 and S2, No murmur, rub, gallop or lift.   LUNGS: No rales, rhonchi or wheeze. Normal breath sounds bilaterally.  ABDOMEN: Soft, nontender without mass or organomegaly. bowel sounds normoactive.  EXTREMITIES: no edema  Neuro: awake alert          LABS:                        10.8   6.08  )-----------( 327      ( 31 Jul 2019 07:03 )             32.9     07-31    135  |  97<L>  |  10.0  ----------------------------<  130<H>  3.8   |  27.0  |  0.56    Ca    8.1<L>      31 Jul 2019 07:03  Phos  3.1     07-31  Mg     1.7     07-31      PRASHANT MORALES            RADIOLOGY & ADDITIONAL STUDIES:    LABS:                        10.8   6.08  )-----------( 327      ( 31 Jul 2019 07:03 )             32.9     07-31    135  |  97<L>  |  10.0  ----------------------------<  130<H>  3.8   |  27.0  |  0.56    Ca    8.1<L>      31 Jul 2019 07:03  Phos  3.1     07-31  Mg     1.7     07-31          Assessment and Plan:  Elderly male in [facility] facility following surgery for fx'd left hip in April. This AM noted to be increasingly lethargic/weak with rapid pulse and decreased pulse Ox. In ER he had a low grade fever/rapid atrial flutter on ekg and abnl cxr compatible with PNA. PMH+ for endocarditis in 2013. Underwent a TAVR in 2016 for severe aortic stenosis. Patient is Rastafari  Aflutter: converted. not a candidate for full AC given age and hematuria. continue witth current meds. Please reconsult if need arises.   TAVR: on aspirin
PRASHANT MORALES Male 89y [id number]    Patient is a 89y old  Male who presents with a chief complaint of Fever (28 Jul 2019 14:49)      Subjective/objective:  Pt seen and examined at bedside, no over night event reported by night staff. Pt resting comfortably in bed. NAD noted.       PHYSICAL EXAM:    Vital Signs Last 24 Hrs  T(C): 36.6 (29 Jul 2019 10:28), Max: 37.1 (28 Jul 2019 23:55)  T(F): 97.9 (29 Jul 2019 10:28), Max: 98.7 (28 Jul 2019 23:55)  HR: 77 (29 Jul 2019 10:28) (77 - 97)  BP: 168/77 (29 Jul 2019 10:28) (149/706 - 168/77)  BP(mean): --  RR: 18 (29 Jul 2019 10:28) (18 - 18)  SpO2: 97% (29 Jul 2019 10:28) (95% - 97%)      PHYSICAL EXAM:   GENERAL: NAD  HEAD:  Atraumatic, Normocephalic  EYES: EOMI, PERRLA, conjunctiva and sclera clear  ENMT: Moist mucous membranes  NECK: Supple, No JVD  NERVOUS SYSTEM:  Alert & Oriented X2, slightly confused, non focal  CHEST/LUNG: Clear to auscultation bilaterally; No rales, rhonchi, wheezing, or rubs  HEART: Regular rate and rhythm; No murmurs, rubs, or gallops  ABDOMEN: Soft, Nontender, Nondistended; Bowel sounds present, Yu draining blood tinged urine w small clots.   EXTREMITIES:  2+ Peripheral Pulses, No clubbing, cyanosis, or edema      MEDICATIONS  (STANDING):  aspirin  chewable 81 milliGRAM(s) Oral daily  chlorhexidine 2% Cloths 1 Application(s) Topical daily  docusate sodium 100 milliGRAM(s) Oral three times a day  ertapenem  IVPB 1000 milliGRAM(s) IV Intermittent every 24 hours  escitalopram 20 milliGRAM(s) Oral daily  hydrALAZINE 25 milliGRAM(s) Oral every 8 hours  memantine 10 milliGRAM(s) Oral daily  metoprolol tartrate 25 milliGRAM(s) Oral four times a day  saccharomyces boulardii 250 milliGRAM(s) Oral two times a day  tamsulosin 0.8 milliGRAM(s) Oral at bedtime    MEDICATIONS  (PRN):  acetaminophen   Tablet .. 650 milliGRAM(s) Oral every 6 hours PRN Temp greater or equal to 38C (100.4F)  diltiazem Injectable 10 milliGRAM(s) IV Push every 6 hours PRN SBP > 160  hydrALAZINE Injectable 10 milliGRAM(s) IV Push every 6 hours PRN for SBP > 150        Labs:  LABS:                        9.7    7.02  )-----------( 253      ( 29 Jul 2019 08:33 )             29.9     07-29    139  |  101  |  14.0  ----------------------------<  126<H>  3.6   |  28.0  |  0.56    Ca    7.8<L>      29 Jul 2019 08:33  Phos  2.8     07-29  Mg     1.9     07-29
PRASHANT MROALES Male 89y [id number]    Patient is a 89y old  Male who presents with a chief complaint of Fever (28 Jul 2019 14:49)      Subjective/objective:  Pt seen and examined at bedside. Pt mouth breathing today with desats to 70's on NC. When nose breathes sats go up. Does not have any complaints. Hematuria much improved since yesterday      PHYSICAL EXAM:    Vital Signs Last 24 Hrs  T(C): 36.6 (29 Jul 2019 10:28), Max: 37.1 (28 Jul 2019 23:55)  T(F): 97.9 (29 Jul 2019 10:28), Max: 98.7 (28 Jul 2019 23:55)  HR: 77 (29 Jul 2019 10:28) (77 - 97)  BP: 168/77 (29 Jul 2019 10:28) (149/706 - 168/77)  BP(mean): --  RR: 18 (29 Jul 2019 10:28) (18 - 18)  SpO2: 97% (29 Jul 2019 10:28) (95% - 97%)      PHYSICAL EXAM:   GENERAL: NAD  HEAD:  Atraumatic, Normocephalic  EYES: EOMI, PERRLA, conjunctiva and sclera clear  ENMT: Moist mucous membranes  NECK: Supple, No JVD  NERVOUS SYSTEM:  Alert & Oriented X2, slightly confused, non focal  CHEST/LUNG: Clear to auscultation bilaterally; No rales, rhonchi, wheezing, or rubs  HEART: Regular rate and rhythm; No murmurs, rubs, or gallops  ABDOMEN: Soft, Nontender, Nondistended; Bowel sounds present, Yu draining blood tinged urine w small clots.   EXTREMITIES:  2+ Peripheral Pulses, No clubbing, cyanosis, or edema      MEDICATIONS  (STANDING):  aspirin  chewable 81 milliGRAM(s) Oral daily  chlorhexidine 2% Cloths 1 Application(s) Topical daily  docusate sodium 100 milliGRAM(s) Oral three times a day  ertapenem  IVPB 1000 milliGRAM(s) IV Intermittent every 24 hours  escitalopram 20 milliGRAM(s) Oral daily  hydrALAZINE 25 milliGRAM(s) Oral every 8 hours  memantine 10 milliGRAM(s) Oral daily  metoprolol tartrate 25 milliGRAM(s) Oral four times a day  saccharomyces boulardii 250 milliGRAM(s) Oral two times a day  tamsulosin 0.8 milliGRAM(s) Oral at bedtime    MEDICATIONS  (PRN):  acetaminophen   Tablet .. 650 milliGRAM(s) Oral every 6 hours PRN Temp greater or equal to 38C (100.4F)  diltiazem Injectable 10 milliGRAM(s) IV Push every 6 hours PRN SBP > 160  hydrALAZINE Injectable 10 milliGRAM(s) IV Push every 6 hours PRN for SBP > 150        Labs:  LABS:                        9.7    7.02  )-----------( 253      ( 29 Jul 2019 08:33 )             29.9     07-29    139  |  101  |  14.0  ----------------------------<  126<H>  3.6   |  28.0  |  0.56    Ca    7.8<L>      29 Jul 2019 08:33  Phos  2.8     07-29  Mg     1.9     07-29
Patient seen and examined doing well no acute events    Vital Signs Last 24 Hrs  T(C): 36.4 (26 Jul 2019 08:14), Max: 37.1 (26 Jul 2019 00:26)  T(F): 97.5 (26 Jul 2019 08:14), Max: 98.8 (26 Jul 2019 00:26)  HR: 70 (26 Jul 2019 08:00) (68 - 84)  BP: 147/91 (26 Jul 2019 08:00) (131/53 - 186/84)  BP(mean): 76 (26 Jul 2019 04:32) (76 - 93)  RR: 22 (26 Jul 2019 08:00) (16 - 29)  SpO2: 96% (26 Jul 2019 08:00) (92% - 98%)    Gen: NAD  HEENT: NCFREDIS  Abd: Soft  : Yu with some clear red urine                          10.5   5.22  )-----------( 160      ( 26 Jul 2019 05:19 )             32.6     07-26    135  |  103  |  30.0<H>  ----------------------------<  104  3.4<L>   |  20.0<L>  |  0.75    Ca    8.3<L>      26 Jul 2019 05:19  Mg     2.1     07-25
Patient seen and examined doing well no acute events    Vital Signs Last 24 Hrs  T(C): 36.7 (30 Jul 2019 08:30), Max: 36.7 (30 Jul 2019 08:30)  T(F): 98 (30 Jul 2019 08:30), Max: 98 (30 Jul 2019 08:30)  HR: 71 (30 Jul 2019 11:03) (68 - 73)  BP: 165/72 (30 Jul 2019 11:03) (158/70 - 185/72)  BP(mean): --  RR: 18 (30 Jul 2019 11:03) (18 - 18)  SpO2: 92% (30 Jul 2019 11:03) (92% - 95%)    Gen: NAD  HEENT: NCAT  Abd: Soft  : Yu draining clear urine                          10.4   7.20  )-----------( 291      ( 30 Jul 2019 07:59 )             32.2     07-30    136  |  98  |  12.0  ----------------------------<  111  3.9   |  27.0  |  0.49<L>    Ca    8.0<L>      30 Jul 2019 07:59  Phos  2.9     07-30  Mg     1.7     07-30
Patient: PRASHANT MORALES [id number] 89y Male                           Internal Medicine Hospitalist Progress Note    Initial HPI:  [facility] MD: Dr. Shearer  89y Male PMH Macular Degenerative Disease, AS s/p TAVR, GI bleed, HLD, Depression, BPH, EF 65-70% & Presybeterian, s/p L Femoral Shaft Fracture s/p IMN 2019, sent from [facility] for fever and persistent cough.  Per MD at [facility], pt had been treated for cough, started on Zosyn x 4 days with no improvement.  Noted fever today and transferred to ED for further evaluation.  He admits to generalized fatigue, denies SOB.  Noted in Afib / Aflutter with RVR, controlled with Cardizem and BBlocker.  RVP + for parainfluenza.  CXR showed worsening infiltrate.  SaO2 dropped to 88% on 50% VM and pt placed on high flow O2.  ICU consulted and pt transferred to Stepdown.  He responded to IV Abx.  Noted with hematuria and urinary retention, evaluated by urology and underwent cystoscopy.  Yu placed over guidewire.     Interval History:  Now in NSR.  Yu in place with blood tinged urine.  He denies pain.  no fever / chills.  No SOB.  On O2 via nc.     ____________________PHYSICAL EXAM:  Vitals reviewed as indicated below  GENERAL:  NAD Alert and Oriented x 2 to person, place. Slightly confused, unchanged.   HEENT: NCAT  CARDIOVASCULAR:  S1, S2  LUNGS: b/l rhonchi.   ABDOMEN:  soft, (-) tenderness, (-) distension, (+) bowel sounds, (-) guarding, (-) rebound (-) rigidity  EXTREMITIES:  no cyanosis / clubbing / edema.   : Yu in place.  ____________________    VITALS:  Vital Signs Last 24 Hrs  T(C): 36.4 (2019 08:14), Max: 37.1 (2019 00:26)  T(F): 97.5 (2019 08:14), Max: 98.8 (2019 00:26)  HR: 70 (2019 08:00) (68 - 84)  BP: 147/91 (2019 08:00) (131/53 - 186/84)  BP(mean): 76 (2019 04:32) (76 - 93)  RR: 22 (2019 08:00) (16 - 29)  SpO2: 96% (2019 08:00) (92% - 98%) Daily     Daily Weight in k.5 (2019 05:16)  CAPILLARY BLOOD GLUCOSE        I&O's Summary    2019 07:01  -  2019 07:00  --------------------------------------------------------  IN: 1615 mL / OUT: 1110 mL / NET: 505 mL        LABS:                        10.5   5.22  )-----------( 160      ( 2019 05:19 )             32.6     07    135  |  103  |  30.0<H>  ----------------------------<  104  3.4<L>   |  20.0<L>  |  0.75    Ca    8.3<L>      2019 05:19  Mg     2.1           PT/INR - ( 2019 19:17 )   PT: 13.5 sec;   INR: 1.17 ratio         PTT - ( 2019 19:17 )  PTT:28.6 sec            Culture - Blood (collected 2019 08:58)  Source: .Blood  Preliminary Report (2019 14:57):    Growth in anaerobic bottle: Gram Positive Cocci in Clusters    Anaerobic Bottle: 1 day;04:50 Hours to positivity    Aerobic Bottle: No growth to date    .    ***Blood Panel PCR results on this specimen are available    approximately 3 hours after the Gram stain result.***    Gram stain, PCR, and/or culture results may not always    correspond due to difference in methodologies.    ************************************************************    This PCR assay was performed using [employer].    The following targets are tested for: Enterococcus,    vancomycin resistant enterococci, Listeria monocytogenes,    coagulase negative staphylococci, S. aureus,    methicillin resistant S. aureus, Streptococcus agalactiae    (Group B), S. pneumoniae, S. pyogenes (Group A),    Acinetobacter baumannii, Enterobacter cloacae, E. coli,    Klebsiella oxytoca, K. pneumoniae, Proteus sp.,    Serratia marcescens, Haemophilus influenzae,    Neisseria meningitidis, Pseudomonas aeruginosa, Candida    albicans, C. glabrata, C krusei, C parapsilosis,    C. tropicalis and the KPC resistance gene.    "Due to technical problems, Proteus sp. will Not be reported as part of    the BCID panel until further notice"    .    TYPE: (C=Critical, N=Notification, A=Abnormal) C    TESTS:  _ BLD GS    DATE/TIME CALLED: _ 2019 14:56:36    CALLED TO: Babak STOCK RN    READ BACK (2 Patient Identifiers)(Y/N): _ Y    READ BACK VALUES (Y/N): _ Y    CALLED BY: Babak WEAVER  Organism: Blood Culture PCR (2019 16:04)  Organism: Blood Culture PCR (2019 16:04)        MEDICATIONS:  acetaminophen   Tablet .. 650 milliGRAM(s) Oral every 6 hours PRN  apixaban 2.5 milliGRAM(s) Oral every 12 hours  aspirin  chewable 81 milliGRAM(s) Oral daily  diltiazem Injectable 10 milliGRAM(s) IV Push every 6 hours PRN  docusate sodium 100 milliGRAM(s) Oral three times a day  ertapenem  IVPB 1000 milliGRAM(s) IV Intermittent every 24 hours  escitalopram 20 milliGRAM(s) Oral daily  memantine 10 milliGRAM(s) Oral daily  metoprolol tartrate 25 milliGRAM(s) Oral four times a day  morphine  - Injectable 1 milliGRAM(s) IV Push once  saccharomyces boulardii 250 milliGRAM(s) Oral two times a day  tamsulosin 0.8 milliGRAM(s) Oral at bedtime
[facility]  INFECTIOUS DISEASES AND INTERNAL MEDICINE at [place]  =======================================================  Jossue Doyle MD [facility] Up MD  Diplomates American Board of Internal Medicine and Infectious Diseases  Telephone [phone]  Fax            [phone]  =======================================================    [id number]  PRASHANT MORALES   follow up for:  parainfluenza virus infection, Pneumonia  patient seen and examined.     appears better  No longer on supplemental oxygen,   eating breakfast    ===================================================  REVIEW OF SYSTEMS:  no SOB   no fevers    =======================================================  Allergies  No Known Allergies    Antibiotics:  ertapenem  IVPB 1000 milliGRAM(s) IV Intermittent every 24 hours         ======================================================  Physical Exam:  ============  T(F): 97.5 (26 Jul 2019 08:14), Max: 98.8 (26 Jul 2019 00:26)  HR: 70 (26 Jul 2019 08:00)  BP: 147/91 (26 Jul 2019 08:00)  RR: 22 (26 Jul 2019 08:00)  SpO2: 96% (26 Jul 2019 08:00) (92% - 98%)  temp max in last 48H T(F): , Max: 100.7 (07-24-19 @ 18:11)    General:  No acute distress. THIN FRAIL  Eye: Pupils are equal, round and reactive to light, Extraocular movements are intact, Normal conjunctiva.  HENT: Normocephalic, Oral mucosa is DRY  Neck: Supple, No lymphadenopathy.  Respiratory: Lungs WITH COARSE BREATH SOUNDS BILATERALLY, improving  Cardiovascular: Normal rate, Regular rhythm,   Gastrointestinal: Soft, Non-tender, Non-distended, Normal bowel sounds.  Genitourinary: No costovertebral angle tenderness.  Lymphatics: No lymphadenopathy neck,   Musculoskeletal: Normal range of motion, Normal strength.  Integumentary: MOTTLED SKIN ON LOWER EXTREMITIES - resolved  Neurologic: Alert, Oriented, No focal deficits, Cranial Nerves II-XII are grossly intact.  Psychiatric: PLEASANT      =======================================================  Labs:                        10.5   5.22  )-----------( 160      ( 26 Jul 2019 05:19 )             32.6       WBC Count: 5.22 K/uL (07-26-19 @ 05:19)  WBC Count: 6.58 K/uL (07-25-19 @ 19:17)  WBC Count: 5.25 K/uL (07-25-19 @ 04:55)  WBC Count: 6.44 K/uL (07-24-19 @ 08:50)      07-26    135  |  103  |  30.0<H>  ----------------------------<  104  3.4<L>   |  20.0<L>  |  0.75    Ca    8.3<L>      26 Jul 2019 05:19  Mg     2.1     07-25        Culture - Blood (collected 07-24-19 @ 08:59)  Source: .Blood    Culture - Blood (collected 07-24-19 @ 08:58)  Source: .Blood  Organism: Coag Negative Staphylococcus  Blood Culture PCR (07-26-19 @ 09:11)  Organism: Coag Negative Staphylococcus (07-26-19 @ 09:11)    Sensitivities:      -  Ampicillin/Sulbactam: R <=8/4      -  Cefazolin: R <=4      -  Clindamycin: S <=0.5      -  Erythromycin: R >4      -  Gentamicin: S <=4 Should not be used as monotherapy      -  Oxacillin: R >2      -  Penicillin: R >8      -  RIF- Rifampin: S <=1 Should not be used as monotherapy      -  Tetra/Doxy: S <=4      -  Trimethoprim/Sulfamethoxazole: S <=0.5/9.5      -  Vancomycin: S 1      Method Type: JAKUB  Organism: Blood Culture PCR (07-25-19 @ 16:04)    Sensitivities:      -  Coagulase negative Staphylococcus: Detec      Method Type: PCR
[facility], [facility]                              [address] [place] [address]                                                 PHONE: [phone]                                                 FAX: [phone]  -----------------------------------------------------------------------------------------------  Pt seen and examined. FU for AF    Overnight events/Complaints: Pt without complains. Son at bedside reports pt has been doing better. Still has mild hematuria.    Vital Signs Last 24 Hrs  T(C): 36.6 (29 Jul 2019 10:28), Max: 37.1 (28 Jul 2019 23:55)  T(F): 97.9 (29 Jul 2019 10:28), Max: 98.7 (28 Jul 2019 23:55)  HR: 77 (29 Jul 2019 10:28) (77 - 97)  BP: 168/77 (29 Jul 2019 10:28) (149/706 - 168/77)  BP(mean): --  RR: 18 (29 Jul 2019 10:28) (18 - 18)  SpO2: 97% (29 Jul 2019 10:28) (95% - 97%)  I&O's Summary    28 Jul 2019 07:01  -  29 Jul 2019 07:00  --------------------------------------------------------  IN: 240 mL / OUT: 1000 mL / NET: -760 mL    29 Jul 2019 07:01  -  29 Jul 2019 16:09  --------------------------------------------------------  IN: 0 mL / OUT: 860 mL / NET: -860 mL        PHYSICAL EXAM:  Constitutional: Appears well developed, well nourished; alert and co-operative  HEENT:     Head: Normocephalic and atraumatic  Neck: supple. No JVD  Cardiovascular: regular S1 S2 + ESM  Respiratory: Lungs clear to auscultation; no crepitations, no wheeze  Gastrointestinal:  Soft, Non-tender, + BS	  Musculoskeletal: Normal range of motion. No edema  Skin: Warm and dry. No cyanosis . No diaphoresis.  Neurologic: Alert oriented to time place and person. Normal strength and no tremor.        LABS:                        9.7    7.02  )-----------( 253      ( 29 Jul 2019 08:33 )             29.9     07-29    139  |  101  |  14.0  ----------------------------<  126<H>  3.6   |  28.0  |  0.56    Ca    7.8<L>      29 Jul 2019 08:33  Phos  2.8     07-29  Mg     1.9     07-29      RADIOLOGY & ADDITIONAL STUDIES: (reviewed)    CARDIOLOGY TESTING:(reviewed)     TELEMETRY (Independent visualization) : NSR    ECHOCARDIOGRAM:  < from: TTE Echo Complete w/Doppler (04.27.19 @ 08:59) >  PHYSICIAN INTERPRETATION:  Left Ventricle:  Left ventricular ejection fraction, by visual estimation, is 65 to 70%.   Spectral Doppler shows impaired relaxation pattern of left ventricular   myocardial filling(Grade I diastolic dysfunction).  Right Ventricle: The right ventricle was not well visualized. Grossly   appears to have preserved systolic function.  Left Atrium: The left atrium was not well visualized.  Right Atrium: The right atrium was not wellvisualized.  Pericardium: Trivial pericardial effusion is present.  Mitral Valve: Mild thickening of the anterior and posterior mitral valve   leaflets. Mitral leaflet mobility is normal. There is mild mitral annular   calcification. No evidence of mitral valve regurgitation is seen.  Tricuspid Valve: The tricuspid valve is not well seen.  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   The prosthesis appears stable without evidence for rocking motion. The   leaflets arenot well visualized. There is mild to moderate paravalvular   regurgitation seen adjacent to the interventricular septum. Peak velocity   = 1.3 m/s with a mean gradient of 3 mmHg. Study quality suboptimal for   EOA and dimensionless index calculations. No evidence for significant   aortic stenosis.  Pulmonic Valve: The pulmonic valve was not well visualized. Trace   pulmonic valve regurgitation.  Aorta: The aortic root is normal in size and structure.  Pulmonary Artery: The pulmonary artery is not well seen.  Venous: The pulmonary veins were not well visualized. The inferior vena   cava is not well visualized.       Summary:   1. Left ventricular ejection fraction, by visual estimation, is 65 to   70%.   2. Spectral Doppler shows impaired relaxation pattern of left   ventricular myocardial filling (Grade I diastolic dysfunction).   3. There is mild concentric left ventricular hypertrophy.   4. TAVR in the aortic position with mild to moderate paravalvular   regurgitation.   5. NO pericardial effusion.   6. ** Compared to TTE dated 10/8/16, slightly more aortic paravalvular   regurgitation is seen.    Catherine Sears DO Electronically signed on 4/27/2019 at 2:02:54 PM    < end of copied text >    MEDICATIONS:(reviewed)  MEDICATIONS  (STANDING):  aspirin  chewable 81 milliGRAM(s) Oral daily  chlorhexidine 2% Cloths 1 Application(s) Topical daily  docusate sodium 100 milliGRAM(s) Oral three times a day  ertapenem  IVPB 1000 milliGRAM(s) IV Intermittent every 24 hours  escitalopram 20 milliGRAM(s) Oral daily  hydrALAZINE 25 milliGRAM(s) Oral every 8 hours  memantine 10 milliGRAM(s) Oral daily  metoprolol tartrate 25 milliGRAM(s) Oral four times a day  saccharomyces boulardii 250 milliGRAM(s) Oral two times a day  tamsulosin 0.8 milliGRAM(s) Oral at bedtime      ASSESSMENT AND PLAN:    1. 89 yo male with PNA-RUL on cxr. Pt transferred from [facility]  2. rapid atrial flutter. Now in NSR. Given age, transient PAF/PAFL in the setting of PNA and persistent hematuria, will opt for ASA only  3. Continue telemetry monitoring  4. Continue metoprolol with holding parameters for BP    d/w son at bedside in detail.
[facility]  INFECTIOUS DISEASES AND INTERNAL MEDICINE at [place]  =======================================================  Jossue Up MD  Diplomates American Board of Internal Medicine and Infectious Diseases  Tel: [phone]      Fax: [phone]  =======================================================    PRASHANT MORALES [id number]    Follow up: parainfluenza virus infection, Pneumonia    No complaints  No fevers or chills  No diarrhea  No abdominal pain      Allergies:  No Known Allergies      Antibiotics:  ertapenem  IVPB 1000 milliGRAM(s) IV Intermittent every 24 hours       REVIEW OF SYSTEMS:  CONSTITUTIONAL:  No Fever or chills  HEENT:   No diplopia or blurred vision.  No earache, sore throat or runny nose.  CARDIOVASCULAR:  No pressure, squeezing, strangling, tightness, heaviness or aching about the chest, neck, axilla or epigastrium.  RESPIRATORY:  No cough, shortness of breath  GASTROINTESTINAL:  No nausea, vomiting or diarrhea.  GENITOURINARY:  + Yu  MUSCULOSKELETAL:  no joint aches, no muscle pain  SKIN:  No change in skin, hair or nails.  NEUROLOGIC:  No Headaches, seizures or weakness.  PSYCHIATRIC:  No disorder of thought or mood.  ENDOCRINE:  No heat or cold intolerance  HEMATOLOGICAL:  No easy bruising or bleeding.       Physical Exam:  Vital Signs Last 24 Hrs  T(C): 36.6 (29 Jul 2019 10:28), Max: 37.1 (28 Jul 2019 23:55)  T(F): 97.9 (29 Jul 2019 10:28), Max: 98.7 (28 Jul 2019 23:55)  HR: 77 (29 Jul 2019 10:28) (77 - 97)  BP: 168/77 (29 Jul 2019 10:28) (149/706 - 168/77)  RR: 18 (29 Jul 2019 10:28) (18 - 18)  SpO2: 97% (29 Jul 2019 10:28) (95% - 97%)      GEN: NAD, pleasant  HEENT: normocephalic and atraumatic. EOMI. PERRL.  Anicteric   NECK: Supple.   LUNGS: Clear to auscultation.  HEART: Regular rate and rhythm   ABDOMEN: Soft, nontender, and nondistended.  Positive bowel sounds.    : No CVA tenderness  EXTREMITIES: Without any edema.  MSK: no joint swelling  NEUROLOGIC: Awake alert, following commands   PSYCHIATRIC: Appropriate affect .  SKIN: No Rash      Labs:  07-29    139  |  101  |  14.0  ----------------------------<  126<H>  3.6   |  28.0  |  0.56    Ca    7.8<L>      29 Jul 2019 08:33  Phos  2.8     07-29  Mg     1.9     07-29               9.7    7.02  )-----------( 253      ( 29 Jul 2019 08:33 )             29.9       RECENT CULTURES:  07-25 @ 19:17 .Blood     No growth at 48 hours      07-24 @ 10:05    RVP  Detected - Parainfluenza       07-24 @ 08:59 .Blood     No growth at 5 days.      07-24 @ 08:58 .Blood Coag Negative Staphylococcus  Blood Culture PCR    Growth in anaerobic bottle: Coag Negative Staphylococcus  Anaerobic Bottle: 1 day;04:50 Hours to positivity  Aerobic Bottle: No growth at 5 days.  ***Blood Panel PCR results on this specimen are available  approximately 3 hours after the Gram stain result.***  Gram stain, PCR, and/or culture results may not always  correspond due to difference in methodologies.  ************************************************************  This PCR assay was performed using [employer].  The following targets are tested for: Enterococcus,  vancomycin resistant enterococci, Listeria monocytogenes,  coagulase negative staphylococci, S. aureus,  methicillin resistant S. aureus, Streptococcus agalactiae  (Group B), S. pneumoniae, S. pyogenes (Group A),  Acinetobacter baumannii, Enterobacter cloacae, E. coli,  Klebsiella oxytoca, K. pneumoniae, Proteus sp.,  Serratia marcescens, Haemophilus influenzae,  Neisseria meningitidis, Pseudomonas aeruginosa, Candida  albicans, C. glabrata, C krusei, C parapsilosis,  C. tropicalis and the KPC resistance gene.  "Due to technical problems, Proteus sp. will Not be reported as part of  the BCID panel until further notice"

## 2019-07-31 NOTE — DISCHARGE NOTE PROVIDER - CARE PROVIDER_API CALL
Clarence Singh (DO)  Surgery  [address] [place] [address]  Phone: [phone]  Fax: [phone]  Follow Up Time:     Иавн Stephenson)  Cardiovascular Disease; Internal Medicine; Nuclear Cardiology  [address] [place] [address]  Phone: [phone]  Fax: [phone]  Follow Up Time:

## 2019-07-31 NOTE — DISCHARGE NOTE PROVIDER - NSDCCPCAREPLAN_GEN_ALL_CORE_FT
PRINCIPAL DISCHARGE DIAGNOSIS  Diagnosis: HCAP (healthcare-associated pneumonia)  Assessment and Plan of Treatment: Completed course of antibiotics      SECONDARY DISCHARGE DIAGNOSES  Diagnosis: GERD (gastroesophageal reflux disease)  Assessment and Plan of Treatment: Resume home meds    Diagnosis: Dementia  Assessment and Plan of Treatment: Resume home meds    Diagnosis: Urinary retention  Assessment and Plan of Treatment: Continue waddell catheter. Trial of void as outpatient with urology office    Diagnosis: Atrial flutter with rapid ventricular response  Assessment and Plan of Treatment: Stable. Resume rate controlling meds as prescribed. Anticoagulation with aspirin 81mg orally once a day only. Follow up with outpatient cardiologist on discharge. PRINCIPAL DISCHARGE DIAGNOSIS  Diagnosis: HCAP (healthcare-associated pneumonia)  Assessment and Plan of Treatment: Completed course of antibiotics      SECONDARY DISCHARGE DIAGNOSES  Diagnosis: GERD (gastroesophageal reflux disease)  Assessment and Plan of Treatment: Resume home meds    Diagnosis: Dementia  Assessment and Plan of Treatment: Resume home meds    Diagnosis: Urinary retention  Assessment and Plan of Treatment: Continue waddell catheter. Trial of void as outpatient with urology office. Contact info provided.    Diagnosis: Atrial flutter with rapid ventricular response  Assessment and Plan of Treatment: Stable. Resume rate controlling meds as prescribed. Anticoagulation with aspirin 81mg orally once a day only. Follow up with outpatient cardiologist on discharge. PRINCIPAL DISCHARGE DIAGNOSIS  Diagnosis: HCAP (healthcare-associated pneumonia)  Assessment and Plan of Treatment: Completed course of antibiotics. Wean NC o2 as tolerated      SECONDARY DISCHARGE DIAGNOSES  Diagnosis: GERD (gastroesophageal reflux disease)  Assessment and Plan of Treatment: Resume home meds    Diagnosis: Dementia  Assessment and Plan of Treatment: Resume home meds    Diagnosis: Urinary retention  Assessment and Plan of Treatment: Continue waddell catheter. Trial of void as outpatient with urology office. Contact info provided.    Diagnosis: Atrial flutter with rapid ventricular response  Assessment and Plan of Treatment: Stable. Resume rate controlling meds as prescribed. Anticoagulation with aspirin 81mg orally once a day only. Follow up with outpatient cardiologist on discharge. PRINCIPAL DISCHARGE DIAGNOSIS  Diagnosis: HCAP (healthcare-associated pneumonia)  Assessment and Plan of Treatment: Completed course of antibiotics. Wean NC o2 as tolerated      SECONDARY DISCHARGE DIAGNOSES  Diagnosis: GERD (gastroesophageal reflux disease)  Assessment and Plan of Treatment: Resume home meds    Diagnosis: Dementia  Assessment and Plan of Treatment: Resume home meds    Diagnosis: Urinary retention  Assessment and Plan of Treatment: Continue waddell catheter. DO NOT revmove Waddell cathater until seen by urology. .Trial of void as outpatient with urology office. Contact info provided.    Diagnosis: Atrial flutter with rapid ventricular response  Assessment and Plan of Treatment: Stable. Resume rate controlling meds as prescribed. Anticoagulation with aspirin 81mg orally once a day only. Follow up with outpatient cardiologist on discharge.

## 2019-08-01 ENCOUNTER — APPOINTMENT (OUTPATIENT)
Dept: INTERNAL MEDICINE | Facility: CLINIC | Age: 84
End: 2019-08-01

## 2019-08-05 ENCOUNTER — APPOINTMENT (OUTPATIENT)
Dept: CARE COORDINATION | Facility: HOME HEALTH | Age: 84
End: 2019-08-05

## 2019-08-05 DIAGNOSIS — K21.9 GASTRO-ESOPHAGEAL REFLUX DISEASE W/OUT ESOPHAGITIS: ICD-10-CM

## 2019-08-05 DIAGNOSIS — F32.9 MAJOR DEPRESSIVE DISORDER, SINGLE EPISODE, UNSPECIFIED: ICD-10-CM

## 2019-08-05 DIAGNOSIS — H35.30 UNSPECIFIED MACULAR DEGENERATION: ICD-10-CM

## 2019-08-05 DIAGNOSIS — G47.00 INSOMNIA, UNSPECIFIED: ICD-10-CM

## 2019-08-06 PROBLEM — K21.9 GERD (GASTROESOPHAGEAL REFLUX DISEASE): Status: ACTIVE | Noted: 2019-08-06

## 2019-08-06 PROBLEM — F32.9 DEPRESSION, CONTROLLED: Status: ACTIVE | Noted: 2019-08-06

## 2019-08-06 PROBLEM — H35.30 MACULAR DEGENERATION: Status: ACTIVE | Noted: 2019-08-06

## 2019-08-06 PROBLEM — G47.00 INSOMNIA, UNSPECIFIED TYPE: Status: ACTIVE | Noted: 2019-08-06

## 2019-08-06 RX ORDER — CHLORHEXIDINE GLUCONATE 4 %
5 LIQUID (ML) TOPICAL
Refills: 0 | Status: ACTIVE | COMMUNITY

## 2019-08-06 RX ORDER — ESCITALOPRAM OXALATE 20 MG/1
20 TABLET, FILM COATED ORAL DAILY
Refills: 0 | Status: ACTIVE | COMMUNITY

## 2019-08-06 RX ORDER — MEMANTINE HYDROCHLORIDE 28 MG/1
28 CAPSULE, EXTENDED RELEASE ORAL DAILY
Refills: 0 | Status: ACTIVE | COMMUNITY
Start: 2019-08-06

## 2019-08-06 RX ORDER — ELECTROLYTES/DEXTROSE
SOLUTION, ORAL ORAL DAILY
Qty: 30 | Refills: 2 | Status: ACTIVE | COMMUNITY
Start: 2019-08-06

## 2019-08-14 ENCOUNTER — APPOINTMENT (OUTPATIENT)
Dept: INTERNAL MEDICINE | Facility: CLINIC | Age: 84
End: 2019-08-14
Payer: MEDICARE

## 2019-08-14 VITALS
BODY MASS INDEX: 23.95 KG/M2 | DIASTOLIC BLOOD PRESSURE: 50 MMHG | WEIGHT: 149 LBS | SYSTOLIC BLOOD PRESSURE: 120 MMHG | HEIGHT: 66 IN

## 2019-08-14 DIAGNOSIS — J18.9 PNEUMONIA, UNSPECIFIED ORGANISM: ICD-10-CM

## 2019-08-14 DIAGNOSIS — R31.0 GROSS HEMATURIA: ICD-10-CM

## 2019-08-14 PROCEDURE — 99358 PROLONG SERVICE W/O CONTACT: CPT

## 2019-08-14 PROCEDURE — 99215 OFFICE O/P EST HI 40 MIN: CPT | Mod: 25

## 2019-08-14 PROCEDURE — 36415 COLL VENOUS BLD VENIPUNCTURE: CPT

## 2019-08-14 NOTE — DATA REVIEWED
[FreeTextEntry1] : \par Extensive medical records were reviewed both both hospitalizations for fracture and pneumonia and consultation progress notes and laboratory data prior to evaluation of the patient \par In addition extensive time was also spent in reviewing diagnostic studies.\par \par The duration of the review took 35 minutes\par \par \par \par \par Hospital Course:\par Discharge Date 01-May-2019\par Admission Date 27-Apr-2019 02:03\par Medication Reconciliation Status Admission Reconciliation is Completed\par Discharge Reconciliation is Completed\par Hospital Course \par 89y Male BIB BLS s/p witnessed (by son) slip and ground level fall on April\par 4/27. Per patient, he denied HS nor LOC. He also denied dizziness or\par lightheadedness prior to the fall. His only complaint was that of L hip/upper\par leg pain. Xray showed a L proximal femoral shaft fracture. Patient was\par subsequently taken to the OR by ortho for IMN of L femur. Patient was\par recovering well postoperatively but noted to become anemic, Hb dropping to 7.\par Patient was seen by HemOnc since he is of the [employer] carlos and would\par not accept blood products. HemOnc was started on Venofer/epogen, iron and\par folate supplements. Patient was evaluated by PT/OT and PMR who determined in\par accordance with patient's family wishes that the patient be discharged to [facility].\par Per Ortho patient is weight bearing as tolerated to LLE. Patient to follow up\par with Urology since CT A/P showed an enlarged heterogenous prostate gland with\par irregular nodular contour measuring 7.5 cm transversely.\par \par Care Plan/Procedures:\par Goal(s) To get better and follow your care plan as instructed.\par Discharge Diagnoses, Assessment and Plan of Treatment PRINCIPAL DISCHARGE\par DIAGNOSIS\par Diagnosis: Femur fracture, left\par Assessment and Plan of Treatment:\par \par Follow Up:\par Care Providers for Follow up (PCP/Outpatient Provider) Brady Mckeon (MD)\par Orthopaedic Surgery\par [address]\[place] [address]\par Phone: [phone]\par Fax: [phone]\par Follow Up Time:\par \par Jaime Fleming)\par Hematology; Internal Medicine; Medical Oncology\par [address]\[place] [address]\par Phone: [phone]\par Fax: [phone]\par Follow Up Time:\par Activity No restrictions\par Additional Instructions Please call the numbers provided to make follow up\par appointments within 1-2 weeks from discharge. Patient may bear weight on left\par lower extremity as tolerated.\par \par Quality Measures:\par Does the patient have difficulty running errands alone like visiting a doctors\par office or shopping? Yes\par Does the patient have difficulty climbing stairs? Yes\par Patient Condition Stable\par Hospice Patient No\par Cognition: The patient has Difficulty remembering\par Did the patient present with or suffer from an ischemic stroke, hemorrhagic\par stroke or TIA during this admission? No\par Has the patient had an Acute Myocardial Infarction? No\par Has the patient had a Percutaneous Coronary Intervention? No\par \par Document Complete:\par Care Provider Seen in [facility]\par Physician Section Complete This document is complete and the patient is ready\par for discharge.\par For questions about your prescriptions, please call: [phone]\par Is this contact telephone number correct? Yes\par \par \par Electronic Signatures for Addendum Section:\Brady Barraza () (Signed Addendum 09-May-2019 18:34)\par 	R hip arthroplasty dislocation not present on this patient. as per orthopedic\par attending Operative report.\par \par Electronic Signatures:\Brady Barraza () (Signed 02-May-2019 08:12)\par 	Co-Signer: Discharge Note Provider\par Sawyer Wright) (Signed 01-May-2019 14:43)\par 	Authored: Discharge Note Provider\par \par \par \par Hospital Course:\par Discharge Date 31-Jul-2019\par Admission Date 24-Jul-2019 11:11\par Reason for Admission Fever\par Medication Reconciliation Status Admission Reconciliation is Completed\par Discharge Reconciliation is Completed\par Hospital Course \par 89y Male PMH Macular Degenerative Disease, AS s/p TAVR, GI bleed, HLD,\par Depression, BPH, EF 65-70% & Uatsdin, s/p L Femoral Shaft Fracture\par s/p IMN 5/2019, sent from [facility] for fever and persistent cough. Per MD at [facility],\par pt had been treated for cough, started on Zosyn x 4 days with no improvement.\par Noted fever today and transferred to ED for further evaluation. He admits to\par generalized fatigue, denies SOB. In ED, patient noted with new onsent Afib /\par Aflutter with RVR, controlled with Cardizem and BBlocker. I RVP + for\par parainfluenza. CXR showed worsening infiltrate. SaO2 dropped to 88% on 50% VM\par and pt placed on high flow O2. ICU consulted and pt transferred to Stepdown.\par He responded to IV Abx, clinically improved and downgraded to medical floor.\par Noted with hematuria and urinary retention, evaluated by urology and underwent\par cystoscopy. Yu placed over guidewire. Pt was initally started on eliquis\par after hematuria resolved however patient later developed recurrent hematuria\par and decision was made to continue low dose aspirin instead of Eliquis. H/H now\par stable. Hematuria now cleared. Pt to be discharged to [facility]. Pt requiring 2L NC\par \par east neck for 11 days then home as of this week

## 2019-08-14 NOTE — ADDENDUM
[FreeTextEntry1] : Discharge Reconciliation 07- 19:10 tamsulosin 0.4 mg oral capsule     once a day (at bedtime) oral [facility]      Discharge Reconciliation 07- 19:10 aspirin 81 mg oral tablet     once a day oral [facility]      Discharge Reconciliation 07- 19:10 metoprolol tartrate 25 mg oral tablet     2 times a day oral [facility]      Discharge Reconciliation 07- 19:10 memantine 28 mg oral capsule, extended release     once a day oral [facility]      Discharge Reconciliation 07- 19:10 Vitamin C 250 mg oral tablet     once a day oral [facility]      Discharge Reconciliation 07- 19:10 ubiquinone 100 mg oral capsule     once a day oral [facility]      Discharge Reconciliation 07- 19:10 escitalopram 20 mg oral tablet     once a day oral [facility]      Discharge Reconciliation 07- 19:10 melatonin 5 mg sublingual tablet     once a day (at bedtime) sublingual [facility]      Discharge Reconciliation 07- 19:10 Multiple Vitamins oral capsule     once a day oral [facility]      Discharge Reconciliation 07- 19:10 Vitamin D3 2000 intl units oral tablet     once a day oral [facility]      Discharge Reconciliation 07- 19:10 acetaminophen 325 mg oral tablet     every 6 hours As needed oral [facility]      Discharge Reconciliation 07- 19:10 famotidine 20 mg oral tablet     every 12 hours oral [facility]      Discharge Reconciliation 07- 19:10 FeroSul 325 mg (65 mg elemental iron) oral tablet     3 times a day oral [facility]      Discharge Reconciliation 07- 19:10 bisacodyl 10 mg rectal suppository     once a day As needed rectal [facility]      Discharge Reconciliation 07- 19:10 docusate sodium 100 mg oral capsule     3 times a day oral [facility]      Discharge Reconciliation 07- 19:10 folic acid 1 mg oral tablet     once a day oral [facility]      Discharge Reconciliation 07- 19:10 hydrALAZINE 100 mg oral tablet     every 8 hours oral [facility]      Admission Reconciliation 07- 11:11 aspirin 81 mg oral tablet     once a day oral [facility]      Admission Reconciliation 07- 11:11 metoprolol tartrate 25 mg oral tablet     2 times a day oral [facility]      Admission Reconciliation 07- 11:11 memantine 28 mg oral capsule, extended release     once a day oral [facility]

## 2019-08-14 NOTE — HISTORY OF PRESENT ILLNESS
[FreeTextEntry1] : Patient here for evaluation of multiple medical problems and new issues to be discussed\par Patient is status post discharge from subacute facility at the 2 admissions to [facility] for fractured femur from a fall in nursing home acquired pneumonia [de-identified] : Patient's hospital records were reviewed in its entirety and also discussed with the patient's son. The patient is now here after 2 recent hospitalizations and finally discharged home at the subacute care. He had multiple complications during the hospitalization as outlined below including pneumonia hematuria in rapid A. fib all of which has resolved.\par He is currently living at home with medicated nursing. Main issues that need to be addressed on his lethargy at home and concerns for new medications. Patient is essentially nonverbal and lethargic in the son is the historian with a questions

## 2019-08-14 NOTE — REVIEW OF SYSTEMS
[Palpitations] : palpitations [Lower Ext Edema] : lower extremity edema [Cough] : cough [Dizziness] : dizziness [Hematuria] : hematuria [Unsteady Walk] : ataxia [Confusion] : confusion [Memory Loss] : memory loss [Negative] : Heme/Lymph

## 2019-08-14 NOTE — PHYSICAL EXAM
[Cachectic] : cachexia was observed [Normal Rate] : normal rate  [Regular Rhythm] : with a regular rhythm [Normal S1, S2] : normal S1 and S2 [No Murmur] : no murmur heard [Normal] : no rash [de-identified] : No gross neurological deficits but essentially nonverbal and noncommunicative and minimally follows commands but is alert and opens eyes to command [de-identified] : Chronic dementia

## 2019-08-14 NOTE — ASSESSMENT
[FreeTextEntry1] : Patient examined and adjustments to therapy for HBP not required is 120/70 on current regimen without change and no need for further adjustments of especially based on patient's a All labs reviewed with patient as well. Review of systems and physical exam discussed with patient. Care plan for future followups discussed with patient. All issues of health for the current year discussed in depth with patient who was conversant and all relevant issues addressed.\par \par Patient not a candidate for anticoagulation based on history of fall and age and is currently on an aspirin a day which I best the son to discuss with his cardiologist.\par Patient with moderate lethargy which is probably post hospitalization and will followup with psychiatry regarding increasing the dose of modafinil\par Patient has no evidence of recurrent GI bleed and labs with the patient were reviewed and within normal limits\par Patient's 2 hospitalizations were discussed extensively with the son and all discussed. The plan is to continue aggressive care at home and to maintain current medications. Repeat labs were drawn to assess a CBC and patient will followup in 6-8 weeks\par \par patient currently in normal sinus rhythm without evidenceof any dysrhythmia

## 2019-08-15 LAB
ANION GAP SERPL CALC-SCNC: 12 MMOL/L
BASOPHILS # BLD AUTO: 0.08 K/UL
BASOPHILS NFR BLD AUTO: 1.1 %
BUN SERPL-MCNC: 16 MG/DL
CALCIUM SERPL-MCNC: 8.8 MG/DL
CHLORIDE SERPL-SCNC: 98 MMOL/L
CO2 SERPL-SCNC: 27 MMOL/L
CREAT SERPL-MCNC: 0.89 MG/DL
EOSINOPHIL # BLD AUTO: 0.47 K/UL
EOSINOPHIL NFR BLD AUTO: 6.4 %
FERRITIN SERPL-MCNC: 244 NG/ML
GLUCOSE SERPL-MCNC: 141 MG/DL
HCT VFR BLD CALC: 33.8 %
HGB BLD-MCNC: 10.4 G/DL
IMM GRANULOCYTES NFR BLD AUTO: 0.3 %
IRON SATN MFR SERPL: 16 %
IRON SERPL-MCNC: 36 UG/DL
LYMPHOCYTES # BLD AUTO: 1.38 K/UL
LYMPHOCYTES NFR BLD AUTO: 18.9 %
MAN DIFF?: NORMAL
MCHC RBC-ENTMCNC: 28.6 PG
MCHC RBC-ENTMCNC: 30.8 GM/DL
MCV RBC AUTO: 92.9 FL
MONOCYTES # BLD AUTO: 0.83 K/UL
MONOCYTES NFR BLD AUTO: 11.4 %
NEUTROPHILS # BLD AUTO: 4.51 K/UL
NEUTROPHILS NFR BLD AUTO: 61.9 %
PLATELET # BLD AUTO: 267 K/UL
POTASSIUM SERPL-SCNC: 4.6 MMOL/L
RBC # BLD: 3.64 M/UL
RBC # FLD: 15.1 %
SODIUM SERPL-SCNC: 137 MMOL/L
TIBC SERPL-MCNC: 224 UG/DL
UIBC SERPL-MCNC: 188 UG/DL
WBC # FLD AUTO: 7.29 K/UL

## 2019-08-20 ENCOUNTER — APPOINTMENT (OUTPATIENT)
Dept: GASTROENTEROLOGY | Facility: CLINIC | Age: 84
End: 2019-08-20
Payer: MEDICARE

## 2019-08-20 VITALS
HEIGHT: 66 IN | WEIGHT: 142 LBS | RESPIRATION RATE: 16 BRPM | SYSTOLIC BLOOD PRESSURE: 132 MMHG | OXYGEN SATURATION: 96 % | DIASTOLIC BLOOD PRESSURE: 66 MMHG | HEART RATE: 68 BPM | BODY MASS INDEX: 22.82 KG/M2

## 2019-08-20 DIAGNOSIS — R15.9 FULL INCONTINENCE OF FECES: ICD-10-CM

## 2019-08-20 PROCEDURE — 99214 OFFICE O/P EST MOD 30 MIN: CPT

## 2019-08-20 NOTE — HISTORY OF PRESENT ILLNESS
[de-identified] : patient seen here 14 months ago with complaints of fecal incontinence. At that time it was determined the patient had a soft stool impaction was treated with mineral oil enemas and MiraLax and subsequently Citrucel and did fin. Few months ago he was hospitalized in [place] after having a femur fracture which was repaired surgically and then was in a nursing home for 80 days. Toward the end of that time he was rehospitalized and ammonia and was back to the nursing home and then discharged about 2 weeks ago. Patient now is having 3 is able to e

## 2019-08-20 NOTE — ASSESSMENT
[FreeTextEntry1] : I discussed with the patient and his son that he has the same issue as last year with soft stool impaction. I recommended that he use the same regimen of Mirlax and mineral oil enemas for 3 days and restart citrucel and discussed with Dr. Webb possibly stopping his iron. They will call me i 3 days and then we'll we will determine if any further treatment is indicated

## 2019-08-20 NOTE — PHYSICAL EXAM
[Sclera] : the sclera and conjunctiva were normal [General Appearance - In No Acute Distress] : in no acute distress [General Appearance - Alert] : alert [PERRL With Normal Accommodation] : pupils were equal in size, round, and reactive to light [Extraocular Movements] : extraocular movements were intact [Neck Appearance] : the appearance of the neck was normal [Neck Cervical Mass (___cm)] : no neck mass was observed [Jugular Venous Distention Increased] : there was no jugular-venous distention [Thyroid Nodule] : there were no palpable thyroid nodules [Thyroid Diffuse Enlargement] : the thyroid was not enlarged [Auscultation Breath Sounds / Voice Sounds] : lungs were clear to auscultation bilaterally [Heart Rate And Rhythm] : heart rate was normal and rhythm regular [Heart Sounds] : normal S1 and S2 [Heart Sounds Gallop] : no gallops [Heart Sounds Pericardial Friction Rub] : no pericardial rub [Murmurs] : no murmurs [Abdomen Tenderness] : non-tender [Abdomen Soft] : soft [Bowel Sounds] : normal bowel sounds [Abdomen Mass (___ Cm)] : no abdominal mass palpated [] : no hepato-splenomegaly [Normal Sphincter Tone] : normal sphincter tone [Impaired Insight] : insight and judgment were intact [Oriented To Time, Place, And Person] : oriented to person, place, and time [Affect] : the affect was normal [FreeTextEntry1] : soft stool impaction with grey stool

## 2019-08-21 ENCOUNTER — APPOINTMENT (OUTPATIENT)
Dept: INTERNAL MEDICINE | Facility: CLINIC | Age: 84
End: 2019-08-21

## 2019-09-05 ENCOUNTER — TRANSCRIPTION ENCOUNTER (OUTPATIENT)
Age: 84
End: 2019-09-05

## 2019-09-06 ENCOUNTER — OTHER (OUTPATIENT)
Age: 84
End: 2019-09-06

## 2019-09-25 ENCOUNTER — MED ADMIN CHARGE (OUTPATIENT)
Age: 84
End: 2019-09-25

## 2019-09-25 ENCOUNTER — APPOINTMENT (OUTPATIENT)
Dept: INTERNAL MEDICINE | Facility: CLINIC | Age: 84
End: 2019-09-25
Payer: MEDICARE

## 2019-09-25 VITALS
SYSTOLIC BLOOD PRESSURE: 120 MMHG | DIASTOLIC BLOOD PRESSURE: 52 MMHG | WEIGHT: 140 LBS | BODY MASS INDEX: 22.5 KG/M2 | HEIGHT: 66 IN

## 2019-09-25 PROCEDURE — G0008: CPT

## 2019-09-25 PROCEDURE — 99215 OFFICE O/P EST HI 40 MIN: CPT | Mod: 25

## 2019-09-25 PROCEDURE — 90662 IIV NO PRSV INCREASED AG IM: CPT

## 2019-09-25 RX ORDER — FOLIC ACID 1 MG/1
1 TABLET ORAL DAILY
Refills: 0 | Status: DISCONTINUED | COMMUNITY
Start: 2019-08-06 | End: 2019-09-25

## 2019-09-25 RX ORDER — FAMOTIDINE 20 MG/1
20 TABLET, FILM COATED ORAL
Qty: 180 | Refills: 3 | Status: DISCONTINUED | COMMUNITY
Start: 2019-08-06 | End: 2019-09-25

## 2019-09-25 RX ORDER — ASPIRIN 81 MG/1
81 TABLET ORAL DAILY
Qty: 30 | Refills: 2 | Status: DISCONTINUED | COMMUNITY
Start: 2019-08-06 | End: 2019-09-25

## 2019-09-25 RX ORDER — HYDRALAZINE HYDROCHLORIDE 100 MG/1
100 TABLET ORAL 3 TIMES DAILY
Refills: 0 | Status: DISCONTINUED | COMMUNITY
Start: 2019-08-06 | End: 2019-09-25

## 2019-09-25 NOTE — REVIEW OF SYSTEMS
[Dizziness] : dizziness [Memory Loss] : memory loss [Unsteady Walk] : ataxia [Negative] : Heme/Lymph

## 2019-09-25 NOTE — PHYSICAL EXAM
[Normal] : no posterior cervical lymphadenopathy and no anterior cervical lymphadenopathy [de-identified] : 26m [de-identified] : Patient with small noninfected ulcer of left heel with callus

## 2019-09-25 NOTE — ASSESSMENT
[FreeTextEntry1] : Patient doing significantly better since last visit. He is more alert but still quite confused. He has had no further episodes of urinary retention although he is on high-dose Flomax. This was discussed to great depth with the son and should the patient get lightheaded it will be reduced in dose.\par \par Patient examined and adjustments to therapy for HBP not required All labs reviewed with patient as well. Review of systems and physical exam discussed with patient. Care plan for future followups discussed with patient. All issues of health for the current year discussed in depth with patient who was conversant and all relevant issues addressed.\par \par Patient had adjustments of medications specifically aspirin was discontinued as it off is no help with atrial fibrillation and his risk for falls and bleeding\par \par Laboratory data is reviewed with the son and repeat labs will be drawn. in addition patient was immunized high-dose influenza\par \par All issues regarding patient's health and medical problems have been discussed. The patient understands and concurs with the treatment plan.

## 2019-09-25 NOTE — HISTORY OF PRESENT ILLNESS
[FreeTextEntry1] : check up on medical issues [de-identified] : Patient's hospital records were reviewed in its entirety and also discussed with the patient's son. The patient is now here after 2 recent hospitalizations and finally discharged home at the subacute care. He had multiple complications during the hospitalization as outlined below including pneumonia hematuria in rapid A. fib all of which has resolved.\par He is currently living at home with medicated nursing. Main issues that need to be addressed on his lethargy at home and concerns for new medications. Patient is essentially nonverbal and lethargic in the son is the historian with a questions\par \par Patient now here in followup doing much better and more alert. He had a fall and aspirin was discontinued by his cardiologist and with my recommendation from last visit.\par He has no complaints of chest pain, his Yu catheter is out and he remains on Flomax twice a day which is potentially of concern\par \par

## 2019-09-26 ENCOUNTER — TRANSCRIPTION ENCOUNTER (OUTPATIENT)
Age: 84
End: 2019-09-26

## 2019-09-26 LAB
ANION GAP SERPL CALC-SCNC: 15 MMOL/L
BASOPHILS # BLD AUTO: 0.06 K/UL
BASOPHILS NFR BLD AUTO: 0.9 %
BUN SERPL-MCNC: 25 MG/DL
CALCIUM SERPL-MCNC: 9.2 MG/DL
CHLORIDE SERPL-SCNC: 101 MMOL/L
CO2 SERPL-SCNC: 26 MMOL/L
CREAT SERPL-MCNC: 0.87 MG/DL
EOSINOPHIL # BLD AUTO: 0.27 K/UL
EOSINOPHIL NFR BLD AUTO: 4.1 %
GLUCOSE SERPL-MCNC: 172 MG/DL
HCT VFR BLD CALC: 39.1 %
HGB BLD-MCNC: 12 G/DL
IMM GRANULOCYTES NFR BLD AUTO: 0.3 %
LYMPHOCYTES # BLD AUTO: 1.56 K/UL
LYMPHOCYTES NFR BLD AUTO: 23.9 %
MAN DIFF?: NORMAL
MCHC RBC-ENTMCNC: 29.1 PG
MCHC RBC-ENTMCNC: 30.7 GM/DL
MCV RBC AUTO: 94.7 FL
MONOCYTES # BLD AUTO: 0.41 K/UL
MONOCYTES NFR BLD AUTO: 6.3 %
NEUTROPHILS # BLD AUTO: 4.22 K/UL
NEUTROPHILS NFR BLD AUTO: 64.5 %
PLATELET # BLD AUTO: 243 K/UL
POTASSIUM SERPL-SCNC: 5.2 MMOL/L
RBC # BLD: 4.13 M/UL
RBC # FLD: 15.3 %
SODIUM SERPL-SCNC: 142 MMOL/L
WBC # FLD AUTO: 6.54 K/UL

## 2019-09-29 PROCEDURE — 80048 BASIC METABOLIC PNL TOTAL CA: CPT

## 2019-09-29 PROCEDURE — 83735 ASSAY OF MAGNESIUM: CPT

## 2019-09-29 PROCEDURE — 87040 BLOOD CULTURE FOR BACTERIA: CPT

## 2019-09-29 PROCEDURE — 86901 BLOOD TYPING SEROLOGIC RH(D): CPT

## 2019-09-29 PROCEDURE — 86900 BLOOD TYPING SEROLOGIC ABO: CPT

## 2019-09-29 PROCEDURE — 87798 DETECT AGENT NOS DNA AMP: CPT

## 2019-09-29 PROCEDURE — 92526 ORAL FUNCTION THERAPY: CPT

## 2019-09-29 PROCEDURE — 87486 CHLMYD PNEUM DNA AMP PROBE: CPT

## 2019-09-29 PROCEDURE — 99291 CRITICAL CARE FIRST HOUR: CPT | Mod: 25

## 2019-09-29 PROCEDURE — 82803 BLOOD GASES ANY COMBINATION: CPT

## 2019-09-29 PROCEDURE — 93005 ELECTROCARDIOGRAM TRACING: CPT

## 2019-09-29 PROCEDURE — 87581 M.PNEUMON DNA AMP PROBE: CPT

## 2019-09-29 PROCEDURE — 82947 ASSAY GLUCOSE BLOOD QUANT: CPT

## 2019-09-29 PROCEDURE — 85014 HEMATOCRIT: CPT

## 2019-09-29 PROCEDURE — 71045 X-RAY EXAM CHEST 1 VIEW: CPT

## 2019-09-29 PROCEDURE — 87186 SC STD MICRODIL/AGAR DIL: CPT

## 2019-09-29 PROCEDURE — 36600 WITHDRAWAL OF ARTERIAL BLOOD: CPT

## 2019-09-29 PROCEDURE — 87633 RESP VIRUS 12-25 TARGETS: CPT

## 2019-09-29 PROCEDURE — 82435 ASSAY OF BLOOD CHLORIDE: CPT

## 2019-09-29 PROCEDURE — 82962 GLUCOSE BLOOD TEST: CPT

## 2019-09-29 PROCEDURE — 84295 ASSAY OF SERUM SODIUM: CPT

## 2019-09-29 PROCEDURE — 87150 DNA/RNA AMPLIFIED PROBE: CPT

## 2019-09-29 PROCEDURE — 80053 COMPREHEN METABOLIC PANEL: CPT

## 2019-09-29 PROCEDURE — 83605 ASSAY OF LACTIC ACID: CPT

## 2019-09-29 PROCEDURE — 36415 COLL VENOUS BLD VENIPUNCTURE: CPT

## 2019-09-29 PROCEDURE — 85610 PROTHROMBIN TIME: CPT

## 2019-09-29 PROCEDURE — 84100 ASSAY OF PHOSPHORUS: CPT

## 2019-09-29 PROCEDURE — 92610 EVALUATE SWALLOWING FUNCTION: CPT

## 2019-09-29 PROCEDURE — 97530 THERAPEUTIC ACTIVITIES: CPT

## 2019-09-29 PROCEDURE — 82330 ASSAY OF CALCIUM: CPT

## 2019-09-29 PROCEDURE — 82272 OCCULT BLD FECES 1-3 TESTS: CPT

## 2019-09-29 PROCEDURE — 84132 ASSAY OF SERUM POTASSIUM: CPT

## 2019-09-29 PROCEDURE — 96375 TX/PRO/DX INJ NEW DRUG ADDON: CPT

## 2019-09-29 PROCEDURE — 97116 GAIT TRAINING THERAPY: CPT

## 2019-09-29 PROCEDURE — 96365 THER/PROPH/DIAG IV INF INIT: CPT

## 2019-09-29 PROCEDURE — 85027 COMPLETE CBC AUTOMATED: CPT

## 2019-09-29 PROCEDURE — 97163 PT EVAL HIGH COMPLEX 45 MIN: CPT

## 2019-09-29 PROCEDURE — 86850 RBC ANTIBODY SCREEN: CPT

## 2019-09-29 PROCEDURE — 85730 THROMBOPLASTIN TIME PARTIAL: CPT

## 2019-10-31 ENCOUNTER — APPOINTMENT (OUTPATIENT)
Dept: INTERNAL MEDICINE | Facility: CLINIC | Age: 84
End: 2019-10-31
Payer: MEDICARE

## 2019-10-31 VITALS
DIASTOLIC BLOOD PRESSURE: 50 MMHG | BODY MASS INDEX: 22.82 KG/M2 | HEIGHT: 66 IN | WEIGHT: 142 LBS | SYSTOLIC BLOOD PRESSURE: 132 MMHG

## 2019-10-31 PROCEDURE — 99215 OFFICE O/P EST HI 40 MIN: CPT

## 2019-10-31 RX ORDER — TAMSULOSIN HYDROCHLORIDE 0.4 MG/1
0.4 CAPSULE ORAL
Qty: 90 | Refills: 1 | Status: ACTIVE | COMMUNITY
Start: 2019-08-06

## 2019-10-31 NOTE — ASSESSMENT
[FreeTextEntry1] : Patient with new infected decubitus ulcer of left heel. This is possibly complicated by peripheral vascular disease as I feel no peripheral pulses. This has the potential for threatening his leg which was stressed with the family\par There is no evidence of contiguous osteomyelitis sinus tract or fasciitis.\par This was discussed with the patient's son in great length and discussed with his podiatrist Dr. redmond. Plan is to maintain Augmentin since is no evidence of any significant infection and by appearance the seems to be improved.\par The concern is peripheral vascular disease limiting healing and progression of ulcer.\par Plan is to continue antibiotics followup with Dr. Davis for intensive physician directed wound care as well as referral to vascular surgery for a evaluation of peripheral circulation and recommendations.\par It is possible that the patient may require vascular no stenting to circulation is poor and the wound does not heal\par This is all discussed at great length and the patient will followup with podiatry and with me as needed

## 2019-10-31 NOTE — REVIEW OF SYSTEMS
[Dizziness] : dizziness [Unsteady Walk] : ataxia [Memory Loss] : memory loss [Negative] : Heme/Lymph [Fever] : no fever [Night Sweats] : no night sweats [FreeTextEntry9] : hpi [de-identified] : hpi

## 2019-10-31 NOTE — PHYSICAL EXAM
[Normal] : affect was normal and insight and judgment were intact [de-identified] : Patient with palpable left femoral and left popliteal pulse. No dorsalis pedis or anterior tibialis pulse was felt. Nailbed assessment was unable to be performed due to poor nails.\par The foot was warm [de-identified] : Patient had a 1 x 1 cm x 0.25 cm ulcer at the left posterior heel. It involves the fatty layer was unable to be probed to bone or deep sinus tissue. In addition there was no sinus tract. There is no cellulitis odor or purulent drainage

## 2019-10-31 NOTE — HISTORY OF PRESENT ILLNESS
[FreeTextEntry1] : Patient here in follow up to last visit and prior issue\par  [de-identified] : At last visit patient had a decubitus ulcer left heel with eschar as a complication of his prior hospitalization. Since then he developed breakdown with an infection treated by podiatry. A culture and labs were drawn and are pending. He was empirically placed on Augmentin. He comes in now for followup.\par The son denies fever or chills

## 2019-11-07 ENCOUNTER — APPOINTMENT (OUTPATIENT)
Dept: GASTROENTEROLOGY | Facility: CLINIC | Age: 84
End: 2019-11-07

## 2019-11-12 NOTE — CONSULT NOTE ADULT - SUBJECTIVE AND OBJECTIVE BOX
89yM was admitted on  after a witnessed slip and fall. He had not head injury or LOC. In ED, GCS=15 with complaints of left leg pain and was eternally rotated.         Imaging showed (ind reviewed):  HEAD CT - No acute intracranial hemorrhage or displaced skull fracture. Chronic microvascular ischemic changes with age-indeterminate infarcts as described. If clinically indicated, short-term follow-up or MRI may be obtained for further evaluation. Nonspecific bilateral mastoid opacification. Recommend clinical correlation to assess acute mastoiditis.    AP CT - 1. No evidence of visceral injury . 2. Acute left femoral subtrochanteric fracture. 3. Enlarged heterogeneous prostate gland with irregular nodular contour. measures 7.5 cm transverse. Potential mass.4. Moderate amount of stool throughout the colon. Large amount of stool in the rectum. Mild rectal wall thickening with adjacent stranding. Findings compatible with stercoral proctitis. Focal mural ischemia not excluded.    C SPINE CT - No acute findings    Left FEMORAL XR - Comminuted fracture of the left proximal femoral shaft.    Patient is s/p left IMN on  and is WBAT. Course complicated by anemia. Patient is a Jehova's witness.   This morning patient reports he has little pain in his left leg until he moves it. Did recall working with therapy and was lmited by his pain. He reports he was active at home and would benefit from rehab.     REVIEW OF SYSTEMS  Constitutional - No fever, No weight loss, +fatigue  HEENT - No eye pain, No visual disturbances, +difficulty hearing, No tinnitus, No vertigo, No neck pain  Respiratory - No cough, No wheezing, No shortness of breath  Cardiovascular - No chest pain, No palpitations  Gastrointestinal - No abdominal pain, No nausea, No vomiting, No diarrhea, No constipation  Genitourinary - No dysuria, No frequency, No hematuria, No incontinence  Neurological - No headaches, No memory loss, +loss of strength, No numbness, No tremors  Skin - No itching, No rashes, +lesions   Endocrine - No temperature intolerance  Musculoskeletal - +joint pain, +joint swelling, +muscle pain  Psychiatric - No depression, No anxiety    VITALS  T(C): 36.7 (19 @ 04:20), Max: 36.8 (19 @ 19:56)  HR: 83 (19 @ 04:20) (81 - 93)  BP: 114/55 (19 @ 04:20) (114/55 - 140/65)  RR: 18 (19 @ 19:56) (18 - 18)  SpO2: 95% (19 @ 19:56) (94% - 95%)  Wt(kg): --    PAST MEDICAL & SURGICAL HISTORY  Macular degeneration  UTI (urinary tract infection)  Narcolepsy  Aortic stenosis  GI bleed  HLD (hyperlipidemia)  Depression  Anemia  Endocarditis  BPH (benign prostatic hyperplasia)  Fatigue  S/P TAVR (transcatheter aortic valve replacement)  S/P cataract extraction and insertion of intraocular lens  H/O eye surgery      SOCIAL HISTORY  Smoking - Denied  EtOH - Denied   Drugs - Denied    FUNCTIONAL HISTORY  Lives with son, someone is home at all times - as per patient, 0 ANDREEA  Independent with [facility], Walks a block every day    CURRENT FUNCTIONAL STATUS    Bed Mobility: Rolling/Turning:     · Level of [place]	unable to perform	    Bed Mobility: Sit to Supine:     · Level of [place]	maximum assist (25% patients effort)	  · Physical Assist/Nonphysical Assist	1 person assist	  · Assistive Device	bed rails	    Bed Mobility: Supine to Sit:     · Level of [place]	maximum assist (25% patients effort)	  · Physical Assist/Nonphysical Assist	1 person assist	  · Assistive Device	bed rails	    Transfer: Bed to Chair:     Transfer Skill: Bed to Chair   · Level of [place]	unable to perform	    Transfer: Chair to Bed:     · Level of [place]	unable to perform	    Transfer: Sit to Stand:     · Level of [place]	maximum assist (25% patients effort)	  · Physical Assist/Nonphysical Assist	1 person assist	  · Weight-Bearing Restrictions	weight-bearing as tolerated	  · Assistive Device	rolling walker	    Transfer: Stand to Sit:     · Level of [place]	maximum assist (25% patients effort)	  · Physical Assist/Nonphysical Assist	1 person assist	  · Weight-Bearing Restrictions	weight-bearing as tolerated	  · Assistive Device	rolling walker	    Gait Skills:     · Level of [place]	moderate assist (50% patients effort)	  · Physical Assist/Nonphysical Assist	1 person assist	  · Weight-Bearing Restrictions	weight-bearing as tolerated	  · Assistive Device	rolling walker	  · Gait Distance	5 feet; Shuffling steps at bedside, pt unable to lift left LE	    Gait Analysis:     · Gait Pattern Used	3-point gait	  · Gait Deviations Noted	decreased step length	  · Impairments Contributing to Gait Deviations	impaired balance; impaired motor control; decreased ROM; decreased strength	    Stair Negotiation:     · Level of [place]	unable to perform; unsafe	      FAMILY HISTORY   No pertinent family history in first degree relatives      RECENT LABS/IMAGING  CBC Full  -  ( 2019 07:44 )  WBC Count : 7.3 K/uL  RBC Count : 2.42 M/uL  Hemoglobin : 7.0 g/dL  Hematocrit : 21.6 %  Platelet Count - Automated : 116 K/uL  Mean Cell Volume : 89.3 fl  Mean Cell Hemoglobin : 28.9 pg  Mean Cell Hemoglobin Concentration : 32.4 g/dL  Auto Neutrophil # : 5.4 K/uL  Auto Lymphocyte # : 1.0 K/uL  Auto Monocyte # : 0.8 K/uL  Auto Eosinophil # : 0.0 K/uL  Auto Basophil # : 0.0 K/uL  Auto Neutrophil % : 74.5 %  Auto Lymphocyte % : 14.1 %  Auto Monocyte % : 10.6 %  Auto Eosinophil % : 0.4 %  Auto Basophil % : 0.3 %        138  |  101  |  21.0<H>  ----------------------------<  143<H>  4.6   |  28.0  |  0.75    Ca    7.8<L>      2019 07:44  Phos  2.3       Mg     2.4           Urinalysis Basic - ( 2019 21:42 )    Color: Yellow / Appearance: Clear / S.020 / pH: x  Gluc: x / Ketone: Trace  / Bili: Negative / Urobili: Negative mg/dL   Blood: x / Protein: 30 mg/dL / Nitrite: Negative   Leuk Esterase: Negative / RBC: 0-2 /HPF / WBC 0-2   Sq Epi: x / Non Sq Epi: Occasional / Bacteria: Occasional        ALLERGIES  No Known Allergies      MEDICATIONS   acetaminophen   Tablet .. 650 milliGRAM(s) Oral every 6 hours PRN  aluminum hydroxide/magnesium hydroxide/simethicone Suspension 30 milliLiter(s) Oral four times a day PRN  bisacodyl Suppository 10 milliGRAM(s) Rectal daily PRN  ceFAZolin   IVPB 2000 milliGRAM(s) IV Intermittent once  diphenhydrAMINE 25 milliGRAM(s) Oral every 4 hours PRN  docusate sodium 100 milliGRAM(s) Oral three times a day  enoxaparin Injectable 30 milliGRAM(s) SubCutaneous every 12 hours  escitalopram 20 milliGRAM(s) Oral daily  famotidine    Tablet 20 milliGRAM(s) Oral every 12 hours  ferrous    sulfate 325 milliGRAM(s) Oral three times a day with meals  finasteride 5 milliGRAM(s) Oral daily  folic acid 1 milliGRAM(s) Oral daily  HYDROmorphone  Injectable 0.5 milliGRAM(s) IV Push every 3 hours PRN  magnesium citrate Oral Solution 1 Bottle Oral daily  magnesium hydroxide Suspension 30 milliLiter(s) Oral daily PRN  melatonin 5 milliGRAM(s) Oral at bedtime  memantine 10 milliGRAM(s) Oral two times a day  metoprolol tartrate 25 milliGRAM(s) Oral two times a day  modafinil 200 milliGRAM(s) Oral daily  ondansetron Injectable 4 milliGRAM(s) IV Push every 6 hours PRN  oxyCODONE    IR 10 milliGRAM(s) Oral every 4 hours PRN  oxyCODONE    IR 5 milliGRAM(s) Oral every 4 hours PRN  polyethylene glycol 3350 17 Gram(s) Oral two times a day  tamsulosin 0.4 milliGRAM(s) Oral at bedtime      ----------------------------------------------------------------------------------------  PHYSICAL EXAM  Constitutional - NAD, Comfortable - until left leg moves  HEENT - NCAT, EOMI  Neck - Supple, No limited ROM  Chest - Breathing comfortably, No wheezing  Cardiovascular - S1S2   Abdomen - Soft   Extremities - Mild dwelling of the right LE  Neurologic Exam -                    Cognitive - Awake, Alert, AAO to self, place, situation     Communication - Fluent, No dysarthria     Motor - Left shoulder weakness and BLE weakness                    LEFT    UE - ShAB 2/5, EF 4/5, EE 3/5,  5/5                    RIGHT UE - ShAB 4/5, EF 4/5, EE 4/5,  5/5                    LEFT    LE - HF 0/5, KE 1/5, DF 3/5                     RIGHT LE - HF 3/5, KE 3/5, DF 4/5      Sensory - Intact to LT  Psychiatric - fatigued  ----------------------------------------------------------------------------------------  ASSESSMENT/PLAN  89yMale with functional deficits after  a trauma related to a fall sustaining a left femoral fracture  Left femoral fracture s/p IMN - WBAT   Acute blood loss anemia - Worsening, Folate Iron, Monitoring, alternate means of supplementation given Jain beliefs  Pain - Tylenol, Dilaudid, Oxycodone, Consider Lidoderm patches  Memory - Namenda  Sleep Melatonin  Constipation - Dulcolax, MOM, Miralax, Colace  Mood - Lexapro  DVT PPX - SCDs, Lovenox  Rehab - Given's patient level of premobid functional activity and patient's significant risk of debility related to age, recommend ACUTE inpatient rehabilitation for the functional deficits consisting of 3 hours of therapy/day & 24 hour RN/daily PMR physician for comorbid medical management. Will continue to follow for ongoing rehab needs and recommendations. Patient will be able to tolerate 3 hours a day.    Continue bedside therapy as well as OOB throughout the day with mobilization throughout the day with staff to maintain cardiopulmonary function and prevention of secondary complications related to debility. Duration Of Freeze Thaw-Cycle (Seconds): 5 Total Number Of Aks Treated: 6 Post-Care Instructions: I reviewed with the patient in detail post-care instructions. Patient is to wear sunprotection, and avoid picking at any of the treated lesions. Pt may apply Vaseline to crusted or scabbing areas. Consent: The patient's consent was obtained including but not limited to risks of crusting, scabbing, blistering, scarring, darker or lighter pigmentary change, recurrence, incomplete removal and infection. Number Of Freeze-Thaw Cycles: 3 freeze-thaw cycles Detail Level: Zone Render In Bullet Format When Appropriate: No

## 2019-12-19 NOTE — ED PROVIDER NOTE - CPE EDP ENMT NORM
-- DO NOT REPLY / DO NOT REPLY ALL --  -- Message is from the [facility]--    General Patient Message      Reason for Call: Patient mom calling to get the EGD scheduled for the patient. Please have the surgery scheduler call mom to set up.    Caller Information       Type Contact Phone    12/19/2019 03:39 PM Phone (Incoming) ALFAROTATOLITZY (Mother) [phone] (H)          Alternative phone number:     Turnaround time given to caller:   \"This message will be sent to [state Provider's name]. The clinical team will fulfill your request as soon as they review your message.\"}    
normal...

## 2019-12-21 PROBLEM — S72.92XD FRACTURE OF LEFT FEMUR WITH ROUTINE HEALING: Status: ACTIVE | Noted: 2019-05-20

## 2019-12-23 ENCOUNTER — APPOINTMENT (OUTPATIENT)
Dept: INTERNAL MEDICINE | Facility: CLINIC | Age: 84
End: 2019-12-23
Payer: MEDICARE

## 2019-12-23 VITALS
WEIGHT: 142 LBS | DIASTOLIC BLOOD PRESSURE: 85 MMHG | RESPIRATION RATE: 16 BRPM | HEIGHT: 66 IN | SYSTOLIC BLOOD PRESSURE: 130 MMHG | HEART RATE: 61 BPM | BODY MASS INDEX: 22.82 KG/M2

## 2019-12-23 DIAGNOSIS — Z96.0 PRESENCE OF UROGENITAL IMPLANTS: ICD-10-CM

## 2019-12-23 DIAGNOSIS — S72.92XD UNSPECIFIED FRACTURE OF LEFT FEMUR, SUBSEQUENT ENCOUNTER FOR CLOSED FRACTURE WITH ROUTINE HEALING: ICD-10-CM

## 2019-12-23 DIAGNOSIS — L89.623 PRESSURE ULCER OF LEFT HEEL, STAGE 3: ICD-10-CM

## 2019-12-23 PROCEDURE — 36415 COLL VENOUS BLD VENIPUNCTURE: CPT

## 2019-12-23 PROCEDURE — 99214 OFFICE O/P EST MOD 30 MIN: CPT | Mod: 25

## 2019-12-23 RX ORDER — CHLORHEXIDINE GLUCONATE 4 %
325 (65 FE) LIQUID (ML) TOPICAL 3 TIMES DAILY
Refills: 0 | Status: COMPLETED | COMMUNITY
Start: 2019-08-06 | End: 2019-12-23

## 2019-12-23 RX ORDER — MULTIVIT-MIN/FOLIC/VIT K/LYCOP 400-300MCG
250 TABLET ORAL DAILY
Refills: 0 | Status: COMPLETED | COMMUNITY
Start: 2019-08-06 | End: 2019-12-23

## 2019-12-23 RX ORDER — DOCUSATE SODIUM 100 MG/1
100 CAPSULE, LIQUID FILLED ORAL TWICE DAILY
Refills: 0 | Status: COMPLETED | COMMUNITY
End: 2019-12-23

## 2019-12-23 RX ORDER — MULTIVIT-MIN/FOLIC/VIT K/LYCOP 400-300MCG
25 MCG TABLET ORAL DAILY
Qty: 30 | Refills: 3 | Status: COMPLETED | COMMUNITY
Start: 2019-08-06 | End: 2019-12-23

## 2019-12-23 RX ORDER — SACCHAROMYCES BOULARDII 50 MG
250 CAPSULE ORAL TWICE DAILY
Refills: 0 | Status: COMPLETED | COMMUNITY
End: 2019-12-23

## 2019-12-23 NOTE — PHYSICAL EXAM
[No Edema] : there was no peripheral edema [Normal] : affect was normal and insight and judgment were intact [de-identified] : Patient with palpable left femoral and left popliteal pulse. Heel is wrapped in dressing that cannot be removed

## 2019-12-23 NOTE — PLAN
[FreeTextEntry1] : Patient examined and adjustments to therapy for HBP not required All labs reviewed with patient as well. Review of systems and physical exam discussed with patient. Care plan for future followups discussed with patient. All issues of health for the current year discussed in depth with patient who was conversant and all relevant issues addressed.\par \par Patient with healing decubitus ulcer with normal circulation. He is busy by cardiology and is doing well. Patient is back to baseline. Followup labs drawn

## 2019-12-23 NOTE — HISTORY OF PRESENT ILLNESS
[FreeTextEntry1] : Patient here in follow up to last visit and prior issue\par f/up decubitus ulcer heel [de-identified] : At last visit patient had a decubitus ulcer left heel with eschar as a complication of his prior hospitalization. Since then he developed breakdown with an infection treated by podiatry. A culture and labs were drawn and are pending. He was empirically placed on Augmentin. He comes in now for followup.\par \par Since last visit patient is been under the care of vascular surgery Tino. Adequate circulation based on Doppler. He's been treated aggressively with local wound care and topical dressings with significant improvement without evidence of osteomyelitis or infection. He is here for a followup patient.\par Patient is completed iron therapy and his catheter has been out for several months. He is back to baseline

## 2019-12-24 ENCOUNTER — TRANSCRIPTION ENCOUNTER (OUTPATIENT)
Age: 84
End: 2019-12-24

## 2019-12-24 LAB
ANION GAP SERPL CALC-SCNC: 11 MMOL/L
BASOPHILS # BLD AUTO: 0.05 K/UL
BASOPHILS NFR BLD AUTO: 1.1 %
BUN SERPL-MCNC: 22 MG/DL
CALCIUM SERPL-MCNC: 9 MG/DL
CHLORIDE SERPL-SCNC: 102 MMOL/L
CO2 SERPL-SCNC: 29 MMOL/L
CREAT SERPL-MCNC: 0.84 MG/DL
EOSINOPHIL # BLD AUTO: 0.19 K/UL
EOSINOPHIL NFR BLD AUTO: 4.3 %
GLUCOSE SERPL-MCNC: 133 MG/DL
HCT VFR BLD CALC: 37.9 %
HGB BLD-MCNC: 11.6 G/DL
IMM GRANULOCYTES NFR BLD AUTO: 0.2 %
LYMPHOCYTES # BLD AUTO: 1.32 K/UL
LYMPHOCYTES NFR BLD AUTO: 30.1 %
MAN DIFF?: NORMAL
MCHC RBC-ENTMCNC: 29 PG
MCHC RBC-ENTMCNC: 30.6 GM/DL
MCV RBC AUTO: 94.8 FL
MONOCYTES # BLD AUTO: 0.38 K/UL
MONOCYTES NFR BLD AUTO: 8.7 %
NEUTROPHILS # BLD AUTO: 2.44 K/UL
NEUTROPHILS NFR BLD AUTO: 55.6 %
PLATELET # BLD AUTO: 195 K/UL
POTASSIUM SERPL-SCNC: 4.3 MMOL/L
RBC # BLD: 4 M/UL
RBC # FLD: 14.6 %
SODIUM SERPL-SCNC: 142 MMOL/L
WBC # FLD AUTO: 4.39 K/UL

## 2020-01-13 NOTE — PHYSICAL THERAPY INITIAL EVALUATION ADULT - REHAB POTENTIAL, PT EVAL
good, to achieve stated therapy goals

## 2020-04-20 ENCOUNTER — APPOINTMENT (OUTPATIENT)
Dept: INTERNAL MEDICINE | Facility: CLINIC | Age: 85
End: 2020-04-20

## 2020-06-26 NOTE — CHART NOTE - NSCHARTNOTEFT_GEN_A_CORE
Brief Post Op Check    Patient is now s/p intramedullary nail of L femur for a comminuted spiral proximal femur fracture. Progressing well. No complaints. Received TXA in OR. Patient is a Jehovah Witness.    No pain, no SOB, fevers, chills; feeling well; agrees to manual disimpaction for constipation    VSS & WNL    On PE L femur dressings c/d/i; pain able to wiggle his toes; slightly decreased strength on extension 2/2 pain    A/P:    Progressing well; no complaints; VSS & WNL  Will give another dose TXA per Ortho on the floor; Surgical resident to supervise infusion  Patient to work with PT, OT tomorrow to determine dispo  Manually disimpacted, followed by [place] enema; patient with large stool burden removed by hand with care
Patient unable to consent. Given the nature of his medical problems, pt needs a CT AP w/ IV contrast to r/o internal bleeding. This is a medical necessity
39

## 2020-07-27 NOTE — ED PROVIDER NOTE - CROS ED GU ALL NEG
POSTPARTUM ROUNDING NOTE - VAGINAL DELIVERY    Patient: Tootie Hendrickson Date: 2020   : 1987 Attending: Gertrude Amin MD   33 year old female Admit Date: 2020      Subjective     Postpartum Day 1 from a vaginal delivery.    The patient feels well.  Pain is well controlled with current medications.     The patient is ambulating well. The patient is tolerating a normal diet.     is passing flatus.  Patient has not had a bowel movement.    ROS  Denies  Nausea, vomiting, fever, chills, headache, changes in vision, chest pain or shortness of breath    Objective      Patient Vitals for the past 8 hrs:   BP Temp Temp src Pulse Resp   20 0200 114/73 97.7 °F (36.5 °C) Oral 62 16   20 2137 109/74 97.9 °F (36.6 °C) Oral 64 16     General:    alert, cooperative and no distress   Lochia:  appropriate   Uterine Fundus:   firm; below umbilicus; appropriately tender to palpation.   DVT Evaluation:  No evidence of DVT seen on physical exam.     Assessment     Tootie Hendrickson is a 33 year old  on post partum day 1 status post spontaneous vaginal delivery. Doing well postpartum.    Plan   Continue routine postpartum management    -Diet: Tolerating general diet  -Heme: Postpartum labs pending   HGB (g/dL)   Date Value   2020 10.7 (L)   2020 11.2 (L)       -ID:  VSS, afebrile.  -Rh: A POSITIVE   -Rubella: Immune  -DVT Prophylaxis: Encourage ambulation  -Final dispo per Primary attending    Attending: JAMIN Hernandez DO - PGY-2  20 5:03 AM   - - -

## 2020-10-12 ENCOUNTER — APPOINTMENT (OUTPATIENT)
Dept: INTERNAL MEDICINE | Facility: CLINIC | Age: 85
End: 2020-10-12
Payer: MEDICARE

## 2020-10-12 ENCOUNTER — MED ADMIN CHARGE (OUTPATIENT)
Age: 85
End: 2020-10-12

## 2020-10-12 VITALS
BODY MASS INDEX: 22.5 KG/M2 | HEIGHT: 66 IN | DIASTOLIC BLOOD PRESSURE: 60 MMHG | WEIGHT: 140 LBS | TEMPERATURE: 97 F | SYSTOLIC BLOOD PRESSURE: 120 MMHG

## 2020-10-12 DIAGNOSIS — I35.0 NONRHEUMATIC AORTIC (VALVE) STENOSIS: ICD-10-CM

## 2020-10-12 DIAGNOSIS — N13.8 BENIGN PROSTATIC HYPERPLASIA WITH LOWER URINARY TRACT SYMPMS: ICD-10-CM

## 2020-10-12 DIAGNOSIS — N40.1 BENIGN PROSTATIC HYPERPLASIA WITH LOWER URINARY TRACT SYMPMS: ICD-10-CM

## 2020-10-12 PROCEDURE — 99214 OFFICE O/P EST MOD 30 MIN: CPT | Mod: 25

## 2020-10-12 PROCEDURE — G0008: CPT

## 2020-10-12 PROCEDURE — 90662 IIV NO PRSV INCREASED AG IM: CPT

## 2020-10-12 PROCEDURE — 36415 COLL VENOUS BLD VENIPUNCTURE: CPT

## 2020-10-12 NOTE — HISTORY OF PRESENT ILLNESS
[FreeTextEntry1] : Patient here in follow up to last visit and prior issue\par f/up decubitus ulcer heel\par routine f/up and heel improved [de-identified] : At last visit patient had a decubitus ulcer left heel with eschar as a complication of his prior hospitalization. Since then he developed breakdown with an infection treated by podiatry. A culture and labs were drawn and are pending. He was empirically placed on Augmentin. He comes in now for followup.\par \par Since last visit patient is been under the care of vascular surgery Tino. Adequate circulation based on Doppler. He's been treated aggressively with local wound care and topical dressings with significant improvement without evidence of osteomyelitis or infection. He is here for a followup patient.\par Patient is completed iron therapy and his catheter has been out for several months. He is back to baseline\par \par \par [id number]\par Patient in for routine followup on medical problems. His ulcer on his heel resolves over the past 6 months. He has not been seen since December due to the epidemic. He is up-to-date with cardiology and had a good evaluation\par Patient's aortic valve is working well. He is no longer in nature fibrillation or on anticoagulants. His Yu catheter is out. His dementia is stable. There is mild edema. He has been physically active at home

## 2020-10-12 NOTE — PHYSICAL EXAM
[No Acute Distress] : no acute distress [Well Nourished] : well nourished [Well Developed] : well developed [Well-Appearing] : well-appearing [Normal Sclera/Conjunctiva] : normal sclera/conjunctiva [PERRL] : pupils equal round and reactive to light [EOMI] : extraocular movements intact [Normal Outer Ear/Nose] : the outer ears and nose were normal in appearance [Normal Oropharynx] : the oropharynx was normal [No JVD] : no jugular venous distention [No Lymphadenopathy] : no lymphadenopathy [Supple] : supple [Thyroid Normal, No Nodules] : the thyroid was normal and there were no nodules present [No Respiratory Distress] : no respiratory distress  [No Accessory Muscle Use] : no accessory muscle use [Clear to Auscultation] : lungs were clear to auscultation bilaterally [Normal Rate] : normal rate  [Regular Rhythm] : with a regular rhythm [Normal S1, S2] : normal S1 and S2 [No Murmur] : no murmur heard [No Carotid Bruits] : no carotid bruits [No Abdominal Bruit] : a ~M bruit was not heard ~T in the abdomen [Pedal Pulses Present] : the pedal pulses are present [No Edema] : there was no peripheral edema [No Palpable Aorta] : no palpable aorta [No Extremity Clubbing/Cyanosis] : no extremity clubbing/cyanosis [Soft] : abdomen soft [Non Tender] : non-tender [Non-distended] : non-distended [No Masses] : no abdominal mass palpated [No HSM] : no HSM [Normal Bowel Sounds] : normal bowel sounds [Normal Posterior Cervical Nodes] : no posterior cervical lymphadenopathy [Normal Anterior Cervical Nodes] : no anterior cervical lymphadenopathy [No CVA Tenderness] : no CVA  tenderness [No Spinal Tenderness] : no spinal tenderness [No Joint Swelling] : no joint swelling [Grossly Normal Strength/Tone] : grossly normal strength/tone [No Rash] : no rash [Coordination Grossly Intact] : coordination grossly intact [No Focal Deficits] : no focal deficits [Normal Gait] : normal gait [Normal Affect] : the affect was normal [Normal Insight/Judgement] : insight and judgment were intact [de-identified] : 1+ edema

## 2020-10-12 NOTE — ASSESSMENT
[FreeTextEntry1] : Patient examined and adjustments to therapy for HBP DOES not need to be just All labs reviewed with patient as well. Review of systems and physical exam discussed with patient. Care plan for future followups discussed with patient. All issues of health for the current year discussed in depth with patient who was conversant and all relevant issues addressed.\par \par Patient was trivial left lower extremity edema treated with compression stockings as ejection fraction normal and negative cardiology evaluation. Patient's physical exam is unremarkable with normal vital signs and normal sinus rhythm. Lab results reviewed and discussed with patient's son. Cardiology consultation reviewed and discussed with patient's son. Patient baseline dementia but is extremely stable. At today's visit he had routine labs done and high-dose influenza vaccination given\par \par All issues regarding patient's health and medical problems have been discussed. The patient understands and concurs with the treatment plan.

## 2020-10-13 ENCOUNTER — TRANSCRIPTION ENCOUNTER (OUTPATIENT)
Age: 85
End: 2020-10-13

## 2020-10-13 LAB
ALBUMIN SERPL ELPH-MCNC: 4.3 G/DL
ALP BLD-CCNC: 66 U/L
ALT SERPL-CCNC: 14 U/L
ANION GAP SERPL CALC-SCNC: 13 MMOL/L
AST SERPL-CCNC: 19 U/L
BASOPHILS # BLD AUTO: 0.06 K/UL
BASOPHILS NFR BLD AUTO: 1.5 %
BILIRUB SERPL-MCNC: 0.3 MG/DL
BUN SERPL-MCNC: 21 MG/DL
CALCIUM SERPL-MCNC: 8.8 MG/DL
CHLORIDE SERPL-SCNC: 105 MMOL/L
CO2 SERPL-SCNC: 26 MMOL/L
CREAT SERPL-MCNC: 0.94 MG/DL
EOSINOPHIL # BLD AUTO: 0.26 K/UL
EOSINOPHIL NFR BLD AUTO: 6.3 %
GLUCOSE SERPL-MCNC: 166 MG/DL
HCT VFR BLD CALC: 38.1 %
HGB BLD-MCNC: 11.6 G/DL
IMM GRANULOCYTES NFR BLD AUTO: 0.2 %
LYMPHOCYTES # BLD AUTO: 1.4 K/UL
LYMPHOCYTES NFR BLD AUTO: 34.1 %
MAN DIFF?: NORMAL
MCHC RBC-ENTMCNC: 28.5 PG
MCHC RBC-ENTMCNC: 30.4 GM/DL
MCV RBC AUTO: 93.6 FL
MONOCYTES # BLD AUTO: 0.33 K/UL
MONOCYTES NFR BLD AUTO: 8 %
NEUTROPHILS # BLD AUTO: 2.04 K/UL
NEUTROPHILS NFR BLD AUTO: 49.9 %
PLATELET # BLD AUTO: 207 K/UL
POTASSIUM SERPL-SCNC: 4.4 MMOL/L
PROT SERPL-MCNC: 6.4 G/DL
RBC # BLD: 4.07 M/UL
RBC # FLD: 15.1 %
SODIUM SERPL-SCNC: 144 MMOL/L
WBC # FLD AUTO: 4.1 K/UL

## 2021-01-01 NOTE — PATIENT PROFILE ADULT - FLU SEASON?
viable  boy at 36. Puryear cried and suctioned at the perineum. Placed on mother abdomen for delayed cord clamping. Placed skin to skin with mother. apgars 9/9. Mother and  stable at this time.  AGA
Baby Name: Louana Duane  : 2021    Mom Name: Boom Coronel    P[facility]ian: [facility]        Hearing Risk  Risk Factors for Hearing Loss: No known risk factors    Hearing Screening 1     Screener Name: andrew  Method: Otoacoustic emissions  Screening 1 Results: Right Ear Pass, Left Ear Pass
[facility] Bands checked with L and D nurse, 2 vessel cord clamped and shortened. Assessment as charted.
No

## 2021-02-18 NOTE — PROGRESS NOTE ADULT - PROVIDER SPECIALTY LIST ADULT
Hospitalist
Urology
Hospitalist
yes
Hospitalist
Hospitalist

## 2021-02-21 ENCOUNTER — TRANSCRIPTION ENCOUNTER (OUTPATIENT)
Age: 86
End: 2021-02-21

## 2021-03-02 ENCOUNTER — RX RENEWAL (OUTPATIENT)
Age: 86
End: 2021-03-02

## 2021-03-22 NOTE — ED PROVIDER NOTE - ENMT, MLM
Airway patent, Nasal mucosa clear. Mouth with normal mucosa. Throat has no vesicles, no oropharyngeal exudates and uvula is midline. never used

## 2021-05-26 PROBLEM — I70.244 ATHEROSCLEROSIS OF NATIVE ARTERY OF LEFT LOWER EXTREMITY WITH ULCERATION OF HEEL: Status: ACTIVE | Noted: 2019-10-31

## 2021-05-27 ENCOUNTER — APPOINTMENT (OUTPATIENT)
Dept: INTERNAL MEDICINE | Facility: CLINIC | Age: 86
End: 2021-05-27
Payer: MEDICARE

## 2021-05-27 VITALS
BODY MASS INDEX: 25.39 KG/M2 | WEIGHT: 158 LBS | SYSTOLIC BLOOD PRESSURE: 125 MMHG | DIASTOLIC BLOOD PRESSURE: 50 MMHG | HEIGHT: 66 IN | TEMPERATURE: 97.4 F

## 2021-05-27 VITALS — HEART RATE: 60 BPM

## 2021-05-27 DIAGNOSIS — I70.244 ATHEROSCLEROSIS OF NATIVE ARTERIES OF LEFT LEG WITH ULCERATION OF HEEL AND MIDFOOT: ICD-10-CM

## 2021-05-27 PROCEDURE — 99214 OFFICE O/P EST MOD 30 MIN: CPT | Mod: 25

## 2021-05-27 PROCEDURE — 36415 COLL VENOUS BLD VENIPUNCTURE: CPT

## 2021-05-27 RX ORDER — METOPROLOL TARTRATE 25 MG/1
25 TABLET, FILM COATED ORAL TWICE DAILY
Qty: 180 | Refills: 0 | Status: DISCONTINUED | COMMUNITY
Start: 2021-03-02 | End: 2021-05-27

## 2021-05-27 NOTE — PHYSICAL EXAM
[Well Nourished] : well nourished [Well Developed] : well developed [Well-Appearing] : well-appearing [Normal Sclera/Conjunctiva] : normal sclera/conjunctiva [PERRL] : pupils equal round and reactive to light [EOMI] : extraocular movements intact [Normal Outer Ear/Nose] : the outer ears and nose were normal in appearance [Normal Oropharynx] : the oropharynx was normal [No JVD] : no jugular venous distention [No Lymphadenopathy] : no lymphadenopathy [Supple] : supple [Thyroid Normal, No Nodules] : the thyroid was normal and there were no nodules present [No Respiratory Distress] : no respiratory distress  [No Accessory Muscle Use] : no accessory muscle use [Clear to Auscultation] : lungs were clear to auscultation bilaterally [Normal Rate] : normal rate  [Regular Rhythm] : with a regular rhythm [Normal S1, S2] : normal S1 and S2 [No Murmur] : no murmur heard [No Carotid Bruits] : no carotid bruits [No Abdominal Bruit] : a ~M bruit was not heard ~T in the abdomen [Pedal Pulses Present] : the pedal pulses are present [No Edema] : there was no peripheral edema [No Palpable Aorta] : no palpable aorta [No Extremity Clubbing/Cyanosis] : no extremity clubbing/cyanosis [Soft] : abdomen soft [Non Tender] : non-tender [Non-distended] : non-distended [No Masses] : no abdominal mass palpated [No HSM] : no HSM [Normal Bowel Sounds] : normal bowel sounds [Normal Posterior Cervical Nodes] : no posterior cervical lymphadenopathy [Normal Anterior Cervical Nodes] : no anterior cervical lymphadenopathy [No CVA Tenderness] : no CVA  tenderness [No Spinal Tenderness] : no spinal tenderness [No Joint Swelling] : no joint swelling [Grossly Normal Strength/Tone] : grossly normal strength/tone [No Rash] : no rash [Coordination Grossly Intact] : coordination grossly intact [No Focal Deficits] : no focal deficits [Normal Gait] : normal gait [Normal Affect] : the affect was normal [Normal Insight/Judgement] : insight and judgment were intact [de-identified] : 1+ edema

## 2021-05-27 NOTE — ASSESSMENT
[FreeTextEntry1] : Patient examined and adjustments to therapy for HBP not required now with heart rate 60s and blood pressure 120/70 but will be seeing cardiologist.  It is unclear why he is on metoprolol and all the son can remember he was given after his TAVR all labs reviewed with patient as well. Review of systems and physical exam discussed with patient. Care plan for future followups discussed with patient. All issues of health for the current year discussed in depth with patient who was conversant and all relevant issues addressed.\par \par Patient with no evidence of leg ulcers or other neurological problems.  No changes in medication\par All issues regarding patient's health and medical problems have been discussed. The patient understands and concurs with the treatment plan.\par This was an extended visit lasting 35 minutes, including review of prior notes laboratory data and examination and discussing with patient including completion of current note.\par \par

## 2021-05-27 NOTE — HISTORY OF PRESENT ILLNESS
[FreeTextEntry1] : Patient here in follow up to last visit and prior issue\par f/up decubitus ulcer heel\par routine f/up and heel improved [de-identified] : Patient here for routine follow-up last seen October 2020.  At his last cardiology appointment he was bradycardic and his metoprolol was reduced to 12.5 mg a day.  He is otherwise stable eating well ambulating well and no significant progression in his underlying dementia.\par He is status post TAVR without complaints of shortness of breath and he is currently fully vaccinated

## 2021-05-31 ENCOUNTER — TRANSCRIPTION ENCOUNTER (OUTPATIENT)
Age: 86
End: 2021-05-31

## 2021-05-31 LAB
ALBUMIN SERPL ELPH-MCNC: 4.2 G/DL
ALP BLD-CCNC: 60 U/L
ALT SERPL-CCNC: 13 U/L
ANION GAP SERPL CALC-SCNC: 11 MMOL/L
AST SERPL-CCNC: 21 U/L
BASOPHILS # BLD AUTO: 0.05 K/UL
BASOPHILS NFR BLD AUTO: 1 %
BILIRUB SERPL-MCNC: 0.2 MG/DL
BUN SERPL-MCNC: 25 MG/DL
CALCIUM SERPL-MCNC: 9 MG/DL
CHLORIDE SERPL-SCNC: 104 MMOL/L
CO2 SERPL-SCNC: 27 MMOL/L
CREAT SERPL-MCNC: 1.01 MG/DL
EOSINOPHIL # BLD AUTO: 0.27 K/UL
EOSINOPHIL NFR BLD AUTO: 5.6 %
GLUCOSE SERPL-MCNC: 117 MG/DL
HCT VFR BLD CALC: 36.2 %
HGB BLD-MCNC: 11.5 G/DL
IMM GRANULOCYTES NFR BLD AUTO: 0.4 %
LYMPHOCYTES # BLD AUTO: 1.49 K/UL
LYMPHOCYTES NFR BLD AUTO: 30.7 %
MAN DIFF?: NORMAL
MCHC RBC-ENTMCNC: 29.2 PG
MCHC RBC-ENTMCNC: 31.8 GM/DL
MCV RBC AUTO: 91.9 FL
MONOCYTES # BLD AUTO: 0.46 K/UL
MONOCYTES NFR BLD AUTO: 9.5 %
NEUTROPHILS # BLD AUTO: 2.56 K/UL
NEUTROPHILS NFR BLD AUTO: 52.8 %
PLATELET # BLD AUTO: 175 K/UL
POTASSIUM SERPL-SCNC: 4.6 MMOL/L
PROT SERPL-MCNC: 6.1 G/DL
RBC # BLD: 3.94 M/UL
RBC # FLD: 14.9 %
SODIUM SERPL-SCNC: 142 MMOL/L
WBC # FLD AUTO: 4.85 K/UL

## 2021-06-05 NOTE — H&P ADULT - CARDIOVASCULAR
Patient: Josefina DOVER Tatem  HERNIORRHAPHY, UMBILICAL, LAPAROSCOPIC with mesh  Anesthesia type: general    Patient location: PACU  Last vitals:   Vitals Value Taken Time   /63 1/16/2020  2:30 PM   Temp 36.5  C (97.7  F) 1/16/2020  1:33 PM   Pulse 88 1/16/2020  2:36 PM   Resp 16 1/16/2020  2:30 PM   SpO2 93 % 1/16/2020  2:36 PM   Vitals shown include unvalidated device data.  Post vital signs: stable  Level of consciousness: awake and responds to simple questions  Post-anesthesia pain: pain controlled  Post-anesthesia nausea and vomiting: no  Pulmonary: unassisted, return to baseline  Cardiovascular: stable and blood pressure at baseline  Hydration: adequate  Anesthetic events: no    QCDR Measures:  ASA# 11 - [place]-op Cardiac Arrest: ASA11B - Patient did NOT experience unanticipated cardiac arrest  ASA# 12 - [place]-op Mortality Rate: ASA12B - Patient did NOT die  ASA# 13 - PACU Re-Intubation Rate: ASA13B - Patient did NOT require a new airway mgmt  ASA# 10 - Composite Anes Safety: ASA10A - No serious adverse event    Additional Notes:   Regular rate & rhythm, normal S1, S2; no murmurs, gallops or rubs; no S3, S4 negative

## 2021-09-02 ENCOUNTER — APPOINTMENT (OUTPATIENT)
Dept: INTERNAL MEDICINE | Facility: CLINIC | Age: 86
End: 2021-09-02

## 2021-09-14 ENCOUNTER — APPOINTMENT (OUTPATIENT)
Dept: INTERNAL MEDICINE | Facility: CLINIC | Age: 86
End: 2021-09-14
Payer: MEDICARE

## 2021-09-14 VITALS
BODY MASS INDEX: 25.39 KG/M2 | SYSTOLIC BLOOD PRESSURE: 120 MMHG | DIASTOLIC BLOOD PRESSURE: 70 MMHG | WEIGHT: 158 LBS | HEIGHT: 66 IN

## 2021-09-14 VITALS
SYSTOLIC BLOOD PRESSURE: 120 MMHG | BODY MASS INDEX: 25.39 KG/M2 | DIASTOLIC BLOOD PRESSURE: 70 MMHG | HEIGHT: 66 IN | TEMPERATURE: 97 F | WEIGHT: 158 LBS

## 2021-09-14 DIAGNOSIS — Z01.818 ENCOUNTER FOR OTHER PREPROCEDURAL EXAMINATION: ICD-10-CM

## 2021-09-14 PROCEDURE — G0008: CPT

## 2021-09-14 PROCEDURE — 99213 OFFICE O/P EST LOW 20 MIN: CPT | Mod: 25

## 2021-09-14 PROCEDURE — 90662 IIV NO PRSV INCREASED AG IM: CPT

## 2021-09-14 NOTE — PHYSICAL EXAM
[Well Nourished] : well nourished [Well Developed] : well developed [Well-Appearing] : well-appearing [Normal Sclera/Conjunctiva] : normal sclera/conjunctiva [PERRL] : pupils equal round and reactive to light [EOMI] : extraocular movements intact [Normal Outer Ear/Nose] : the outer ears and nose were normal in appearance [Normal Oropharynx] : the oropharynx was normal [No JVD] : no jugular venous distention [No Lymphadenopathy] : no lymphadenopathy [Supple] : supple [Thyroid Normal, No Nodules] : the thyroid was normal and there were no nodules present [No Respiratory Distress] : no respiratory distress  [No Accessory Muscle Use] : no accessory muscle use [Clear to Auscultation] : lungs were clear to auscultation bilaterally [Normal Rate] : normal rate  [Regular Rhythm] : with a regular rhythm [Normal S1, S2] : normal S1 and S2 [No Carotid Bruits] : no carotid bruits [No Abdominal Bruit] : a ~M bruit was not heard ~T in the abdomen [Pedal Pulses Present] : the pedal pulses are present [No Edema] : there was no peripheral edema [No Palpable Aorta] : no palpable aorta [No Extremity Clubbing/Cyanosis] : no extremity clubbing/cyanosis [Soft] : abdomen soft [Non Tender] : non-tender [Non-distended] : non-distended [No Masses] : no abdominal mass palpated [No HSM] : no HSM [Normal Bowel Sounds] : normal bowel sounds [Normal Posterior Cervical Nodes] : no posterior cervical lymphadenopathy [Normal Anterior Cervical Nodes] : no anterior cervical lymphadenopathy [No CVA Tenderness] : no CVA  tenderness [No Spinal Tenderness] : no spinal tenderness [No Joint Swelling] : no joint swelling [Grossly Normal Strength/Tone] : grossly normal strength/tone [No Rash] : no rash [Coordination Grossly Intact] : coordination grossly intact [No Focal Deficits] : no focal deficits [Normal Gait] : normal gait [Normal Affect] : the affect was normal [Normal Insight/Judgement] : insight and judgment were intact [de-identified] : 2/6 [de-identified] : 1+ edema

## 2021-09-14 NOTE — HISTORY OF PRESENT ILLNESS
[Aortic Stenosis] : aortic stenosis [No Pertinent Pulmonary History] : no history of asthma, COPD, sleep apnea, or smoking [No Adverse Anesthesia Reaction] : no adverse anesthesia reaction in self or family member [Atrial Fibrillation] : no atrial fibrillation [Coronary Artery Disease] : no coronary artery disease [Recent Myocardial Infarction] : no recent myocardial infarction [Implantable Device/Pacemaker] : no implantable device/pacemaker [(Patient denies any chest pain, claudication, dyspnea on exertion, orthopnea, palpitations or syncope)] : Patient denies any chest pain, claudication, dyspnea on exertion, orthopnea, palpitations or syncope [FreeTextEntry1] : dental cleaning [FreeTextEntry4] : Dental cleaning\par h/o tavr

## 2021-09-14 NOTE — PLAN
[FreeTextEntry1] : Patient has a extremely low risk procedure which consists of only her dental cleaning without extraction.  Note given to dentist that epinephrine cannot be used and he actually requires 2 g amoxicillin 1 hour before any dental procedure.  Note given to patient and faxed to dental office patient also received influenza vaccination

## 2021-09-26 ENCOUNTER — INPATIENT (INPATIENT)
Facility: HOSPITAL | Age: 86
LOS: 4 days | Discharge: HOME | DRG: 872 | End: 2021-10-01
Attending: STUDENT IN AN ORGANIZED HEALTH CARE EDUCATION/TRAINING PROGRAM | Admitting: INTERNAL MEDICINE
Payer: MEDICARE

## 2021-09-26 VITALS
SYSTOLIC BLOOD PRESSURE: 182 MMHG | OXYGEN SATURATION: 97 % | HEIGHT: 66 IN | TEMPERATURE: 100 F | DIASTOLIC BLOOD PRESSURE: 88 MMHG | RESPIRATION RATE: 20 BRPM | WEIGHT: 160.06 LBS | HEART RATE: 103 BPM

## 2021-09-26 DIAGNOSIS — Z95.2 PRESENCE OF PROSTHETIC HEART VALVE: Chronic | ICD-10-CM

## 2021-09-26 DIAGNOSIS — Z98.49 CATARACT EXTRACTION STATUS, UNSPECIFIED EYE: Chronic | ICD-10-CM

## 2021-09-26 LAB
ALBUMIN SERPL ELPH-MCNC: 4.3 G/DL — SIGNIFICANT CHANGE UP (ref 3.3–5.2)
ALP SERPL-CCNC: 65 U/L — SIGNIFICANT CHANGE UP (ref 40–120)
ALT FLD-CCNC: 14 U/L — SIGNIFICANT CHANGE UP
ANION GAP SERPL CALC-SCNC: 15 MMOL/L — SIGNIFICANT CHANGE UP (ref 5–17)
APPEARANCE UR: CLEAR — SIGNIFICANT CHANGE UP
APTT BLD: 33.7 SEC — SIGNIFICANT CHANGE UP (ref 27.5–35.5)
AST SERPL-CCNC: 20 U/L — SIGNIFICANT CHANGE UP
BACTERIA # UR AUTO: ABNORMAL
BASOPHILS # BLD AUTO: 0 K/UL — SIGNIFICANT CHANGE UP (ref 0–0.2)
BASOPHILS NFR BLD AUTO: 0 % — SIGNIFICANT CHANGE UP (ref 0–2)
BILIRUB SERPL-MCNC: 0.4 MG/DL — SIGNIFICANT CHANGE UP (ref 0.4–2)
BILIRUB UR-MCNC: NEGATIVE — SIGNIFICANT CHANGE UP
BUN SERPL-MCNC: 25.4 MG/DL — HIGH (ref 8–20)
CALCIUM SERPL-MCNC: 8.8 MG/DL — SIGNIFICANT CHANGE UP (ref 8.6–10.2)
CHLORIDE SERPL-SCNC: 103 MMOL/L — SIGNIFICANT CHANGE UP (ref 98–107)
CO2 SERPL-SCNC: 25 MMOL/L — SIGNIFICANT CHANGE UP (ref 22–29)
COLOR SPEC: YELLOW — SIGNIFICANT CHANGE UP
CREAT SERPL-MCNC: 0.96 MG/DL — SIGNIFICANT CHANGE UP (ref 0.5–1.3)
DIFF PNL FLD: ABNORMAL
EOSINOPHIL # BLD AUTO: 0 K/UL — SIGNIFICANT CHANGE UP (ref 0–0.5)
EOSINOPHIL NFR BLD AUTO: 0 % — SIGNIFICANT CHANGE UP (ref 0–6)
EPI CELLS # UR: SIGNIFICANT CHANGE UP
GLUCOSE SERPL-MCNC: 198 MG/DL — HIGH (ref 70–99)
GLUCOSE UR QL: 100 MG/DL
HCT VFR BLD CALC: 38.3 % — LOW (ref 39–50)
HGB BLD-MCNC: 12.5 G/DL — LOW (ref 13–17)
INR BLD: 1.15 RATIO — SIGNIFICANT CHANGE UP (ref 0.88–1.16)
KETONES UR-MCNC: ABNORMAL
LACTATE BLDV-MCNC: 2.8 MMOL/L — HIGH (ref 0.5–2)
LEUKOCYTE ESTERASE UR-ACNC: ABNORMAL
LYMPHOCYTES # BLD AUTO: 0.77 K/UL — LOW (ref 1–3.3)
LYMPHOCYTES # BLD AUTO: 7 % — LOW (ref 13–44)
MANUAL SMEAR VERIFICATION: SIGNIFICANT CHANGE UP
MCHC RBC-ENTMCNC: 29.8 PG — SIGNIFICANT CHANGE UP (ref 27–34)
MCHC RBC-ENTMCNC: 32.6 GM/DL — SIGNIFICANT CHANGE UP (ref 32–36)
MCV RBC AUTO: 91.4 FL — SIGNIFICANT CHANGE UP (ref 80–100)
MONOCYTES # BLD AUTO: 0.57 K/UL — SIGNIFICANT CHANGE UP (ref 0–0.9)
MONOCYTES NFR BLD AUTO: 5.2 % — SIGNIFICANT CHANGE UP (ref 2–14)
NEUTROPHILS # BLD AUTO: 9.68 K/UL — HIGH (ref 1.8–7.4)
NEUTROPHILS NFR BLD AUTO: 87.8 % — HIGH (ref 43–77)
NITRITE UR-MCNC: NEGATIVE — SIGNIFICANT CHANGE UP
PH UR: 6 — SIGNIFICANT CHANGE UP (ref 5–8)
PLAT MORPH BLD: NORMAL — SIGNIFICANT CHANGE UP
PLATELET # BLD AUTO: 158 K/UL — SIGNIFICANT CHANGE UP (ref 150–400)
POTASSIUM SERPL-MCNC: 4.4 MMOL/L — SIGNIFICANT CHANGE UP (ref 3.5–5.3)
POTASSIUM SERPL-SCNC: 4.4 MMOL/L — SIGNIFICANT CHANGE UP (ref 3.5–5.3)
PROT SERPL-MCNC: 7 G/DL — SIGNIFICANT CHANGE UP (ref 6.6–8.7)
PROT UR-MCNC: 30 MG/DL
PROTHROM AB SERPL-ACNC: 13.3 SEC — SIGNIFICANT CHANGE UP (ref 10.6–13.6)
RBC # BLD: 4.19 M/UL — LOW (ref 4.2–5.8)
RBC # FLD: 14.5 % — SIGNIFICANT CHANGE UP (ref 10.3–14.5)
RBC BLD AUTO: NORMAL — SIGNIFICANT CHANGE UP
RBC CASTS # UR COMP ASSIST: SIGNIFICANT CHANGE UP /HPF (ref 0–4)
SODIUM SERPL-SCNC: 143 MMOL/L — SIGNIFICANT CHANGE UP (ref 135–145)
SP GR SPEC: 1.02 — SIGNIFICANT CHANGE UP (ref 1.01–1.02)
UROBILINOGEN FLD QL: NEGATIVE MG/DL — SIGNIFICANT CHANGE UP
WBC # BLD: 11.03 K/UL — HIGH (ref 3.8–10.5)
WBC # FLD AUTO: 11.03 K/UL — HIGH (ref 3.8–10.5)
WBC UR QL: SIGNIFICANT CHANGE UP

## 2021-09-26 PROCEDURE — 71045 X-RAY EXAM CHEST 1 VIEW: CPT | Mod: 26

## 2021-09-26 PROCEDURE — 99285 EMERGENCY DEPT VISIT HI MDM: CPT | Mod: CS

## 2021-09-26 RX ORDER — ACETAMINOPHEN 500 MG
650 TABLET ORAL ONCE
Refills: 0 | Status: COMPLETED | OUTPATIENT
Start: 2021-09-26 | End: 2021-09-26

## 2021-09-26 RX ORDER — CEFTRIAXONE 500 MG/1
1000 INJECTION, POWDER, FOR SOLUTION INTRAMUSCULAR; INTRAVENOUS ONCE
Refills: 0 | Status: COMPLETED | OUTPATIENT
Start: 2021-09-26 | End: 2021-09-26

## 2021-09-26 RX ORDER — SODIUM CHLORIDE 9 MG/ML
1000 INJECTION INTRAMUSCULAR; INTRAVENOUS; SUBCUTANEOUS ONCE
Refills: 0 | Status: COMPLETED | OUTPATIENT
Start: 2021-09-26 | End: 2021-09-26

## 2021-09-26 RX ORDER — AZITHROMYCIN 500 MG/1
500 TABLET, FILM COATED ORAL ONCE
Refills: 0 | Status: COMPLETED | OUTPATIENT
Start: 2021-09-26 | End: 2021-09-26

## 2021-09-26 RX ADMIN — SODIUM CHLORIDE 1000 MILLILITER(S): 9 INJECTION INTRAMUSCULAR; INTRAVENOUS; SUBCUTANEOUS at 21:33

## 2021-09-26 RX ADMIN — CEFTRIAXONE 100 MILLIGRAM(S): 500 INJECTION, POWDER, FOR SOLUTION INTRAMUSCULAR; INTRAVENOUS at 21:35

## 2021-09-26 RX ADMIN — AZITHROMYCIN 255 MILLIGRAM(S): 500 TABLET, FILM COATED ORAL at 22:17

## 2021-09-26 NOTE — ED PROVIDER NOTE - PHYSICAL EXAMINATION
Gen: Well appearing in NAD  Head: NC/AT  Neck: trachea midline  Cardiac: Tachycardic, regular rhythm  Resp:  No distress; rhonchi at the right base  Abd: Soft, NT/ND  Ext: no deformities  Neuro:  A&O appears non focal  Skin:  Warm and dry as visualized, Pale   Psych:  Normal affect and mood Gen: Well appearing in NAD  Head: NC/AT  Neck: trachea midline  Cardiac: Tachycardic, regular rhythm  Resp:  No distress; rhonchi at the right base, saturating 93% on RA  Abd: Soft, NT/ND  Ext: no deformities  Neuro:  A&O appears non focal  Skin:  Warm and dry as visualized, Pale   Psych:  Normal affect and mood

## 2021-09-26 NOTE — ED ADULT NURSE NOTE - OBJECTIVE STATEMENT
Patient presents to the ED c/o cough, nasal congestion and weakness.  Patient took dayquil yesterday AM.  Patient took at home rapid covid test which was negative.  Patient has no other complaints at this time.

## 2021-09-26 NOTE — ED PROVIDER NOTE - CROS ED CONS ALL NEG
Chief complaint:   Chief Complaint   Patient presents with   • Follow-up     MRI and medication   • Back Pain     can go down both legs       Vitals:  Visit Vitals  /70 (BP Location: LUE, Patient Position: Sitting, Cuff Size: Regular)   Pulse 80   Resp 16   Wt 73.2 kg   BMI 21.89 kg/m²       HISTORY OF PRESENT ILLNESS     HPI  Mr. Ford is a 29 year old male  with a history of  who presents today for a follow up visit. On the last visit he had medial branch blocks and was lost to follow up. He is unsure of how much they helped. He initially stated that they helped for a month and then stated that they did not help at all. He states that the pain is severe. He continues to have pain rated at 8 on a 9 cm VAS. The pain is described as a constant ahicng pain. The pain is localized to the low back. It radiates to the legs. He denies any associated symptoms. The pain worsens with activity and improves with nothing. The pain interferes with  sleep,  activity.  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     Priority: Low   • Fracture of distal end of tibia      Priority: Low     right     • Asthma      Priority: Low       PAST MEDICAL, FAMILY AND SOCIAL HISTORY     Medications:  Current Outpatient Medications   Medication   • acetaminophen (TYLENOL) 325 MG tablet   • [START ON 2/4/2019] amphetamine-dextroamphetamine (ADDERALL) 20 MG tablet   • DISPENSE   • albuterol 108 (90 Base) MCG/ACT inhaler   • naproxen (NAPROSYN) 500 MG tablet   • omeprazole (PRILOSEC) 20 MG capsule   • Multiple Vitamins-Minerals (MULTIVITAMIN PO)   • meloxicam (MOBIC) 15 MG tablet   • tiZANidine (ZANAFLEX)  4 MG tablet   • metoCLOPramide (REGLAN) 10 MG tablet   • Probiotic Product (PROBIOTIC & ACIDOPHILUS EX ST) Cap   • ARIPiprazole (ABILIFY) 15 MG tablet   • ARIPiprazole (ABILIFY) 5 MG tablet   • dicyclomine (BENTYL) 10 MG capsule     No current facility-administered medications for this visit.        Allergies:  ALLERGIES:  No Known Allergies    Past Medical  History/Surgeries:  Past Medical History:   Diagnosis Date   • ADD (attention deficit disorder)    • Arthritis    • Bipolar affective (CMS/HCC)    • Pernio        Past Surgical History:   Procedure Laterality Date   • Excise varicocele  12/31/2002       Family History:  Family History   Problem Relation Age of Onset   • Stroke Maternal Grandmother    • Stroke Maternal Grandfather    • Kidney disease Maternal Grandfather    • High blood pressure Mother    • High cholesterol Mother    • Arthritis Mother    • Heart disease Mother         2 stent   • Cancer Maternal Aunt        Social History:  Social History     Tobacco Use   • Smoking status: Current Every Day Smoker     Packs/day: 1.50     Types: Cigarettes   • Smokeless tobacco: Never Used   Substance Use Topics   • Alcohol use: No       REVIEW OF SYSTEMS     Review of Systems   Constitutional: Positive for activity change (decreased d/t pain) and appetite change (decreased).   HENT: Positive for congestion and sore throat.    Eyes: Negative.    Respiratory: Negative.    Cardiovascular: Positive for leg swelling (both feet).   Gastrointestinal: Positive for nausea.   Genitourinary: Negative.    Psychiatric/Behavioral: Positive for sleep disturbance.   A 10 point review of systems was carried out with the patient and was found to be negative except for the pertinent positives listed above    PHYSICAL EXAM     Physical Exam  Tenderness lumbar paraspinals  ASSESSMENT/PLAN     ASSESSMENT:   1. Chronic bilateral low back pain without sciatica    2. Facet arthropathy    3. Lumbar disc herniation      worsening  PLAN:  1.  The pathophysiology of the patient's pain symptoms were discussed in detail. Different treatment options were discussed.   2. Counseled on non medical management of pain including heat, ice, stretching, TENS, exercises and non opiate medications.  3. Goals: Improved functionality  4.  L5-S1 interlaminar epidural steroid injection  5. Follow up 2 weeks after injection  Orders Placed This Encounter   • SERVICE TO PHYSICAL THERAPY - external 10 visits   • acetaminophen (TYLENOL) 325 MG tablet   • meloxicam (MOBIC) 15 MG tablet   • tiZANidine (ZANAFLEX) 4 MG tablet     ASA: 2    Risks, benefits and alternative options discussed with the patient. All questions were answered to his complete satisfaction. Patient demonstrated complete understanding and agreed to proceed with the treatment plan.     - - -

## 2021-09-26 NOTE — ED ADULT TRIAGE NOTE - CHIEF COMPLAINT QUOTE
pt c/o fever and congestion that started yesterday last took Tylenol @10am, resp even and unlabored no distress noted, pt denies sob or chest pain

## 2021-09-26 NOTE — ED PROVIDER NOTE - OBJECTIVE STATEMENT
89 y/o male with PMHx of Anemia, Aortic stenosis, BPH, Depression, Endocarditis 2013, Fatigue, GI bleed, HLD, Macular degeneration, and Narcolepsy presents to ED c/o fever. Patient developed nasal congestion last night, when he woke up today he still had the congestion, and had a cough, did a home rapid COVID test that was negative. At 6pm, patient's son called 9-1-1 due to fever and weakness. Patient was noted to have a systolic BP of 220 when EMS arrived. Patient had DayQuil at 7am today. Patient is fully vaccinated for COVID, received Pfizer.     Denies N/V, urinary symptoms, recent travel, recent sick contacts, recent fall injury or trauma 89 y/o male with PMHx of Anemia, Aortic stenosis, BPH, Depression, Endocarditis 2013, Fatigue, GI bleed, HLD, Macular degeneration, and Narcolepsy presents to ED c/o fever. Patient developed nasal congestion last night, when he woke up today he still had the congestion, and had a cough, did a home rapid COVID test that was negative. At 6pm, patient's son called 9-1-1 due to fever and weakness. Patient was noted to have a systolic BP of 220 when EMS arrived and a temp of 102. Patient had DayQuil at 7am today. Patient is fully vaccinated for COVID, received Pfizer.     Denies N/V, urinary symptoms, recent travel, recent sick contacts, recent fall injury or trauma

## 2021-09-26 NOTE — ED PROVIDER NOTE - CLINICAL SUMMARY MEDICAL DECISION MAKING FREE TEXT BOX
Sepsis - undifferentiated, likely PNA  1) IV Access/IVF/LABS/Lactate (rpt after IVF if elevated)/UA/Cultures/RVP/COVID  2) CXR  3) IV Abx  4) Admit

## 2021-09-27 ENCOUNTER — TRANSCRIPTION ENCOUNTER (OUTPATIENT)
Age: 86
End: 2021-09-27

## 2021-09-27 DIAGNOSIS — R13.10 DYSPHAGIA, UNSPECIFIED: ICD-10-CM

## 2021-09-27 DIAGNOSIS — I10 ESSENTIAL (PRIMARY) HYPERTENSION: ICD-10-CM

## 2021-09-27 DIAGNOSIS — R50.9 FEVER, UNSPECIFIED: ICD-10-CM

## 2021-09-27 DIAGNOSIS — N40.0 BENIGN PROSTATIC HYPERPLASIA WITHOUT LOWER URINARY TRACT SYMPTOMS: ICD-10-CM

## 2021-09-27 DIAGNOSIS — J18.9 PNEUMONIA, UNSPECIFIED ORGANISM: ICD-10-CM

## 2021-09-27 DIAGNOSIS — Z02.9 ENCOUNTER FOR ADMINISTRATIVE EXAMINATIONS, UNSPECIFIED: ICD-10-CM

## 2021-09-27 DIAGNOSIS — F03.90 UNSPECIFIED DEMENTIA WITHOUT BEHAVIORAL DISTURBANCE: ICD-10-CM

## 2021-09-27 LAB
ANION GAP SERPL CALC-SCNC: 14 MMOL/L — SIGNIFICANT CHANGE UP (ref 5–17)
B PERT DNA SPEC QL NAA+PROBE: SIGNIFICANT CHANGE UP
BASOPHILS # BLD AUTO: 0.04 K/UL — SIGNIFICANT CHANGE UP (ref 0–0.2)
BASOPHILS NFR BLD AUTO: 0.4 % — SIGNIFICANT CHANGE UP (ref 0–2)
BUN SERPL-MCNC: 22.7 MG/DL — HIGH (ref 8–20)
C PNEUM DNA SPEC QL NAA+PROBE: SIGNIFICANT CHANGE UP
CALCIUM SERPL-MCNC: 8.5 MG/DL — LOW (ref 8.6–10.2)
CHLORIDE SERPL-SCNC: 106 MMOL/L — SIGNIFICANT CHANGE UP (ref 98–107)
CO2 SERPL-SCNC: 22 MMOL/L — SIGNIFICANT CHANGE UP (ref 22–29)
CREAT SERPL-MCNC: 0.78 MG/DL — SIGNIFICANT CHANGE UP (ref 0.5–1.3)
EOSINOPHIL # BLD AUTO: 0.02 K/UL — SIGNIFICANT CHANGE UP (ref 0–0.5)
EOSINOPHIL NFR BLD AUTO: 0.2 % — SIGNIFICANT CHANGE UP (ref 0–6)
FLUAV H1 2009 PAND RNA SPEC QL NAA+PROBE: SIGNIFICANT CHANGE UP
FLUAV H1 RNA SPEC QL NAA+PROBE: SIGNIFICANT CHANGE UP
FLUAV H3 RNA SPEC QL NAA+PROBE: SIGNIFICANT CHANGE UP
FLUAV SUBTYP SPEC NAA+PROBE: SIGNIFICANT CHANGE UP
FLUBV RNA SPEC QL NAA+PROBE: SIGNIFICANT CHANGE UP
GLUCOSE SERPL-MCNC: 135 MG/DL — HIGH (ref 70–99)
HADV DNA SPEC QL NAA+PROBE: SIGNIFICANT CHANGE UP
HCOV PNL SPEC NAA+PROBE: SIGNIFICANT CHANGE UP
HCT VFR BLD CALC: 36.5 % — LOW (ref 39–50)
HGB BLD-MCNC: 11.8 G/DL — LOW (ref 13–17)
HMPV RNA SPEC QL NAA+PROBE: SIGNIFICANT CHANGE UP
HPIV1 RNA SPEC QL NAA+PROBE: SIGNIFICANT CHANGE UP
HPIV2 RNA SPEC QL NAA+PROBE: SIGNIFICANT CHANGE UP
HPIV3 RNA SPEC QL NAA+PROBE: SIGNIFICANT CHANGE UP
HPIV4 RNA SPEC QL NAA+PROBE: SIGNIFICANT CHANGE UP
IMM GRANULOCYTES NFR BLD AUTO: 0.3 % — SIGNIFICANT CHANGE UP (ref 0–1.5)
LACTATE BLDV-MCNC: 2.3 MMOL/L — HIGH (ref 0.5–2)
LYMPHOCYTES # BLD AUTO: 1.02 K/UL — SIGNIFICANT CHANGE UP (ref 1–3.3)
LYMPHOCYTES # BLD AUTO: 9.3 % — LOW (ref 13–44)
MCHC RBC-ENTMCNC: 29.7 PG — SIGNIFICANT CHANGE UP (ref 27–34)
MCHC RBC-ENTMCNC: 32.3 GM/DL — SIGNIFICANT CHANGE UP (ref 32–36)
MCV RBC AUTO: 91.9 FL — SIGNIFICANT CHANGE UP (ref 80–100)
MONOCYTES # BLD AUTO: 0.61 K/UL — SIGNIFICANT CHANGE UP (ref 0–0.9)
MONOCYTES NFR BLD AUTO: 5.6 % — SIGNIFICANT CHANGE UP (ref 2–14)
NEUTROPHILS # BLD AUTO: 9.23 K/UL — HIGH (ref 1.8–7.4)
NEUTROPHILS NFR BLD AUTO: 84.2 % — HIGH (ref 43–77)
PLATELET # BLD AUTO: 139 K/UL — LOW (ref 150–400)
POTASSIUM SERPL-MCNC: 3.7 MMOL/L — SIGNIFICANT CHANGE UP (ref 3.5–5.3)
POTASSIUM SERPL-SCNC: 3.7 MMOL/L — SIGNIFICANT CHANGE UP (ref 3.5–5.3)
RAPID RVP RESULT: DETECTED
RBC # BLD: 3.97 M/UL — LOW (ref 4.2–5.8)
RBC # FLD: 14.6 % — HIGH (ref 10.3–14.5)
RSV RNA SPEC QL NAA+PROBE: DETECTED
RV+EV RNA SPEC QL NAA+PROBE: SIGNIFICANT CHANGE UP
SARS-COV-2 RNA SPEC QL NAA+PROBE: SIGNIFICANT CHANGE UP
SODIUM SERPL-SCNC: 141 MMOL/L — SIGNIFICANT CHANGE UP (ref 135–145)
WBC # BLD: 10.95 K/UL — HIGH (ref 3.8–10.5)
WBC # FLD AUTO: 10.95 K/UL — HIGH (ref 3.8–10.5)

## 2021-09-27 PROCEDURE — 99497 ADVNCD CARE PLAN 30 MIN: CPT

## 2021-09-27 PROCEDURE — 12345: CPT | Mod: NC

## 2021-09-27 PROCEDURE — 99222 1ST HOSP IP/OBS MODERATE 55: CPT

## 2021-09-27 PROCEDURE — 99223 1ST HOSP IP/OBS HIGH 75: CPT

## 2021-09-27 RX ORDER — MIRTAZAPINE 45 MG/1
1 TABLET, ORALLY DISINTEGRATING ORAL
Qty: 0 | Refills: 0 | DISCHARGE

## 2021-09-27 RX ORDER — ESCITALOPRAM OXALATE 10 MG/1
1 TABLET, FILM COATED ORAL
Qty: 0 | Refills: 0 | DISCHARGE

## 2021-09-27 RX ORDER — HEPARIN SODIUM 5000 [USP'U]/ML
5000 INJECTION INTRAVENOUS; SUBCUTANEOUS EVERY 12 HOURS
Refills: 0 | Status: DISCONTINUED | OUTPATIENT
Start: 2021-09-27 | End: 2021-10-01

## 2021-09-27 RX ORDER — UBIDECARENONE 100 MG
1 CAPSULE ORAL
Qty: 0 | Refills: 0 | DISCHARGE

## 2021-09-27 RX ORDER — MEMANTINE HYDROCHLORIDE 10 MG/1
1 TABLET ORAL
Qty: 0 | Refills: 0 | DISCHARGE

## 2021-09-27 RX ORDER — ACETAMINOPHEN 500 MG
650 TABLET ORAL EVERY 6 HOURS
Refills: 0 | Status: DISCONTINUED | OUTPATIENT
Start: 2021-09-27 | End: 2021-10-01

## 2021-09-27 RX ORDER — SODIUM CHLORIDE 9 MG/ML
1000 INJECTION, SOLUTION INTRAVENOUS
Refills: 0 | Status: DISCONTINUED | OUTPATIENT
Start: 2021-09-27 | End: 2021-09-28

## 2021-09-27 RX ORDER — TAMSULOSIN HYDROCHLORIDE 0.4 MG/1
2 CAPSULE ORAL
Qty: 0 | Refills: 0 | DISCHARGE

## 2021-09-27 RX ORDER — LANOLIN ALCOHOL/MO/W.PET/CERES
3 CREAM (GRAM) TOPICAL AT BEDTIME
Refills: 0 | Status: DISCONTINUED | OUTPATIENT
Start: 2021-09-27 | End: 2021-10-01

## 2021-09-27 RX ORDER — SODIUM CHLORIDE 9 MG/ML
1000 INJECTION, SOLUTION INTRAVENOUS
Refills: 0 | Status: DISCONTINUED | OUTPATIENT
Start: 2021-09-27 | End: 2021-09-27

## 2021-09-27 RX ORDER — METOPROLOL TARTRATE 50 MG
5 TABLET ORAL EVERY 12 HOURS
Refills: 0 | Status: DISCONTINUED | OUTPATIENT
Start: 2021-09-27 | End: 2021-09-28

## 2021-09-27 RX ORDER — METOPROLOL TARTRATE 50 MG
1 TABLET ORAL
Qty: 0 | Refills: 0 | DISCHARGE

## 2021-09-27 RX ORDER — MIRTAZAPINE 45 MG/1
15 TABLET, ORALLY DISINTEGRATING ORAL AT BEDTIME
Refills: 0 | Status: DISCONTINUED | OUTPATIENT
Start: 2021-09-27 | End: 2021-09-27

## 2021-09-27 RX ORDER — METOPROLOL TARTRATE 50 MG
25 TABLET ORAL
Refills: 0 | Status: DISCONTINUED | OUTPATIENT
Start: 2021-09-27 | End: 2021-09-27

## 2021-09-27 RX ORDER — ESCITALOPRAM OXALATE 10 MG/1
20 TABLET, FILM COATED ORAL DAILY
Refills: 0 | Status: DISCONTINUED | OUTPATIENT
Start: 2021-09-27 | End: 2021-10-01

## 2021-09-27 RX ORDER — MIRTAZAPINE 45 MG/1
2 TABLET, ORALLY DISINTEGRATING ORAL
Qty: 0 | Refills: 0 | DISCHARGE

## 2021-09-27 RX ORDER — METOPROLOL TARTRATE 50 MG
12.5 TABLET ORAL EVERY 12 HOURS
Refills: 0 | Status: DISCONTINUED | OUTPATIENT
Start: 2021-09-27 | End: 2021-09-27

## 2021-09-27 RX ORDER — MEMANTINE HYDROCHLORIDE 10 MG/1
10 TABLET ORAL DAILY
Refills: 0 | Status: DISCONTINUED | OUTPATIENT
Start: 2021-09-27 | End: 2021-10-01

## 2021-09-27 RX ORDER — ASPIRIN/CALCIUM CARB/MAGNESIUM 324 MG
1 TABLET ORAL
Qty: 0 | Refills: 0 | DISCHARGE

## 2021-09-27 RX ORDER — ACETAMINOPHEN 500 MG
650 TABLET ORAL EVERY 6 HOURS
Refills: 0 | Status: DISCONTINUED | OUTPATIENT
Start: 2021-09-27 | End: 2021-09-27

## 2021-09-27 RX ORDER — ONDANSETRON 8 MG/1
4 TABLET, FILM COATED ORAL EVERY 8 HOURS
Refills: 0 | Status: DISCONTINUED | OUTPATIENT
Start: 2021-09-27 | End: 2021-10-01

## 2021-09-27 RX ORDER — MIRTAZAPINE 45 MG/1
7.5 TABLET, ORALLY DISINTEGRATING ORAL AT BEDTIME
Refills: 0 | Status: DISCONTINUED | OUTPATIENT
Start: 2021-09-27 | End: 2021-10-01

## 2021-09-27 RX ORDER — TAMSULOSIN HYDROCHLORIDE 0.4 MG/1
0.8 CAPSULE ORAL AT BEDTIME
Refills: 0 | Status: DISCONTINUED | OUTPATIENT
Start: 2021-09-27 | End: 2021-10-01

## 2021-09-27 RX ADMIN — MEMANTINE HYDROCHLORIDE 10 MILLIGRAM(S): 10 TABLET ORAL at 13:29

## 2021-09-27 RX ADMIN — HEPARIN SODIUM 5000 UNIT(S): 5000 INJECTION INTRAVENOUS; SUBCUTANEOUS at 20:00

## 2021-09-27 RX ADMIN — ESCITALOPRAM OXALATE 20 MILLIGRAM(S): 10 TABLET, FILM COATED ORAL at 13:29

## 2021-09-27 RX ADMIN — HEPARIN SODIUM 5000 UNIT(S): 5000 INJECTION INTRAVENOUS; SUBCUTANEOUS at 06:52

## 2021-09-27 RX ADMIN — MIRTAZAPINE 7.5 MILLIGRAM(S): 45 TABLET, ORALLY DISINTEGRATING ORAL at 22:30

## 2021-09-27 RX ADMIN — Medication 25 MILLIGRAM(S): at 06:52

## 2021-09-27 RX ADMIN — TAMSULOSIN HYDROCHLORIDE 0.8 MILLIGRAM(S): 0.4 CAPSULE ORAL at 22:30

## 2021-09-27 NOTE — H&P ADULT - HISTORY OF PRESENT ILLNESS
"History and Physical:    Subjective     Chief Complaint   Patient presents with   • Scheduled Induction       Alicia Diaz is a 35 y.o. year old  with an Estimated Date of Delivery: 18 currently at 40w0d presenting with here for scheduled induction..    Prenatal care has been with Mariam Kim DO.  It has been significant for Normal pregnancy.        Review of Systems  Pertinent items are noted in HPI.  A comprehensive review of systems was negative.     Past Medical History:   Diagnosis Date   • Asthma    • Irritable bowel syndrome      Past Surgical History:   Procedure Laterality Date   • WISDOM TOOTH EXTRACTION       Family History   Problem Relation Age of Onset   • Diabetes Paternal Grandmother      Social History   Substance Use Topics   • Smoking status: Never Smoker   • Smokeless tobacco: Never Used   • Alcohol use No     Prescriptions Prior to Admission   Medication Sig Dispense Refill Last Dose   • mometasone-formoterol (DULERA 100) 100-5 MCG/ACT inhaler Inhale 2 puffs 2 (Two) Times a Day.   2018 at Unknown time   • montelukast (SINGULAIR) 10 MG tablet Take 10 mg by mouth Every Night.   2018 at Unknown time   • Prenatal Vit-Fe Fumarate-FA (PRENATAL 27-1) 27-1 MG tablet tablet Take 1 tablet by mouth Daily.   2018 at Unknown time   • albuterol (PROVENTIL) (2.5 MG/3ML) 0.083% nebulizer solution Take 2.5 mg by nebulization Every 4 (Four) Hours As Needed for Wheezing.   Unknown at Unknown time     Allergies:  Bactrim [sulfamethoxazole-trimethoprim]  OB History    Para Term  AB Living   2 1 1   1   SAB TAB Ectopic Multiple Live Births       1      # Outcome Date GA Lbr Jose/2nd Weight Sex Delivery Anes PTL Lv   2 Current            1 Term 10/19/14 40w2d  3515 g (7 lb 12 oz) M Vag-Spont EPI  VICKEY            Objective     /73 (BP Location: Right arm, Patient Position: Lying)  Pulse 84  Temp 98.5 °F (36.9 °C) (Oral)   Resp 16  Ht 162.6 cm (64\")  Wt 70.3 kg (155 lb) "  Breastfeeding? Yes  BMI 26.61 kg/m2    Physical Exam    General:  No acute distress           Abdomen: Gravid, nontender       FHT's: reactive    Cervix: 3/80/-1 with bag of membranes palpated   [place]: Contraction are 2-5     Lab Review   External Prenatal Results         Pregnancy Outside Results - these were transcribed from office records.  See scanned records for details. Date Time   Hgb      Hct      ABO      Rh      Antibody Screen      Glucose Fasting GTT      Glucose Tolerance Test 1 hour ^ 139  11/15/17    Glucose Tolerance Test 3 hour      Gonorrhea (discrete)      Chlamydia (discrete)      RPR ^ Non-Reactive  07/12/17    VDRL      Syphillis Antibody      Rubella ^ Immune  07/12/17    HBsAg ^ Negative  07/12/17    Herpes Simplex Virus PCR      Herpes Simplex VIrus Culture      HIV ^ Non-Reactive  07/12/17    Hep C RNA Quant PCR ^ Non Reactive  07/12/17    Hep C Antibody      Urine Drug Screen ^ Negative  07/12/17    AFP      Group B Strep ^ Negative  01/15/18    GBS Susceptibility to Clindamycin      GBS Susceptibility to Eythromycin      Fetal Fibronectin      Genetic Testing, Maternal Blood             Legend: ^: Historical              Assessment/Plan     ASSESSMENT  1. IUP at 40w0d  2. induction of labor   3. GBSnegative  PLAN  1. Admit to labor and delivery   2.  pitocin for augmenation   3.  AROM when appropriate         Mariam Kim DO  2/5/2018@   89yo M with PMHx of Anemia, Aortic stenosis, BPH, Depression, Endocarditis 2013, Fatigue, GI bleed, HLD, Macular degeneration, and Narcolepsy presents to ED c/o fever. Pt poor historian but states that he had nasal congestion and dry cough x 1 day. As per chart pt had home rapid COVID test that was negative. Pt was c/o worsening fever and weakness brought to ED for further evaluation. Patient is fully vaccinated for COVID with Pfizer. In the ED febrile Tmax 102.9, lactate 2.8, mild leukocytosis, cxr no significant finding for infiltrate or consolidation(unofficial read), UA negative, pt s/p 1L NS, ceftriaxone and Zithromax for suspected pneumonia in the ED. RVP came back positive for RSV, COVID PCR neg. currently pt denies cp, sob palpitations. Pt was placed on 2L nc for mild hypoxia.

## 2021-09-27 NOTE — SWALLOW BEDSIDE ASSESSMENT ADULT - SWALLOW EVAL: DIAGNOSIS
Oral stage generally WFL for small amounts of puree. Suspected pharyngeal dysphagia, marked by suspected reduced hyo-laryngeal elevation/excursion, w/wet vocal quality & delayed congested cough. ?Esophageal etiology, following cough, Pt with eventual regurgitation of PO trials.

## 2021-09-27 NOTE — PROGRESS NOTE ADULT - PROBLEM SELECTOR PLAN 1
- 2/2 RSV  -febrile, mild leukocytosis, lactate 2.8  -s/p 1L NS bolus, ceftriaxone and Zithromax  -CXR no significant finding for infiltrate or consolidation(unofficial read), UA negative  -RVP +RSV  -fever likely due to acute viral illness, doubt pneumonia at this time    -cont with supportive care   -mild hypoxia requiring 2L NC, titrated off as tolerated   -Pending cultures   - Appreciate ID recs- will -febrile, mild leukocytosis, lactate 2.8  -CXR no significant finding for infiltrate or consolidation(unofficial read), UA negative  -RVP +RSV  -mild hypoxia requiring 2L NC, titrated off as tolerated   -Pending cultures   - Appreciate ID recs- will supportive care

## 2021-09-27 NOTE — DISCHARGE NOTE NURSING/CASE MANAGEMENT/SOCIAL WORK - NSDCVIVACCINE_GEN_ALL_CORE_FT
influenza, injectable, quadrivalent, preservative free; 19-Nov-2017 09:08; Heidi Ureña); [employer]; [id number]; IntraMuscular; Deltoid Left.; 0.5 milliLiter(s); VIS (VIS Published: 07-Aug-2015, VIS Presented: 19-Nov-2017);   influenza, injectable, quadrivalent, preservative free; 19-Nov-2017 11:15; Heidi Ureña); [employer]; [id number]; IntraMuscular; Deltoid Left.; 0.5 milliLiter(s); VIS (VIS Published: 07-Aug-2015, VIS Presented: 19-Nov-2017);

## 2021-09-27 NOTE — H&P ADULT - ASSESSMENT
89yo M with PMHx of Anemia, Aortic stenosis, BPH, Depression, Endocarditis 2013, Fatigue, GI bleed, HLD, Macular degeneration, and Narcolepsy presents to ED c/o fever.    Fever due to +RSV   -febrile, mild leukocytosis, lactate 2.8  -s/p 1L NS bolus, ceftriaxone and Zithromax  -CXR no significant finding for infiltrate or consolidation(unofficial read), UA negative  -RVP +RSV  -fever likely due to acute viral illness, doubt pneumonia at this time   -hold off further abx   -cont with supportive care   -mild hypoxia requiring 2L NC, titrated off as tolerated   -Pending cultures     HTN  -initially hypertensive on ER presentation, now controlled   -cont with Metoprolol 25mg bid     Dementia with behavior disturbance   -cont with memantine, mirtazapine and Lexapro     BPH  -cont flomax     DVT ppx   -Heparin subq

## 2021-09-27 NOTE — PROGRESS NOTE ADULT - PROBLEM SELECTOR PLAN 4
- Per son, at baseline he has Alzheimer's Disease and has difficulty hearing. Alert and oriented. Has HHA 12 hours for 7 days a week.   - cont with memantine, mirtazapine and Lexapro

## 2021-09-27 NOTE — DISCHARGE NOTE NURSING/CASE MANAGEMENT/SOCIAL WORK - PATIENT PORTAL LINK FT
You can access the FollowMyHealth Patient Portal offered by [facility] by registering at the following website: [email or web address]. By joining [employer] [facility]’s FollowMyHealth portal, you will also be able to view your health information using other applications (apps) compatible with our system.

## 2021-09-27 NOTE — CONSULT NOTE ADULT - ASSESSMENT
1/4/2021       RE: Anita Bennett  [address]  [place] [address]     Dear Colleague,    Thank you for referring your patient, Anita Bennett, to the [facility] PHYSICAL MEDICINE [facility] REHABILITATION CLINIC [place] at [facility]. Please see a copy of my visit note below.    The patient returns for a follow-up visit for her ongoing issues related to spastic hemiparesis affecting the right side.     She has been  seen by me previously at [facility] stroke [place].     She reports her function is affected and she needs a little more help.  She has some sadness but is tolerating her antidepressant.     Pain in the neck and shoulder region is also limiting. Botox done on 10/12/20 helped.    Past Medical History:   Diagnosis Date     Acute ischemic left MCA stroke (H) 3/22/15     Essential hypertension, benign         Current Outpatient Medications   Medication Sig Dispense Refill     aspirin (ASA) 325 MG tablet TAKE 1 TABLET BY MOUTH EVERY DAY 90 tablet 3     azelastine (OPTIVAR) 0.05 % ophthalmic solution Place 1 drop into both eyes 2 times daily as needed (for itchy water eyes) 5 mL 11     baclofen (LIORESAL) 20 MG tablet Take 1 tablet (20 mg) by mouth 3 times daily 90 tablet 3     escitalopram (LEXAPRO) 10 MG tablet Take 10 mg by mouth daily       gabapentin (NEURONTIN) 400 MG capsule 1 capsule 3 times daily for arm mobility.       hydrochlorothiazide (HYDRODIURIL) 12.5 MG tablet TAKE 1 TABLET (12.5 MG) BY MOUTH DAILY AS NEEDED FOR BLOOD PRESSURE. 90 tablet 1     losartan (COZAAR) 50 MG tablet TAKE 1 TABLET (50 MG) BY MOUTH DAILY AS NEEDED FOR BLOOD PRESSURE. 90 tablet 2     metoprolol succinate ER (TOPROL-XL) 50 MG 24 hr tablet Take 1 tablet (50 mg) by mouth daily for blood pressure. 90 tablet 1     nabumetone (RELAFEN) 500 MG tablet TAKE 1-2 TABLETS BY MOUTH TWICE A DAY AS NEEDED FOR PAIN IN ARM OR HIP, TAKE WITH FOOD. 60 tablet 2     order for DME Equipment being  This 89yo M with CURRENT MEDICAL CONDITIONS of Anemia, Aortic stenosis, BPH, Depression, HLD, Macular degeneration, Narcolepsy, AND hx of Endocarditis 2013, Fatigue, GI bleed, presents to ED c/o fever. Pt poor historian but states that he had nasal congestion and dry cough x 1 day. As per chart pt had home rapid COVID test that was negative. Pt was c/o worsening fever and weakness brought to ED for further evaluation. Patient is fully vaccinated for COVID with Pfizer. In the ED febrile Tmax 102.9, lactate 2.8, mild leukocytosis, cxr no significant finding for infiltrate or consolidation(unofficial read), UA negative, pt s/p 1L NS, ceftriaxone and Zithromax for suspected pneumonia in the ED. RVP came back positive for RSV, COVID PCR neg. currently pt denies cp, sob palpitations. Pt was placed on 2L nc for mild hypoxia.        (27 Sep 2021 01:24)    patient admitted for care. Poor historian.   RSV positive.  COVID-19 negative on PCR.       Impression:  Viral Pneumonia  Shortness of breath  fevers    Plan:  - continue supportive therapy.   - continue oxygen    - agree with holding off antibiotics for now.     WBC elevation is reactive  - will follow and trend      - follow up all outstanding cultures  - trend temperature and WBC curve  - repeat cultures from blood and all sources if febrile.      No further specific infectious disease recommendations. Will be available as needed.   ordered: gloves for use in patient post stroke with incontinence 100 Device 11     pantoprazole (PROTONIX) 40 MG EC tablet Take 1 tablet (40 mg) by mouth daily for reflux. 90 tablet 1     SENEXON-S 8.6-50 MG tablet TAKE 1 TABLET BY MOUTH DAILY AS NEEDED FOR CONSTIPATION 30 tablet 11     simvastatin (ZOCOR) 20 MG tablet Take 1 tablet (20 mg) by mouth every evening for cholesterol. 90 tablet 1     triamcinolone (KENALOG) 0.1 % cream Apply sparingly to affected area three times daily as needed 80 g 0     VITAMIN D3 25 MCG (1000 UT) tablet TAKE 1 TABLET BY MOUTH EVERY DAY 30 tablet 10         /82   Pulse 61   Resp 16   LMP  (LMP Unknown)        On examination, the patient is in her manual wheelchair.  She transfers with assist of her .  She has involvement of right levator scapula, right pectoralis major, right biceps brachii, flexor carpi ulnaris and flexor carpi radialis, flexor digitorum superficialis and flexor digitorum profundus.  She has increased tone in the flexor pollicis longus and pronator teres  And lumbricals. In the lower extremity tibialis posterior and gastrocnemius are tight.     Impression: Right spastic hemiparesis, torticollis, gait abnormality and spasm of muscles due to cerebrovascular accident.      Based on today's assessment, she would benefit from 600 units of Botox injections.  I will see her in follow-up in about a month's time to see how she is responding and plan future treatments every 12 weeks.     30 minutes for the evaluation and management portion of the visit, greater than 50% was for counseling on hemiparesis and treatment.     Procedure note: With her informed consent, after explaining the benefits and risks of the procedure, and using Betasept for skin prep, EMG for localization, preservative-free normal saline for dilution, 600 units of Botox, lot numbers  [id number]   Expiration Sep 2023, 100 units were injected into the right pectoralis major, 25 units to right  levator scapulae and the neck 25 needs to right flexor pollicis longus, 50 unis for injected into the following muscles lumbricals/PI, pronator teres,  flexor carpi radialis, flexor carpi ulnaris, flexor digitorum  Superficialis, flexor digitorum profundus.  50 units each were injected into tibialis posterior and 100 units into the gastrocnemius.. 600 units were utilized in all.  She tolerated it well.  She will use ice, Tylenol as needed.     Cheng Jean MD

## 2021-09-27 NOTE — PROGRESS NOTE ADULT - PROBLEM SELECTOR PLAN 3
-cont with Metoprolol 25mg bid - discontinued Metoprolol 25mg bid. Started lopressor 12.5 mg Q12H given NPO

## 2021-09-27 NOTE — DISCHARGE NOTE NURSING/CASE MANAGEMENT/SOCIAL WORK - NSDCFUADDAPPT_GEN_ALL_CORE_FT
IT IS VERY IMPORTANT THAT YOU KEEP ALL APPOINTMENTS  CARDIO - COLTEN  YOU ARE OPEN TO MEDS TO BED BUT PREFERS TO KEEP [facility] FOR LT RX NEEDS. YOU DENY THE NEED FOR RX INTERVENTION FOR TEACHING AND STATE YOU CAN AFFORD ALL MEDS. IT IS VERY IMPORTANT THAT YOU KEEP ALL APPOINTMENTS  YOU HAVE AN APT W/ DR ABEL ([facility]) ON 10/6 AT 3PM  YOU ARE OPEN TO MEDS TO BED BUT PREFER TO KEEP [facility] FOR LT RX NEEDS.   YOU DENY THE NEED FOR RX INTERVENTION FOR TEACHING AND STATE YOU CAN AFFORD ALL MEDS.

## 2021-09-27 NOTE — PROGRESS NOTE ADULT - PROBLEM SELECTOR PLAN 6
- DVT ppx- heparin ppx  - Diet- NPO given recent S/S evaluation  - Dispo- pending clinical improvement. F/u PT recs    - Updated patient's son, Dave ([facility])  [phone].    Patient is actively acute, no plan for discharge at this time pending clinical course. Patient is at high risk at the moment due to fever 2/2 RSV requiring NC. - DVT ppx- heparin ppx  - Diet- NPO given recent S/S evaluation  - Dispo- pending clinical improvement. F/u PT recs    - Updated patient's son, Dave ([facility])  [phone] on 9/27  - CODE STATUS- DNR/DNI    Patient is actively acute, no plan for discharge at this time pending clinical course. Patient is at high risk at the moment due to fever 2/2 RSV requiring NC.

## 2021-09-27 NOTE — SWALLOW BEDSIDE ASSESSMENT ADULT - SLP GENERAL OBSERVATIONS
Recd awake/upright in stretcher in ED, A&A Ox1 w/cues, reduced cognition, +[place], +4L NC SpO2 >94% throughout, son present for encounter, 0/10 nonverbal pain pre/post

## 2021-09-27 NOTE — ED ADULT NURSE REASSESSMENT NOTE - NS ED NURSE REASSESS COMMENT FT1
Pt is resting in stretcher in no acute distress jacqueline pineda
Pt remains on cardiac monitor , maintenance fluids running, son at the bedside, in no acute distress elao rn
Pt report received, admitted to flr, pending bed placement , in no acute distress jacqueline pineda
received report from ruth pineda and assumed care of pt. pt sitting calm in bed. lethargic and oriented to person and place. breathing even and unlabored on 3l o2 via nc. nsr on cm. pt has no complaints at this time. moved to peds 2 and droplet isolation observed. admitted and awaiting bed. will continue to monitor.

## 2021-09-27 NOTE — H&P ADULT - DOES THIS PATIENT HAVE A HISTORY OF OR HAS BEEN DX WITH HEART FAILURE?
no Alert and oriented, no focal deficits, no motor or sensory deficits. +wound to left elbow(wrapped)

## 2021-09-27 NOTE — PROGRESS NOTE ADULT - ASSESSMENT
89yo M with PMHx of Anemia, Aortic stenosis, BPH, Depression, Endocarditis 2013, Fatigue, GI bleed, HLD, Macular degeneration, and Narcolepsy presents to ED c/o fever.

## 2021-09-27 NOTE — PROGRESS NOTE ADULT - PROBLEM SELECTOR PLAN 2
- Patient eats regular diet at home  - aspiration precautions, keep HOB> 30  - Patient w/ failed S/S evaluation. Will keep NPO and S/S to reassess tomorrow 9/28.

## 2021-09-27 NOTE — H&P ADULT - NSHPPHYSICALEXAM_GEN_ALL_CORE
T(C): 37.8 (09-27-21 @ 03:15), Max: 39.4 (09-26-21 @ 21:45)  HR: 75 (09-27-21 @ 03:24) (75 - 103)  BP: 134/60 (09-27-21 @ 03:24) (129/62 - 182/88)  RR: 19 (09-27-21 @ 03:24) (19 - 20)  SpO2: 96% (09-27-21 @ 03:24) (91% - 97%)    GENERAL: patient appears well, no acute distress, appropriate, pleasant  EYES: sclera clear, no exudates  ENMT: oropharynx clear without erythema, no exudates, moist mucous membranes  NECK: supple, soft, no thyromegaly noted  LUNGS: good air entry bilaterally, clear to auscultation, symmetric breath sounds, no wheezing or rhonchi appreciated  HEART: soft S1/S2, regular rate and rhythm, no murmurs noted, no lower extremity edema  GASTROINTESTINAL: abdomen is soft, nontender, nondistended, normoactive bowel sounds, no palpable masses  INTEGUMENT: good skin turgor, warm skin, appears well perfused  MUSCULOSKELETAL: no clubbing or cyanosis, no obvious deformity  NEUROLOGIC: awake, alert, oriented x2, good muscle tone in 4 extremities, no obvious sensory deficits  PSYCHIATRIC: mood is good, affect is congruent, linear and logical thought process  HEME/LYMPH: no palpable supraclavicular nodules, no obvious ecchymosis or petechiae

## 2021-09-27 NOTE — CONSULT NOTE ADULT - SUBJECTIVE AND OBJECTIVE BOX
[facility]  INFECTIOUS DISEASES  at [place]  =======================================================  Radhames Up MD  Diplomates American Board of Internal Medicine and Infectious Diseases  Tel  [phone]  Fax [phone]  =======================================================    [id number]  PRASHANT MORALES   HPI:  This 89yo M with CURRENT MEDICAL CONDITIONS of Anemia, Aortic stenosis, BPH, Depression, HLD, Macular degeneration, Narcolepsy, AND hx of Endocarditis 2013, Fatigue, GI bleed, presents to ED c/o fever. Pt poor historian but states that he had nasal congestion and dry cough x 1 day. As per chart pt had home rapid COVID test that was negative. Pt was c/o worsening fever and weakness brought to ED for further evaluation. Patient is fully vaccinated for COVID with Pfizer. In the ED febrile Tmax 102.9, lactate 2.8, mild leukocytosis, cxr no significant finding for infiltrate or consolidation(unofficial read), UA negative, pt s/p 1L NS, ceftriaxone and Zithromax for suspected pneumonia in the ED. RVP came back positive for RSV, COVID PCR neg. currently pt denies cp, sob palpitations. Pt was placed on 2L nc for mild hypoxia.        (27 Sep 2021 01:24)    patient admitted for care. Poor historian.   RSV positive.  COVID-19 negative on PCR.         I have personally reviewed the labs and data; pertinent labs and data are listed in this note; please see below.   =======================================================  Past Medical & Surgical Hx:  =====================  PAST MEDICAL & SURGICAL HISTORY:  Fatigue  BPH (benign prostatic hyperplasia)  Endocarditis 2013  Anemia  Depression  HLD (hyperlipidemia)  GI bleed  Aortic stenosis  Narcolepsy  UTI (urinary tract infection) 2013  Macular degeneration  S/P cataract extraction and insertion of intraocular lens bilat  S/P TAVR (transcatheter aortic valve replacement)    Problem List:  ==========  HEALTH ISSUES - PROBLEM Dx:    Social Hx:  =======  no toxic habits currently    FAMILY HISTORY:  No pertinent family history in first degree relatives    no significant family history of immunosuppressive disorders in mother or father   =======================================================    REVIEW OF SYSTEMS:  CONSTITUTIONAL:  No Fever or chills  HEENT:  No diplopia or blurred vision.  No earache, sore throat or runny nose.  CARDIOVASCULAR:  No pressure, squeezing, strangling, tightness, heaviness or aching about the chest, neck, axilla or epigastrium.  RESPIRATORY:   as PER HPI  GASTROINTESTINAL:  No nausea, vomiting or diarrhea.  GENITOURINARY:  No dysuria, frequency or urgency. No Blood in urine  MUSCULOSKELETAL:  no joint aches, no muscle pain  SKIN:  No change in skin, hair or nails.  NEUROLOGIC:  No Headaches, seizures or weakness.  PSYCHIATRIC:  No disorder of thought or mood.  ENDOCRINE:  No heat or cold intolerance  HEMATOLOGICAL:  No easy bruising or bleeding.    =======================================================  Allergies  No Known Allergies    Antibiotics:    Other medications:  escitalopram 20 milliGRAM(s) Oral daily  heparin   Injectable 5000 Unit(s) SubCutaneous every 12 hours  lactated ringers. 1000 milliLiter(s) IV Continuous <Continuous>  memantine 10 milliGRAM(s) Oral daily  metoprolol tartrate 25 milliGRAM(s) Oral two times a day  mirtazapine 7.5 milliGRAM(s) Oral at bedtime  tamsulosin 0.8 milliGRAM(s) Oral at bedtime     azithromycin  IVPB   255 mL/Hr IV Intermittent (09-26-21 @ 22:17)    cefTRIAXone   IVPB   100 mL/Hr IV Intermittent (09-26-21 @ 21:35)      ======================================================  Physical Exam:  ============  T(F): 98.6 (27 Sep 2021 11:14), Max: 102.9 (26 Sep 2021 21:45)  HR: 65 (27 Sep 2021 11:14)  BP: 147/66 (27 Sep 2021 11:14)  RR: 16 (27 Sep 2021 11:14)  SpO2: 97% (27 Sep 2021 11:14) (91% - 97%)  temp max in last 48H T(F): , Max: 102.9 (09-26-21 @ 21:45)Height (cm): 167.6 (09-26-21 @ 19:34)  Weight (kg): 72.6 (09-26-21 @ 19:42)  BMI (kg/m2): 25.8 (09-26-21 @ 19:42)  BSA (m2): 1.82 (09-26-21 @ 19:42)    General:  No acute distress.  STATED AGE  Eye: Pupils are equal, round and reactive to light, Extraocular movements are intact, Normal conjunctiva.  HENT: Normocephalic, Oral mucosa is moist, No pharyngeal erythema, No sinus tenderness.  Neck: Supple, No lymphadenopathy.  Respiratory: Lungs with COARSE breath sounds. POOR AIR ENTRY  Cardiovascular: Normal rate, Regular rhythm,   Gastrointestinal: Soft, Non-tender, Non-distended, Normal bowel sounds.  Genitourinary: No waddell  Lymphatics: No lymphadenopathy neck,   Musculoskeletal: Normal range of motion, Normal strength.  Integumentary: No rash.  Neurologic: Awake, No focal deficits, Cranial Nerves II-XII are grossly intact.    =======================================================  Labs:                        11.8   10.95 )-----------( 139      ( 27 Sep 2021 07:38 )             36.5     09-27    141  |  106  |  22.7<H>  ----------------------------<  135<H>  3.7   |  22.0  |  0.78    Ca    8.5<L>      27 Sep 2021 07:38    TPro  7.0  /  Alb  4.3  /  TBili  0.4  /  DBili  x   /  AST  20  /  ALT  14  /  AlkPhos  65  09-26      Creatinine, Serum: 0.78 mg/dL (09-27-21 @ 07:38)  Creatinine, Serum: 0.96 mg/dL (09-26-21 @ 21:41)      Respiratory Viral Panel with COVID-19 by LINA (09.26.21 @ 21:42)    Rapid RVP Result: Detected  RSV          < from: Xray Chest 1 View-PORTABLE IMMEDIATE (09.26.21 @ 21:03) >     EXAM:  XR CHEST PORTABLE IMMED 1V                          PROCEDURE DATE:  09/26/2021          INTERPRETATION:  AP chest on September 26, 2021 at 8:54 PM. Patient has sepsis.    Elevated right hemidiaphragm again noted.    Heart magnified by technique.    On July 30, 2019 there is bilateral lung infiltration right greater than left.    Presently lungs are grossly clear.    Otherwise stable findings.    IMPRESSION: Grossly clear lungs at this time.    --- End of Report ---           AMPARO FRIED MD; Attending Radiologist  This document has been electronically signed. Sep 27 2021  8:31AM    < end of copied text >

## 2021-09-27 NOTE — SWALLOW BEDSIDE ASSESSMENT ADULT - SLP PERTINENT HISTORY OF CURRENT PROBLEM
As per MD note, "89yo M with PMHx of Anemia, Aortic stenosis, BPH, Depression, Endocarditis 2013, Fatigue, GI bleed, HLD, Macular degeneration, and Narcolepsy presents to ED c/o fever admit with +RSV".

## 2021-09-28 LAB
ALBUMIN SERPL ELPH-MCNC: 3.6 G/DL — SIGNIFICANT CHANGE UP (ref 3.3–5.2)
ALP SERPL-CCNC: 60 U/L — SIGNIFICANT CHANGE UP (ref 40–120)
ALT FLD-CCNC: 13 U/L — SIGNIFICANT CHANGE UP
ANION GAP SERPL CALC-SCNC: 15 MMOL/L — SIGNIFICANT CHANGE UP (ref 5–17)
AST SERPL-CCNC: 34 U/L — SIGNIFICANT CHANGE UP
BILIRUB SERPL-MCNC: 0.5 MG/DL — SIGNIFICANT CHANGE UP (ref 0.4–2)
BUN SERPL-MCNC: 20.6 MG/DL — HIGH (ref 8–20)
CALCIUM SERPL-MCNC: 8.3 MG/DL — LOW (ref 8.6–10.2)
CHLORIDE SERPL-SCNC: 106 MMOL/L — SIGNIFICANT CHANGE UP (ref 98–107)
CO2 SERPL-SCNC: 23 MMOL/L — SIGNIFICANT CHANGE UP (ref 22–29)
COVID-19 SPIKE DOMAIN AB INTERP: POSITIVE
COVID-19 SPIKE DOMAIN ANTIBODY RESULT: 117 U/ML — HIGH
CREAT SERPL-MCNC: 0.87 MG/DL — SIGNIFICANT CHANGE UP (ref 0.5–1.3)
GLUCOSE SERPL-MCNC: 121 MG/DL — HIGH (ref 70–99)
HCT VFR BLD CALC: 34.3 % — LOW (ref 39–50)
HGB BLD-MCNC: 11.2 G/DL — LOW (ref 13–17)
LACTATE SERPL-SCNC: 1.9 MMOL/L — SIGNIFICANT CHANGE UP (ref 0.5–2)
MCHC RBC-ENTMCNC: 29.6 PG — SIGNIFICANT CHANGE UP (ref 27–34)
MCHC RBC-ENTMCNC: 32.7 GM/DL — SIGNIFICANT CHANGE UP (ref 32–36)
MCV RBC AUTO: 90.7 FL — SIGNIFICANT CHANGE UP (ref 80–100)
PLATELET # BLD AUTO: 137 K/UL — LOW (ref 150–400)
POTASSIUM SERPL-MCNC: 3.8 MMOL/L — SIGNIFICANT CHANGE UP (ref 3.5–5.3)
POTASSIUM SERPL-SCNC: 3.8 MMOL/L — SIGNIFICANT CHANGE UP (ref 3.5–5.3)
PROT SERPL-MCNC: 6.1 G/DL — LOW (ref 6.6–8.7)
RBC # BLD: 3.78 M/UL — LOW (ref 4.2–5.8)
RBC # FLD: 14.6 % — HIGH (ref 10.3–14.5)
SARS-COV-2 IGG+IGM SERPL QL IA: 117 U/ML — HIGH
SARS-COV-2 IGG+IGM SERPL QL IA: POSITIVE
SODIUM SERPL-SCNC: 144 MMOL/L — SIGNIFICANT CHANGE UP (ref 135–145)
WBC # BLD: 8.85 K/UL — SIGNIFICANT CHANGE UP (ref 3.8–10.5)
WBC # FLD AUTO: 8.85 K/UL — SIGNIFICANT CHANGE UP (ref 3.8–10.5)

## 2021-09-28 PROCEDURE — 99233 SBSQ HOSP IP/OBS HIGH 50: CPT

## 2021-09-28 RX ORDER — METOPROLOL TARTRATE 50 MG
5 TABLET ORAL EVERY 6 HOURS
Refills: 0 | Status: DISCONTINUED | OUTPATIENT
Start: 2021-09-28 | End: 2021-09-30

## 2021-09-28 RX ORDER — METOPROLOL TARTRATE 50 MG
10 TABLET ORAL EVERY 8 HOURS
Refills: 0 | Status: DISCONTINUED | OUTPATIENT
Start: 2021-09-28 | End: 2021-09-28

## 2021-09-28 RX ORDER — METOPROLOL TARTRATE 50 MG
5 TABLET ORAL EVERY 8 HOURS
Refills: 0 | Status: DISCONTINUED | OUTPATIENT
Start: 2021-09-28 | End: 2021-09-28

## 2021-09-28 RX ORDER — METOPROLOL TARTRATE 50 MG
5 TABLET ORAL EVERY 6 HOURS
Refills: 0 | Status: DISCONTINUED | OUTPATIENT
Start: 2021-09-28 | End: 2021-09-28

## 2021-09-28 RX ORDER — SODIUM CHLORIDE 9 MG/ML
1000 INJECTION, SOLUTION INTRAVENOUS
Refills: 0 | Status: DISCONTINUED | OUTPATIENT
Start: 2021-09-28 | End: 2021-09-29

## 2021-09-28 RX ADMIN — Medication 5 MILLIGRAM(S): at 18:10

## 2021-09-28 RX ADMIN — Medication 5 MILLIGRAM(S): at 12:18

## 2021-09-28 RX ADMIN — HEPARIN SODIUM 5000 UNIT(S): 5000 INJECTION INTRAVENOUS; SUBCUTANEOUS at 12:18

## 2021-09-28 RX ADMIN — SODIUM CHLORIDE 75 MILLILITER(S): 9 INJECTION, SOLUTION INTRAVENOUS at 04:15

## 2021-09-28 RX ADMIN — Medication 5 MILLIGRAM(S): at 23:47

## 2021-09-28 RX ADMIN — Medication 5 MILLIGRAM(S): at 04:15

## 2021-09-28 RX ADMIN — SODIUM CHLORIDE 75 MILLILITER(S): 9 INJECTION, SOLUTION INTRAVENOUS at 12:18

## 2021-09-28 NOTE — PHYSICAL THERAPY INITIAL EVALUATION ADULT - ADDITIONAL COMMENTS
Pt lives with son in a house with 3 ANDREEA with b/l HR. Pt has home health aides from 8am-8pm and then son takes over at night. Pt uses a RW and always has assistance. Has a hospital bed, RW, shower chair, commode, w/c.

## 2021-09-28 NOTE — PROGRESS NOTE ADULT - PROBLEM SELECTOR PLAN 1
-febrile, mild leukocytosis, lactate 2.8  -CXR no significant finding for infiltrate or consolidation(unofficial read), UA negative  -RVP +RSV  -mild hypoxia requiring 2L NC, titrated off as tolerated   -Pending cultures   - Appreciate ID recs- will supportive care - Patient was HTN 150s-180s overnight.   - discontinued Metoprolol 25mg bid given failed S/S.   - Switched to lopressor 5 mg Q8H and lopressor

## 2021-09-28 NOTE — PROGRESS NOTE ADULT - ASSESSMENT
89yo M with PMHx of Anemia, Aortic stenosis, BPH, Depression, Endocarditis 2013, Fatigue, GI bleed, HLD, Macular degeneration, and Narcolepsy presents to ED c/o fever.

## 2021-09-28 NOTE — PROGRESS NOTE ADULT - PROBLEM SELECTOR PLAN 2
- Patient eats regular diet at home  - aspiration precautions, keep HOB> 30  - Patient w/ failed S/S evaluation. Will keep NPO and S/S to reassess tomorrow 9/28. - Patient eats regular diet at home. Per GOC discussion, family ok with PEG tube  - aspiration precautions, keep HOB> 30  - Patient w/ failed S/S evaluation. Will keep NPO and S/S to reassess today on 9/28.

## 2021-09-28 NOTE — PROGRESS NOTE ADULT - PROBLEM SELECTOR PLAN 3
- discontinued Metoprolol 25mg bid. Started lopressor 12.5 mg Q12H given NPO -febrile, mild leukocytosis, lactate 2.8  -CXR no significant finding for infiltrate or consolidation(unofficial read), UA negative  -RVP +RSV  -mild hypoxia requiring 2L NC, titrated off as tolerated   -Pending cultures   - Appreciate ID recs- will supportive care

## 2021-09-28 NOTE — PROGRESS NOTE ADULT - PROBLEM SELECTOR PLAN 6
- DVT ppx- heparin ppx  - Diet- NPO given recent S/S evaluation  - Dispo- pending clinical improvement. F/u PT recs    - Updated patient's son, Dave ([facility])  [phone] on 9/27  - FULL CODE    Patient is actively acute, no plan for discharge at this time pending clinical course. Patient is at high risk at the moment due to fever 2/2 RSV requiring NC. - DVT ppx- heparin ppx  - Diet- NPO given recent S/S evaluation  - Dispo- pending clinical improvement. F/u PT recs    - Updated patient's son, Dave ([facility])  [phone] on 9/28  - FULL CODE    Patient is actively acute, no plan for discharge at this time pending clinical course. Patient is at high risk at the moment due to fever 2/2 RSV and pending dysphagia evaluation.

## 2021-09-29 LAB
HCT VFR BLD CALC: 33.6 % — LOW (ref 39–50)
HGB BLD-MCNC: 10.8 G/DL — LOW (ref 13–17)
LDH SERPL L TO P-CCNC: 263 U/L — HIGH (ref 98–192)
MCHC RBC-ENTMCNC: 29.3 PG — SIGNIFICANT CHANGE UP (ref 27–34)
MCHC RBC-ENTMCNC: 32.1 GM/DL — SIGNIFICANT CHANGE UP (ref 32–36)
MCV RBC AUTO: 91.3 FL — SIGNIFICANT CHANGE UP (ref 80–100)
PLATELET # BLD AUTO: 143 K/UL — LOW (ref 150–400)
RBC # BLD: 3.68 M/UL — LOW (ref 4.2–5.8)
RBC # FLD: 14.5 % — SIGNIFICANT CHANGE UP (ref 10.3–14.5)
WBC # BLD: 6.95 K/UL — SIGNIFICANT CHANGE UP (ref 3.8–10.5)
WBC # FLD AUTO: 6.95 K/UL — SIGNIFICANT CHANGE UP (ref 3.8–10.5)

## 2021-09-29 PROCEDURE — 99222 1ST HOSP IP/OBS MODERATE 55: CPT

## 2021-09-29 PROCEDURE — 99233 SBSQ HOSP IP/OBS HIGH 50: CPT

## 2021-09-29 PROCEDURE — 99497 ADVNCD CARE PLAN 30 MIN: CPT | Mod: 25

## 2021-09-29 RX ORDER — SODIUM CHLORIDE 9 MG/ML
1000 INJECTION, SOLUTION INTRAVENOUS
Refills: 0 | Status: DISCONTINUED | OUTPATIENT
Start: 2021-09-29 | End: 2021-10-01

## 2021-09-29 RX ADMIN — HEPARIN SODIUM 5000 UNIT(S): 5000 INJECTION INTRAVENOUS; SUBCUTANEOUS at 17:36

## 2021-09-29 RX ADMIN — HEPARIN SODIUM 5000 UNIT(S): 5000 INJECTION INTRAVENOUS; SUBCUTANEOUS at 05:50

## 2021-09-29 RX ADMIN — Medication 5 MILLIGRAM(S): at 17:36

## 2021-09-29 RX ADMIN — Medication 5 MILLIGRAM(S): at 05:50

## 2021-09-29 NOTE — PROGRESS NOTE ADULT - PROBLEM SELECTOR PLAN 3
-febrile, mild leukocytosis, lactate 2.8  -CXR no significant finding for infiltrate or consolidation(unofficial read), UA negative  -RVP +RSV  -hypoxia requiring 3L NC, titrated off as tolerated   -Pending cultures   - Appreciate ID recs- will supportive care

## 2021-09-29 NOTE — CONSULT NOTE ADULT - SUBJECTIVE AND OBJECTIVE BOX
Palliative Care Consult  HPI This is a 89 year old male PMHx Aortic stenosis, endocarditis, dementia, arrived to [facility] for nasal congestion and cough. Patient with associated symptoms of fever and weakness, took home rapid covid test which was reported negative. Pt also vaccinated for covid with pfizer vaccine. In ED patient with fever, elevated lactate, leukocytosis. Started on IV abx in ED for suspected PNA, RVP came back positive for RSV, Covid PCR negative. No reports of chest pain, sob, palpitations, abd pain, n/v/d/c noted. Patient admitted for acute viral illness, RSV. Called for palliative care consult to discuss GOC as patient was previously DNR/DNI and HCP/SON rescinded.     <HPI:  89yo M with PMHx of Anemia, Aortic stenosis, BPH, Depression, Endocarditis 2013, Fatigue, GI bleed, HLD, Macular degeneration, and Narcolepsy presents to ED c/o fever. Pt poor historian but states that he had nasal congestion and dry cough x 1 day. As per chart pt had home rapid COVID test that was negative. Pt was c/o worsening fever and weakness brought to ED for further evaluation. Patient is fully vaccinated for COVID with Pfizer. In the ED febrile Tmax 102.9, lactate 2.8, mild leukocytosis, cxr no significant finding for infiltrate or consolidation(unofficial read), UA negative, pt s/p 1L NS, ceftriaxone and Zithromax for suspected pneumonia in the ED. RVP came back positive for RSV, COVID PCR neg. currently pt denies cp, sob palpitations. Pt was placed on 2L nc for mild hypoxia. (27 Sep 2021 01:24)>end of copied text     PERTINENT PMH REVIEWED: Yes     PAST MEDICAL & SURGICAL HISTORY:  Fatigue    BPH (benign prostatic hyperplasia)    Endocarditis  2013    Anemia    Depression    HLD (hyperlipidemia)    GI bleed    Aortic stenosis    Narcolepsy    UTI (urinary tract infection)  2013    Macular degeneration    S/P cataract extraction and insertion of intraocular lens  bilat    S/P TAVR (transcatheter aortic valve replacement)      SOCIAL HISTORY:                      Substance history: none                    Admitted from:  home                      Restoration/spirituality: Sikh                    Cultural concerns:none                      Surrogate/HCP/Guardian: Phone#: Raimundo Braxtonillo [phone]    FAMILY HISTORY:  No family history related to admission diagonsis    Allergies  No Known Allergies    ADVANCE DIRECTIVES/TREATMENT PREFERENCES:  Full Code    Baseline ADLs (prior to admission):  Dependent      Karnofsky/Palliative Performance Status Version 2:  %30  http://npcrc.org/files/[email or web address]_[email or web address]    Present Symptoms:     Dyspnea: Mild   Nausea/Vomiting:Unable to obtain due to poor mentation   Anxiety:  Unable to obtain due to poor mentation   Depression: Unable to obtain due to poor mentation   Fatigue: Yes   Loss of appetite: Unable to obtain due to poor mentation   Constipation: Unable to obtain due to poor mentation     Pain: Appears comfortable            Character-            Duration-            Effect-            Factors-            Frequency-            Location-            Severity-    Pain AD Score:  [email or web address]/[email or web address] (press ctrl + left click to view)    Review of Systems: Reviewed  Unable to obtain due to poor mentation   All others negative    MEDICATIONS  (STANDING):  escitalopram 20 milliGRAM(s) Oral daily  heparin   Injectable 5000 Unit(s) SubCutaneous every 12 hours  lactated ringers. 1000 milliLiter(s) (75 mL/Hr) IV Continuous <Continuous>  memantine 10 milliGRAM(s) Oral daily  metoprolol tartrate Injectable 5 milliGRAM(s) IV Push every 6 hours  mirtazapine 7.5 milliGRAM(s) Oral at bedtime  tamsulosin 0.8 milliGRAM(s) Oral at bedtime    MEDICATIONS  (PRN):  acetaminophen  Suppository .. 650 milliGRAM(s) Rectal every 6 hours PRN Temp greater or equal to 38C (100.4F), Mild Pain (1 - 3)  aluminum hydroxide/magnesium hydroxide/simethicone Suspension 30 milliLiter(s) Oral every 4 hours PRN Dyspepsia  melatonin 3 milliGRAM(s) Oral at bedtime PRN Insomnia  ondansetron Injectable 4 milliGRAM(s) IV Push every 8 hours PRN Nausea and/or Vomiting      PHYSICAL EXAM:    Vital Signs Last 24 Hrs  T(C): 37.2 (29 Sep 2021 07:29), Max: 38.3 (28 Sep 2021 22:15)  T(F): 98.9 (29 Sep 2021 07:29), Max: 101 (28 Sep 2021 22:15)  HR: 63 (29 Sep 2021 07:29) (63 - 78)  BP: 142/61 (29 Sep 2021 07:29) (141/72 - 188/72)  BP(mean): --  RR: 18 (29 Sep 2021 07:29) (17 - 18)  SpO2: 100% (29 Sep 2021 07:29) (94% - 100%)    General: lethargic delirious    HEENT: normal     Lungs: comfortable     CV: normal      GI: incontinent    : incontinent      MSK: weakness      Neuro: cognitive impairment dysphagia    Skin: thin frail skin    LABS:                        10.8   6.95  )-----------( 143      ( 29 Sep 2021 07:45 )             33.6     09-28    144  |  106  |  20.6<H>  ----------------------------<  121<H>  3.8   |  23.0  |  0.87    Ca    8.3<L>      28 Sep 2021 04:23    TPro  6.1<L>  /  Alb  3.6  /  TBili  0.5  /  DBili  x   /  AST  34  /  ALT  13  /  AlkPhos  60  09-28    I&O's Summary    28 Sep 2021 07:01  -  29 Sep 2021 07:00  --------------------------------------------------------  IN: 637.5 mL / OUT: 0 mL / NET: 637.5 mL    RADIOLOGY & ADDITIONAL STUDIES:  CXR 09/26/21  IMPRESSION: Grossly clear lungs at this time.

## 2021-09-29 NOTE — PROGRESS NOTE ADULT - PROBLEM SELECTOR PLAN 1
- Stable  - discontinued Metoprolol 25mg bid. Will resume once tolerates dysphagia 1 diet  - Switched to lopressor 5 mg Q6H

## 2021-09-29 NOTE — CONSULT NOTE ADULT - ASSESSMENT
89 year old male with history of dementia, aortic stenosis, endocarditis, admitted with acute viral infection, RSV    Problem/Recommendation 1:Fever  COVID negative  + RSV   Monitor for fevers, tylenol PRN     Problem/Recommendation 2: Dysphagia  Speech/swallow re-eval for dysphagia  Per hospitalist, family on board with PEG if needed    Problem/Recommendation 3: Debility  Assist in ADLS  Maintain safety, fall, aspiration precautions    Problem/Recommendation 4: Palliative Care Encounter  Discussed case with Dr. Gonzalez   Call placed to patient's son, Raimundo. Discussed overall GOC and his father's baseline prior to admission at [facility]. Raimundo opened up to me about his father's diagnosis of Ahlzeimers Dementia which he was diagnosed with several years ago ~5 years ago. At home patient was living with Raimundo in a mother daughter house which he had private aides tend to him 12 hours a day. Raimundo explained at baseline his father's dementia he would consider it to be "moderate" he had a great appetite, no signs / symptoms of dysphagia per Raimundo while patient was at home. Patient is , his wife passed 11 years prior. Raimundo explains he's the only adult child and father's HCP. Without a HCP form Raimundo would technically be considered the Surrogate.   Discussed code status at length. Explained the difference between Do Not Resuscitate/Do Not Intubate versus CPR and Intubation. At the end of our conversation decision was made for CPR and Intubation (FULL CODE)  Raimundo went on to explain if his father was to sustain cardiopulmonary arrest, he would "want a round of CPR/I to see if he can make it" but if he becomes dependent on the ventilator, he would compassionately take him off.   Overall, Raimundo was asking alot of questions regarding Dementia, Dysphagia, and his current course. Answered all questions related to Palliative.   Discussed Dysphagia - steps for NGT , swallow evals, and potential events of PEG vs Comfort feeds. Explained in detail comfort feeds for the patient as an alternative to PEG placement. Explained comfort feeds can lead to events of aspiration pneumonia as well. At this time Raimundo wants to move forward with swallow evaluations to see if his father can tolerate it better as he becomes more awake and alert. Raimundo remains hopeful in his father's recovery.     Total Time Spent___55_ minutes  This includes chart review, patient assessment, discussion and collaboration with interdisciplinary team members, ACP planning    COUNSELING:  Isolation precautions PHONE meeting to discuss Advanced Care Planning - Time Spent ___30___Minutes.      Thank you for the opportunity to assist with the care of this patient.   [facility] Palliative Medicine Consult Service [phone].

## 2021-09-29 NOTE — PROGRESS NOTE ADULT - PROBLEM SELECTOR PLAN 2
- Patient eats regular diet at home. Per GOC discussion, family ok with PEG tube  - aspiration precautions, keep HOB> 30  - Appreciate S/S evaluation- started dysphagia 1 with honey fluids

## 2021-09-29 NOTE — PROGRESS NOTE ADULT - ASSESSMENT
89yo M with PMHx of Anemia, Aortic stenosis, BPH, Depression, Endocarditis 2013, Fatigue, GI bleed, HLD, Macular degeneration, and Narcolepsy presents to ED c/o fever 2/2 RSV. C/b dysphagia with multiple failed S/S evaluation. Palliative c/s placed.

## 2021-09-29 NOTE — PROGRESS NOTE ADULT - PROBLEM SELECTOR PLAN 6
- DVT ppx- heparin ppx  - Diet- Puree w/ honey thick liquid  - Dispo- pending clinical improvement. F/u PT recs- home with HHA and home PT    - Updated patient's son, Dave ([facility])  [phone] on 9/29  - Palliative discussion (9/29)- FULL CODE    Patient is actively acute, no plan for discharge at this time pending clinical course. Patient is at high risk at the moment due to fever 2/2 RSV and pending dysphagia evaluation.

## 2021-09-30 LAB
ALBUMIN SERPL ELPH-MCNC: 3.2 G/DL — LOW (ref 3.3–5.2)
ALP SERPL-CCNC: 61 U/L — SIGNIFICANT CHANGE UP (ref 40–120)
ALT FLD-CCNC: 22 U/L — SIGNIFICANT CHANGE UP
ANION GAP SERPL CALC-SCNC: 14 MMOL/L — SIGNIFICANT CHANGE UP (ref 5–17)
AST SERPL-CCNC: 100 U/L — HIGH
BILIRUB SERPL-MCNC: 0.4 MG/DL — SIGNIFICANT CHANGE UP (ref 0.4–2)
BUN SERPL-MCNC: 19.4 MG/DL — SIGNIFICANT CHANGE UP (ref 8–20)
CALCIUM SERPL-MCNC: 8.5 MG/DL — LOW (ref 8.6–10.2)
CHLORIDE SERPL-SCNC: 105 MMOL/L — SIGNIFICANT CHANGE UP (ref 98–107)
CO2 SERPL-SCNC: 26 MMOL/L — SIGNIFICANT CHANGE UP (ref 22–29)
CREAT SERPL-MCNC: 0.74 MG/DL — SIGNIFICANT CHANGE UP (ref 0.5–1.3)
CULTURE RESULTS: SIGNIFICANT CHANGE UP
GLUCOSE SERPL-MCNC: 108 MG/DL — HIGH (ref 70–99)
HCT VFR BLD CALC: 35.7 % — LOW (ref 39–50)
HGB BLD-MCNC: 11.5 G/DL — LOW (ref 13–17)
MCHC RBC-ENTMCNC: 29.3 PG — SIGNIFICANT CHANGE UP (ref 27–34)
MCHC RBC-ENTMCNC: 32.2 GM/DL — SIGNIFICANT CHANGE UP (ref 32–36)
MCV RBC AUTO: 90.8 FL — SIGNIFICANT CHANGE UP (ref 80–100)
PLATELET # BLD AUTO: 146 K/UL — LOW (ref 150–400)
POTASSIUM SERPL-MCNC: 3.4 MMOL/L — LOW (ref 3.5–5.3)
POTASSIUM SERPL-SCNC: 3.4 MMOL/L — LOW (ref 3.5–5.3)
PROT SERPL-MCNC: 6.4 G/DL — LOW (ref 6.6–8.7)
RBC # BLD: 3.93 M/UL — LOW (ref 4.2–5.8)
RBC # FLD: 14.2 % — SIGNIFICANT CHANGE UP (ref 10.3–14.5)
SODIUM SERPL-SCNC: 145 MMOL/L — SIGNIFICANT CHANGE UP (ref 135–145)
SPECIMEN SOURCE: SIGNIFICANT CHANGE UP
WBC # BLD: 7.66 K/UL — SIGNIFICANT CHANGE UP (ref 3.8–10.5)
WBC # FLD AUTO: 7.66 K/UL — SIGNIFICANT CHANGE UP (ref 3.8–10.5)

## 2021-09-30 PROCEDURE — 99233 SBSQ HOSP IP/OBS HIGH 50: CPT

## 2021-09-30 PROCEDURE — 99232 SBSQ HOSP IP/OBS MODERATE 35: CPT

## 2021-09-30 RX ORDER — HYDRALAZINE HCL 50 MG
10 TABLET ORAL ONCE
Refills: 0 | Status: COMPLETED | OUTPATIENT
Start: 2021-09-30 | End: 2021-09-30

## 2021-09-30 RX ORDER — METOPROLOL TARTRATE 50 MG
10 TABLET ORAL EVERY 6 HOURS
Refills: 0 | Status: DISCONTINUED | OUTPATIENT
Start: 2021-09-30 | End: 2021-10-01

## 2021-09-30 RX ORDER — POTASSIUM CHLORIDE 20 MEQ
40 PACKET (EA) ORAL ONCE
Refills: 0 | Status: COMPLETED | OUTPATIENT
Start: 2021-09-30 | End: 2021-09-30

## 2021-09-30 RX ORDER — METOPROLOL TARTRATE 50 MG
25 TABLET ORAL
Refills: 0 | Status: DISCONTINUED | OUTPATIENT
Start: 2021-09-30 | End: 2021-10-01

## 2021-09-30 RX ORDER — METOPROLOL TARTRATE 50 MG
10 TABLET ORAL EVERY 6 HOURS
Refills: 0 | Status: DISCONTINUED | OUTPATIENT
Start: 2021-09-30 | End: 2021-09-30

## 2021-09-30 RX ADMIN — ESCITALOPRAM OXALATE 20 MILLIGRAM(S): 10 TABLET, FILM COATED ORAL at 10:21

## 2021-09-30 RX ADMIN — HEPARIN SODIUM 5000 UNIT(S): 5000 INJECTION INTRAVENOUS; SUBCUTANEOUS at 17:43

## 2021-09-30 RX ADMIN — MIRTAZAPINE 7.5 MILLIGRAM(S): 45 TABLET, ORALLY DISINTEGRATING ORAL at 22:45

## 2021-09-30 RX ADMIN — HEPARIN SODIUM 5000 UNIT(S): 5000 INJECTION INTRAVENOUS; SUBCUTANEOUS at 05:19

## 2021-09-30 RX ADMIN — TAMSULOSIN HYDROCHLORIDE 0.8 MILLIGRAM(S): 0.4 CAPSULE ORAL at 22:45

## 2021-09-30 RX ADMIN — Medication 5 MILLIGRAM(S): at 05:49

## 2021-09-30 RX ADMIN — Medication 25 MILLIGRAM(S): at 17:43

## 2021-09-30 RX ADMIN — Medication 10 MILLIGRAM(S): at 10:21

## 2021-09-30 RX ADMIN — Medication 10 MILLIGRAM(S): at 05:19

## 2021-09-30 RX ADMIN — Medication 5 MILLIGRAM(S): at 02:00

## 2021-09-30 RX ADMIN — MEMANTINE HYDROCHLORIDE 10 MILLIGRAM(S): 10 TABLET ORAL at 10:21

## 2021-09-30 RX ADMIN — Medication 40 MILLIEQUIVALENT(S): at 10:19

## 2021-09-30 NOTE — CHART NOTE - NSCHARTNOTEFT_GEN_A_CORE
Pt with BP this am 193/79, HR 59. Resting comfortably in NAD. Denies chest pain, SOB, headaches, dizziness, abdominal pain, n/v/d/c, blurry vision. All remainder of VSS. 10mg hydralazine IV given at this time. RN to notify of any acute change in pt status.

## 2021-09-30 NOTE — PROGRESS NOTE ADULT - PROBLEM SELECTOR PLAN 2
- Patient eats regular diet at home. Per GOC discussion, family ok with PEG tube  - aspiration precautions, keep HOB> 30  - Appreciate S/S evaluation- advanced to dysphagia 2 with nectar fluids - Patient eats regular diet at home. Per GOC discussion, family ok with PEG tube  - aspiration precautions, keep HOB> 30  - Appreciate S/S evaluation- advanced to dysphagia 2 with nectar fluids 99/30). Will reassess tomorrow - Patient eats regular diet at home. Per GOC discussion, family ok with PEG tube  - aspiration precautions, keep HOB> 30  - Appreciate S/S evaluation- advanced to dysphagia 2 with nectar fluids 99/30). S/S to reassess tomorrow

## 2021-09-30 NOTE — PROGRESS NOTE ADULT - PROBLEM SELECTOR PLAN 6
- DVT ppx- heparin ppx  - Diet- Advanced to dysphagia 2 nectar  - Dispo- pending clinical improvement. F/u PT recs- home with HHA and home PT    - Updated patient's son, Dave ([facility])  [phone] on 9/29  - Palliative discussion (9/29)- FULL CODE    Patient is actively acute, no plan for discharge at this time pending clinical course. Patient is at high risk at the moment due to pending ongoing dysphagia evaluation. - DVT ppx- heparin ppx  - Diet- Advanced to dysphagia 2 nectar  - Dispo- pending clinical improvement and obtain overnight oximetry study. F/u PT recs- home with HHA and home PT    - Updated patient's son, Dave ([facility])  [phone] on 9/29  - Palliative discussion (9/29)- FULL CODE    Patient is actively acute, no plan for discharge at this time pending clinical course. Patient is at high risk at the moment due to pending ongoing dysphagia evaluation. - DVT ppx- heparin ppx  - Diet- Advanced to dysphagia 2 nectar  - Dispo- pending clinical improvement and obtain overnight oximetry study. F/u PT recs- home with HHA and home PT    - Updated patient's son, Dave ([facility])  [phone] on 9/30  - Palliative discussion (9/29)- FULL CODE    Patient is actively acute, no plan for discharge at this time pending clinical course. Patient is at high risk at the moment due to pending ongoing dysphagia evaluation and overnight oximetry study.

## 2021-09-30 NOTE — PROGRESS NOTE ADULT - PROBLEM SELECTOR PLAN 1
- Stable  - discontinued Metoprolol 25mg bid. Will resume once tolerates dysphagia 1 diet  - Switched to lopressor 5 mg Q6H - HTN overnight  - c/w Metoprolol 25mg bid and started lopressor 10 mg Q6H w/ hold parameters

## 2021-09-30 NOTE — PROGRESS NOTE ADULT - ASSESSMENT
89yo M with PMHx of Anemia, Aortic stenosis, BPH, Depression, Endocarditis 2013, Fatigue, GI bleed, HLD, Macular degeneration, and Narcolepsy presents to ED c/o fever 2/2 RSV. C/b dysphagia with multiple failed S/S evaluation. Palliative c/s placed.  89yo M with PMHx of Anemia, Aortic stenosis, BPH, Depression, Endocarditis 2013, Fatigue, GI bleed, HLD, Macular degeneration, and Narcolepsy presents to ED c/o fever 2/2 RSV. C/b dysphagia with multiple failed S/S evaluation.

## 2021-09-30 NOTE — PROGRESS NOTE ADULT - ASSESSMENT
89 year old male with history of dementia, aortic stenosis, endocarditis, admitted with acute viral infection, RSV    Problem/Recommendation 1:Fever  COVID negative  + RSV   Monitor for fevers, tylenol PRN     Problem/Recommendation 2: Dysphagia  Speech/swallow following, diet slowly advanced    Problem/Recommendation 3: Debility  Assist in ADLS  Maintain safety, fall, aspiration precautions    Problem/Recommendation 4: Palliative Care Encounter  Patient awake, alert, in the chair today appears comfortable  Spoke to SLP, patient tolerated swallow eval today slowly advanced diet but still needs to have f/u re-eval tomorrow to see how he tolerates liquids  Spoke to son on encounter, patient to remain Full Code at this time of hospitalization  Patient has private aides at home 12 hours a day and lives with the son  Palliative care to continue to follow    Total Time Spent___30_ minutes  This includes chart review, patient assessment, discussion and collaboration with interdisciplinary team members, ACP planning    Thank you for the opportunity to assist with the care of this patient.   [facility] Palliative Medicine Consult Service [phone].

## 2021-09-30 NOTE — PROGRESS NOTE ADULT - PROBLEM SELECTOR PLAN 3
-febrile, mild leukocytosis, lactate 2.8  -CXR no significant finding for infiltrate or consolidation(unofficial read), UA negative  -RVP +RSV  -hypoxia requiring 3L NC, titrated off as tolerated   -Pending cultures   - Appreciate ID recs- will supportive care -febrile, mild leukocytosis, lactate 2.8  -CXR no significant finding for infiltrate or consolidation(unofficial read), UA negative  -RVP +RSV  -hypoxia requiring 3L NC, titrated off as tolerated   -Pending cultures   - Appreciate ID recs- will supportive care  - Per nursing report, patient has desatted overnight and requires NC. Will obtain overnight oximetry study. -febrile, mild leukocytosis, lactate 2.8  -CXR no significant finding for infiltrate or consolidation(unofficial read), UA negative  -RVP +RSV  -hypoxia requiring 3L NC, titrated off as tolerated   -Pending cultures   - Appreciate ID recs- will supportive care  - Per nursing report, patient has desatted overnight and required NC. Will obtain overnight oximetry study.

## 2021-09-30 NOTE — PROGRESS NOTE ADULT - PROBLEM SELECTOR PLAN 4
- Per son, at baseline he has Alzheimer's Disease and has difficulty hearing. Alert and oriented. Has HHA 12 hours for 7 days a week.   - cont with memantine, mirtazapine and Lexapro - Per son, at baseline he has Alzheimer's Disease and has difficulty hearing. Alert and oriented. Has HHA 12 hours for 7 days a week.   - cont with memantine, mirtazapine and Lexapro  - Patient not on home oxygen and no chronic lung conditions

## 2021-10-01 ENCOUNTER — TRANSCRIPTION ENCOUNTER (OUTPATIENT)
Age: 86
End: 2021-10-01

## 2021-10-01 VITALS
HEART RATE: 73 BPM | OXYGEN SATURATION: 95 % | DIASTOLIC BLOOD PRESSURE: 67 MMHG | TEMPERATURE: 99 F | SYSTOLIC BLOOD PRESSURE: 146 MMHG | RESPIRATION RATE: 19 BRPM

## 2021-10-01 LAB
ALBUMIN SERPL ELPH-MCNC: 3 G/DL — LOW (ref 3.3–5.2)
ALP SERPL-CCNC: 56 U/L — SIGNIFICANT CHANGE UP (ref 40–120)
ALT FLD-CCNC: 21 U/L — SIGNIFICANT CHANGE UP
ANION GAP SERPL CALC-SCNC: 12 MMOL/L — SIGNIFICANT CHANGE UP (ref 5–17)
AST SERPL-CCNC: 59 U/L — HIGH
BILIRUB SERPL-MCNC: 0.4 MG/DL — SIGNIFICANT CHANGE UP (ref 0.4–2)
BUN SERPL-MCNC: 20.9 MG/DL — HIGH (ref 8–20)
CALCIUM SERPL-MCNC: 8.5 MG/DL — LOW (ref 8.6–10.2)
CHLORIDE SERPL-SCNC: 106 MMOL/L — SIGNIFICANT CHANGE UP (ref 98–107)
CO2 SERPL-SCNC: 25 MMOL/L — SIGNIFICANT CHANGE UP (ref 22–29)
CREAT SERPL-MCNC: 0.75 MG/DL — SIGNIFICANT CHANGE UP (ref 0.5–1.3)
CULTURE RESULTS: SIGNIFICANT CHANGE UP
CULTURE RESULTS: SIGNIFICANT CHANGE UP
GLUCOSE SERPL-MCNC: 138 MG/DL — HIGH (ref 70–99)
HCT VFR BLD CALC: 33.8 % — LOW (ref 39–50)
HGB BLD-MCNC: 11 G/DL — LOW (ref 13–17)
MCHC RBC-ENTMCNC: 29.2 PG — SIGNIFICANT CHANGE UP (ref 27–34)
MCHC RBC-ENTMCNC: 32.5 GM/DL — SIGNIFICANT CHANGE UP (ref 32–36)
MCV RBC AUTO: 89.7 FL — SIGNIFICANT CHANGE UP (ref 80–100)
PLATELET # BLD AUTO: 157 K/UL — SIGNIFICANT CHANGE UP (ref 150–400)
POTASSIUM SERPL-MCNC: 3.3 MMOL/L — LOW (ref 3.5–5.3)
POTASSIUM SERPL-SCNC: 3.3 MMOL/L — LOW (ref 3.5–5.3)
PROT SERPL-MCNC: 5.9 G/DL — LOW (ref 6.6–8.7)
RBC # BLD: 3.77 M/UL — LOW (ref 4.2–5.8)
RBC # FLD: 14.2 % — SIGNIFICANT CHANGE UP (ref 10.3–14.5)
SODIUM SERPL-SCNC: 143 MMOL/L — SIGNIFICANT CHANGE UP (ref 135–145)
SPECIMEN SOURCE: SIGNIFICANT CHANGE UP
SPECIMEN SOURCE: SIGNIFICANT CHANGE UP
WBC # BLD: 5.74 K/UL — SIGNIFICANT CHANGE UP (ref 3.8–10.5)
WBC # FLD AUTO: 5.74 K/UL — SIGNIFICANT CHANGE UP (ref 3.8–10.5)

## 2021-10-01 PROCEDURE — 99239 HOSP IP/OBS DSCHRG MGMT >30: CPT

## 2021-10-01 PROCEDURE — 99232 SBSQ HOSP IP/OBS MODERATE 35: CPT

## 2021-10-01 RX ORDER — POTASSIUM CHLORIDE 20 MEQ
40 PACKET (EA) ORAL ONCE
Refills: 0 | Status: COMPLETED | OUTPATIENT
Start: 2021-10-01 | End: 2021-10-01

## 2021-10-01 RX ADMIN — HEPARIN SODIUM 5000 UNIT(S): 5000 INJECTION INTRAVENOUS; SUBCUTANEOUS at 05:22

## 2021-10-01 RX ADMIN — Medication 40 MILLIEQUIVALENT(S): at 09:19

## 2021-10-01 RX ADMIN — Medication 25 MILLIGRAM(S): at 05:22

## 2021-10-01 RX ADMIN — MEMANTINE HYDROCHLORIDE 10 MILLIGRAM(S): 10 TABLET ORAL at 09:21

## 2021-10-01 RX ADMIN — ESCITALOPRAM OXALATE 20 MILLIGRAM(S): 10 TABLET, FILM COATED ORAL at 09:21

## 2021-10-01 NOTE — PROGRESS NOTE ADULT - REASON FOR ADMISSION
acute viral illness, +RSV

## 2021-10-01 NOTE — DISCHARGE NOTE PROVIDER - NSDCFUADDAPPT_GEN_ALL_CORE_FT
IT IS VERY IMPORTANT THAT YOU KEEP ALL APPOINTMENTS  CARDIO - COLTEN  YOU ARE OPEN TO MEDS TO BED BUT PREFERS TO KEEP [facility] FOR LT RX NEEDS. YOU DENY THE NEED FOR RX INTERVENTION FOR TEACHING AND STATE YOU CAN AFFORD ALL MEDS.

## 2021-10-01 NOTE — DISCHARGE NOTE PROVIDER - HOSPITAL COURSE
89yo M with PMHx of Anemia, Aortic stenosis, BPH, Depression, Endocarditis 2013, Fatigue, GI bleed, HLD, Macular degeneration, and Narcolepsy presents to ED c/o fever 2/2 RSV. Patient was on supportive care with oxygen. He was not started on antibiotics. His hospital course was complicated by dysphagia. Patient had a speech and swallow evaluation and his diet was advanced to dysphagia 3 with thin liquids. Palliative team was consulted and patient has been FULL CODE. Overnight oximetry study was normal. Patient is medically optimized for discharge to home and will need outpatient follow up with PCP.     Vital Signs Last 24 Hrs  T(F): 98.6 (01 Oct 2021 08:09), Max: 99.1 (30 Sep 2021 12:26)  HR: 73 (01 Oct 2021 08:09) (73 - 98)  BP: 146/67 (01 Oct 2021 08:09) (146/67 - 167/74)  RR: 19 (01 Oct 2021 08:09) (18 - 19)  SpO2: 95% (01 Oct 2021 08:09) (91% - 97%)    Physical Exam:  CONSTITUTIONAL: NAD, well-developed  RESPIRATORY: Normal respiratory effort; lungs are clear to auscultation bilaterally  CARDIOVASCULAR: Regular rate and rhythm, normal S1 and S2  ABDOMEN: Nontender to palpation, normoactive bowel sounds  MUSCULOSKELETAL: no clubbing or cyanosis of digits  PSYCH: A+Ox 1 person; affect appropriate  NEUROLOGY: CN 2-12 are intact and symmetric; no gross sensory deficits    Time spent on patients discharge 32 minutes

## 2021-10-01 NOTE — DISCHARGE NOTE PROVIDER - NSDCCPCAREPLAN_GEN_ALL_CORE_FT
PRINCIPAL DISCHARGE DIAGNOSIS  Diagnosis: RSV infection  Assessment and Plan of Treatment: You presented with fever and were found to have RSV. We have continued with supportive care.      SECONDARY DISCHARGE DIAGNOSES  Diagnosis: Hypertension  Assessment and Plan of Treatment: Please continue taking metoprolol and follow up with your PCP.    Diagnosis: Dysphagia  Assessment and Plan of Treatment: You had dysphagia and were evaluated by speech and swallow evaluation. Please continue taking dysphagia 3 with thin liquids.

## 2021-10-01 NOTE — PROGRESS NOTE ADULT - ASSESSMENT
89 year old male with history of dementia, aortic stenosis, endocarditis, admitted with acute viral infection, RSV    Problem/Recommendation 1:Fever  COVID negative + RSV   Monitor for fevers, tylenol PRN   Fever improved, afebrile    Problem/Recommendation 2: Dysphagia  Speech/swallow following, diet slowly advanced - tolerating well    Problem/Recommendation 3: Debility  Assist in ADLS  Maintain safety, fall, aspiration precautions    Problem/Recommendation 4: Palliative Care Encounter  Patient awake, alert, sitting up in bed today tolerating breakfast  Patient also tolerating physical therapy, spoke with PT patient is able to tolerate activity   Patient making progress with his initial issue of dysphagia, diet slowly advanced yesterday - SLP to follow up today to see how he tolerates liquids  Palliative had reached out to son to confirm goals and code status during inital encounter  Patient to remain FULL CODE at the time of this hospitalization  Discharge planning per social work/case management - likely home with home care as patient has private aide support and lives with his son    No further needs to be addressed from Palliative care perspective.  Will sign off at this time. Please reconsult if GOC change or condition decompensates requiring further palliative care assistance.  HCP/Surrogate: Raimundo Altman (son) [phone]  Code Status:   CPR/I                                                             Thank you for the opportunity to assist with the care of this patient.   [facility] Palliative Medicine Consult Service [phone].     Total Time Spent___35_ minutes  This includes chart review, patient assessment, discussion and collaboration with interdisciplinary team members, ACP planning    Thank you for the opportunity to assist with the care of this patient.   [facility] Palliative Medicine Consult Service [phone].

## 2021-10-01 NOTE — DISCHARGE NOTE PROVIDER - NSDCFUSCHEDAPPT_GEN_ALL_CORE_FT
PRASHANT MORALES ; 11/18/2021 ; [facility] [address] [facility]

## 2021-10-01 NOTE — PROGRESS NOTE ADULT - SUBJECTIVE AND OBJECTIVE BOX
[facility] Division of Hospital Medicine    Chief Complaint:  acute viral illness, +RSV    SUBJECTIVE / OVERNIGHT EVENTS:  Patient was hypertensive overnight and required iv Hydralazine 10 mg, x1. He endorses feeling tired. Patient was satting 100% on NC. I discontinued NC and he was satting >90% for more than 5 minutes. Denies SOB.     Patient denies chest pain, SOB, abd pain, N/V, fever, chills, dysuria or any other complaints. All remainder ROS negative.     MEDICATIONS  (STANDING):  escitalopram 20 milliGRAM(s) Oral daily  heparin   Injectable 5000 Unit(s) SubCutaneous every 12 hours  lactated ringers. 1000 milliLiter(s) (75 mL/Hr) IV Continuous <Continuous>  memantine 10 milliGRAM(s) Oral daily  metoprolol tartrate 25 milliGRAM(s) Oral two times a day  mirtazapine 7.5 milliGRAM(s) Oral at bedtime  tamsulosin 0.8 milliGRAM(s) Oral at bedtime    MEDICATIONS  (PRN):  acetaminophen  Suppository .. 650 milliGRAM(s) Rectal every 6 hours PRN Temp greater or equal to 38C (100.4F), Mild Pain (1 - 3)  aluminum hydroxide/magnesium hydroxide/simethicone Suspension 30 milliLiter(s) Oral every 4 hours PRN Dyspepsia  melatonin 3 milliGRAM(s) Oral at bedtime PRN Insomnia  metoprolol tartrate Injectable 10 milliGRAM(s) IV Push every 6 hours PRN for SBP> 160  ondansetron Injectable 4 milliGRAM(s) IV Push every 8 hours PRN Nausea and/or Vomiting        I&O's Summary    29 Sep 2021 07:01  -  30 Sep 2021 07:00  --------------------------------------------------------  IN: 1200 mL / OUT: 503 mL / NET: 697 mL        PHYSICAL EXAM:  Vital Signs Last 24 Hrs  T(C): 37.3 (30 Sep 2021 12:26), Max: 37.3 (30 Sep 2021 12:26)  T(F): 99.1 (30 Sep 2021 12:26), Max: 99.1 (30 Sep 2021 12:26)  HR: 98 (30 Sep 2021 12:26) (55 - 98)  BP: 167/73 (30 Sep 2021 12:26) (147/66 - 207/116)  BP(mean): 124 (30 Sep 2021 04:00) (124 - 124)  RR: 19 (30 Sep 2021 12:26) (18 - 19)  SpO2: 96% (30 Sep 2021 12:26) (95% - 100%)        CONSTITUTIONAL: NAD, well-developed  RESPIRATORY: Normal respiratory effort; lungs are clear to auscultation bilaterally  CARDIOVASCULAR: Regular rate and rhythm, normal S1 and S2  ABDOMEN: Nontender to palpation, normoactive bowel sounds  MUSCULOSKELETAL: no clubbing or cyanosis of digits  PSYCH: A+Ox 1 person; affect appropriate  NEUROLOGY: CN 2-12 are intact and symmetric; no gross sensory deficits    LABS:                        11.5   7.66  )-----------( 146      ( 30 Sep 2021 04:12 )             35.7     09-30    145  |  105  |  19.4  ----------------------------<  108<H>  3.4<L>   |  26.0  |  0.74    Ca    8.5<L>      30 Sep 2021 04:12    TPro  6.4<L>  /  Alb  3.2<L>  /  TBili  0.4  /  DBili  x   /  AST  100<H>  /  ALT  22  /  AlkPhos  61  09-30              CAPILLARY BLOOD GLUCOSE            RADIOLOGY & ADDITIONAL TESTS:  Results Reviewed:   Imaging Personally Reviewed:  Electrocardiogram Personally Reviewed:                                          
Palliative Care Followup    OVERNIGHT EVENTS: no overnight events - patient awake, alert, tolerating diet and physical therapy    Present Symptoms:   Dyspnea: none   Nausea/Vomiting: none  Anxiety:  none   Depression: none   Fatigue: awake  Loss of appetite: eating well   Constipation: none    Pain: Appears comfortable            Character-            Duration-            Effect-            Factors-            Frequency-            Location-            Severity-    Pain AD Score:  [email or web address] (press ctrl + left click to view)    Review of Systems: Reviewed  Difficult to obtain due to poor mentation   All others negative    MEDICATIONS  (STANDING):  escitalopram 20 milliGRAM(s) Oral daily  heparin   Injectable 5000 Unit(s) SubCutaneous every 12 hours  lactated ringers. 1000 milliLiter(s) (75 mL/Hr) IV Continuous <Continuous>  memantine 10 milliGRAM(s) Oral daily  metoprolol tartrate 25 milliGRAM(s) Oral two times a day  mirtazapine 7.5 milliGRAM(s) Oral at bedtime  tamsulosin 0.8 milliGRAM(s) Oral at bedtime    MEDICATIONS  (PRN):  acetaminophen  Suppository .. 650 milliGRAM(s) Rectal every 6 hours PRN Temp greater or equal to 38C (100.4F), Mild Pain (1 - 3)  aluminum hydroxide/magnesium hydroxide/simethicone Suspension 30 milliLiter(s) Oral every 4 hours PRN Dyspepsia  melatonin 3 milliGRAM(s) Oral at bedtime PRN Insomnia  metoprolol tartrate Injectable 10 milliGRAM(s) IV Push every 6 hours PRN for SBP> 160  ondansetron Injectable 4 milliGRAM(s) IV Push every 8 hours PRN Nausea and/or Vomiting    Vital Signs Last 24 Hrs  T(C): 37 (01 Oct 2021 08:09), Max: 37.3 (30 Sep 2021 12:26)  T(F): 98.6 (01 Oct 2021 08:09), Max: 99.1 (30 Sep 2021 12:26)  HR: 73 (01 Oct 2021 08:09) (73 - 98)  BP: 146/67 (01 Oct 2021 08:09) (146/67 - 167/74)  BP(mean): --  RR: 19 (01 Oct 2021 08:09) (18 - 19)  SpO2: 95% (01 Oct 2021 08:09) (91% - 97%)    General: now awake alert following commands     HEENT: normal     Lungs: comfortable     CV: normal      GI: incontinent    : incontinent      MSK: weakness      Neuro: cognitive impairment dysphagia now improving    Skin: thin frail skin    LABS:  Comprehensive Metabolic Panel in AM (10.01.21 @ 04:22)    Sodium, Serum: 143 mmol/L    Potassium, Serum: 3.3 mmol/L    Chloride, Serum: 106 mmol/L    Carbon Dioxide, Serum: 25.0 mmol/L    Anion Gap, Serum: 12 mmol/L    Blood Urea Nitrogen, Serum: 20.9 mg/dL    Creatinine, Serum: 0.75 mg/dL    Glucose, Serum: 138 mg/dL    Calcium, Total Serum: 8.5 mg/dL    Protein Total, Serum: 5.9 g/dL    Albumin, Serum: 3.0 g/dL    Bilirubin Total, Serum: 0.4 mg/dL    Alkaline Phosphatase, Serum: 56 U/L    Aspartate Aminotransferase (AST/SGOT): 59 U/L    Alanine Aminotransferase (ALT/SGPT): 21 U/L    eGFR if Non : 77: Interpretative comment  The units for eGFR are mL/min/1.73M2     RADIOLOGY & ADDITIONAL STUDIES:    CXR 09/26/21    IMPRESSION: Grossly clear lungs at this time.    ADVANCE DIRECTIVES/TREATMENT PREFERENCES:  Full Code  
Palliative Care Followup    OVERNIGHT EVENTS: patient hypertensive this AM see chart note for details  Patient awake alert in the chair today - tolerated swallow eval - diet slowly advanced - still needs to be reassessed further for liquids    Present Symptoms:     Dyspnea: improved  Nausea/Vomiting:Unable to obtain due to poor mentation   Anxiety:  Unable to obtain due to poor mentation   Depression: Unable to obtain due to poor mentation   Fatigue: awake  Loss of appetite: Unable to obtain due to poor mentation   Constipation: Unable to obtain due to poor mentation     Pain: Appears comfortable            Character-            Duration-            Effect-            Factors-            Frequency-            Location-            Severity-      Pain AD Score:  [email or web address] (press ctrl + left click to view)    Review of Systems: Reviewed  Unable to obtain due to poor mentation   All others negative    MEDICATIONS  (STANDING):  escitalopram 20 milliGRAM(s) Oral daily  heparin   Injectable 5000 Unit(s) SubCutaneous every 12 hours  lactated ringers. 1000 milliLiter(s) (75 mL/Hr) IV Continuous <Continuous>  memantine 10 milliGRAM(s) Oral daily  metoprolol tartrate 25 milliGRAM(s) Oral two times a day  mirtazapine 7.5 milliGRAM(s) Oral at bedtime  tamsulosin 0.8 milliGRAM(s) Oral at bedtime    MEDICATIONS  (PRN):  acetaminophen  Suppository .. 650 milliGRAM(s) Rectal every 6 hours PRN Temp greater or equal to 38C (100.4F), Mild Pain (1 - 3)  aluminum hydroxide/magnesium hydroxide/simethicone Suspension 30 milliLiter(s) Oral every 4 hours PRN Dyspepsia  melatonin 3 milliGRAM(s) Oral at bedtime PRN Insomnia  metoprolol tartrate Injectable 10 milliGRAM(s) IV Push every 6 hours PRN for SBP> 160  ondansetron Injectable 4 milliGRAM(s) IV Push every 8 hours PRN Nausea and/or Vomiting      PHYSICAL EXAM:    Vital Signs Last 24 Hrs  T(C): 37.2 (30 Sep 2021 10:07), Max: 37.2 (30 Sep 2021 04:00)  T(F): 99 (30 Sep 2021 10:07), Max: 99 (30 Sep 2021 04:00)  HR: 98 (30 Sep 2021 12:26) (55 - 98)  BP: 167/73 (30 Sep 2021 12:26) (147/66 - 207/116)  BP(mean): 124 (30 Sep 2021 04:00) (124 - 124)  RR: 19 (30 Sep 2021 12:26) (18 - 19)  SpO2: 96% (30 Sep 2021 12:26) (95% - 100%)    General: now awake alert following commands     HEENT: normal     Lungs: comfortable     CV: normal      GI: incontinent    : incontinent      MSK: weakness      Neuro: cognitive impairment dysphagia improving    Skin: thin frail skin    LABS:                          11.5   7.66  )-----------( 146      ( 30 Sep 2021 04:12 )             35.7     09-30    145  |  105  |  19.4  ----------------------------<  108<H>  3.4<L>   |  26.0  |  0.74    Ca    8.5<L>      30 Sep 2021 04:12    TPro  6.4<L>  /  Alb  3.2<L>  /  TBili  0.4  /  DBili  x   /  AST  100<H>  /  ALT  22  /  AlkPhos  61  09-30    I&O's Summary    29 Sep 2021 07:01  -  30 Sep 2021 07:00  --------------------------------------------------------  IN: 1200 mL / OUT: 503 mL / NET: 697 mL    RADIOLOGY & ADDITIONAL STUDIES:  CXR 09/26/21    IMPRESSION: Grossly clear lungs at this time.    ADVANCE DIRECTIVES/TREATMENT PREFERENCES:  Full Code  
[facility] Division of Hospital Medicine    Chief Complaint:  acute viral illness, +RSV    SUBJECTIVE / OVERNIGHT EVENTS: No acute events overnight. Patient was hypertensive to 170s. Satting well on RA.     Patient denies chest pain, SOB, abd pain, N/V, fever, chills, dysuria or any other complaints. All remainder ROS negative.     MEDICATIONS  (STANDING):  escitalopram 20 milliGRAM(s) Oral daily  heparin   Injectable 5000 Unit(s) SubCutaneous every 12 hours  lactated ringers. 1000 milliLiter(s) (75 mL/Hr) IV Continuous <Continuous>  memantine 10 milliGRAM(s) Oral daily  metoprolol tartrate Injectable 5 milliGRAM(s) IV Push every 8 hours  mirtazapine 7.5 milliGRAM(s) Oral at bedtime  tamsulosin 0.8 milliGRAM(s) Oral at bedtime    MEDICATIONS  (PRN):  acetaminophen  Suppository .. 650 milliGRAM(s) Rectal every 6 hours PRN Temp greater or equal to 38C (100.4F), Mild Pain (1 - 3)  aluminum hydroxide/magnesium hydroxide/simethicone Suspension 30 milliLiter(s) Oral every 4 hours PRN Dyspepsia  melatonin 3 milliGRAM(s) Oral at bedtime PRN Insomnia  ondansetron Injectable 4 milliGRAM(s) IV Push every 8 hours PRN Nausea and/or Vomiting        I&O's Summary      PHYSICAL EXAM:  Vital Signs Last 24 Hrs  T(C): 37.6 (28 Sep 2021 12:53), Max: 37.6 (28 Sep 2021 12:53)  T(F): 99.7 (28 Sep 2021 12:53), Max: 99.7 (28 Sep 2021 12:53)  HR: 78 (28 Sep 2021 12:53) (78 - 82)  BP: 188/72 (28 Sep 2021 12:53) (153/70 - 188/72)  BP(mean): --  RR: 18 (28 Sep 2021 12:53) (16 - 18)  SpO2: 94% (28 Sep 2021 12:53) (93% - 97%)    CONSTITUTIONAL: NAD, well-developed  RESPIRATORY: Normal respiratory effort; lungs are clear to auscultation bilaterally  CARDIOVASCULAR: Regular rate and rhythm, normal S1 and S2  ABDOMEN: Nontender to palpation, normoactive bowel sounds  MUSCULOSKELETAL: no clubbing or cyanosis of digits  PSYCH: A+Ox 1 person; affect appropriate  NEUROLOGY: CN 2-12 are intact and symmetric; no gross sensory deficits    LABS:                        11.2   8.85  )-----------( 137      ( 28 Sep 2021 04:23 )             34.3         144  |  106  |  20.6<H>  ----------------------------<  121<H>  3.8   |  23.0  |  0.87    Ca    8.3<L>      28 Sep 2021 04:23    TPro  6.1<L>  /  Alb  3.6  /  TBili  0.5  /  DBili  x   /  AST  34  /  ALT  13  /  AlkPhos  60      PT/INR - ( 26 Sep 2021 21:41 )   PT: 13.3 sec;   INR: 1.15 ratio         PTT - ( 26 Sep 2021 21:41 )  PTT:33.7 sec      Urinalysis Basic - ( 26 Sep 2021 22:36 )    Color: Yellow / Appearance: Clear / S.020 / pH: x  Gluc: x / Ketone: Trace  / Bili: Negative / Urobili: Negative mg/dL   Blood: x / Protein: 30 mg/dL / Nitrite: Negative   Leuk Esterase: Trace / RBC: 0-2 /HPF / WBC 0-2   Sq Epi: x / Non Sq Epi: Occasional / Bacteria: Occasional        CAPILLARY BLOOD GLUCOSE            RADIOLOGY & ADDITIONAL TESTS:  Results Reviewed:   Imaging Personally Reviewed:  Electrocardiogram Personally Reviewed:                                          
[facility] Division of Hospital Medicine    Chief Complaint: acute viral illness, +RSV    SUBJECTIVE / OVERNIGHT EVENTS: No acute events overnight. Patient required NC for desaturation and is now satting 100%.    Patient denies chest pain, SOB, abd pain, N/V, fever, chills, dysuria or any other complaints. All remainder ROS negative.     MEDICATIONS  (STANDING):  escitalopram 20 milliGRAM(s) Oral daily  heparin   Injectable 5000 Unit(s) SubCutaneous every 12 hours  lactated ringers. 1000 milliLiter(s) (75 mL/Hr) IV Continuous <Continuous>  memantine 10 milliGRAM(s) Oral daily  metoprolol tartrate Injectable 5 milliGRAM(s) IV Push every 6 hours  mirtazapine 7.5 milliGRAM(s) Oral at bedtime  tamsulosin 0.8 milliGRAM(s) Oral at bedtime    MEDICATIONS  (PRN):  acetaminophen  Suppository .. 650 milliGRAM(s) Rectal every 6 hours PRN Temp greater or equal to 38C (100.4F), Mild Pain (1 - 3)  aluminum hydroxide/magnesium hydroxide/simethicone Suspension 30 milliLiter(s) Oral every 4 hours PRN Dyspepsia  melatonin 3 milliGRAM(s) Oral at bedtime PRN Insomnia  ondansetron Injectable 4 milliGRAM(s) IV Push every 8 hours PRN Nausea and/or Vomiting        I&O's Summary    28 Sep 2021 07:01  -  29 Sep 2021 07:00  --------------------------------------------------------  IN: 637.5 mL / OUT: 0 mL / NET: 637.5 mL        PHYSICAL EXAM:  Vital Signs Last 24 Hrs  T(C): 36.6 (29 Sep 2021 11:48), Max: 38.3 (28 Sep 2021 22:15)  T(F): 97.8 (29 Sep 2021 11:48), Max: 101 (28 Sep 2021 22:15)  HR: 65 (29 Sep 2021 11:48) (63 - 78)  BP: 163/66 (29 Sep 2021 11:48) (141/72 - 170/71)  BP(mean): --  RR: 18 (29 Sep 2021 11:48) (17 - 18)  SpO2: 98% (29 Sep 2021 11:48) (95% - 100%)      CONSTITUTIONAL: NAD, well-developed  RESPIRATORY: Normal respiratory effort; lungs are clear to auscultation bilaterally  CARDIOVASCULAR: Regular rate and rhythm, normal S1 and S2  ABDOMEN: Nontender to palpation, normoactive bowel sounds  MUSCULOSKELETAL: no clubbing or cyanosis of digits  PSYCH: A+Ox 1 person; affect appropriate  NEUROLOGY: CN 2-12 are intact and symmetric; no gross sensory deficits    LABS:                        10.8   6.95  )-----------( 143      ( 29 Sep 2021 07:45 )             33.6     09-28    144  |  106  |  20.6<H>  ----------------------------<  121<H>  3.8   |  23.0  |  0.87    Ca    8.3<L>      28 Sep 2021 04:23    TPro  6.1<L>  /  Alb  3.6  /  TBili  0.5  /  DBili  x   /  AST  34  /  ALT  13  /  AlkPhos  60  09-28              Culture - Blood (collected 26 Sep 2021 22:19)  Source: .Blood Blood-Peripheral  Preliminary Report (28 Sep 2021 23:00):    No growth at 48 hours    Culture - Blood (collected 26 Sep 2021 22:18)  Source: .Blood Blood-Peripheral  Preliminary Report (28 Sep 2021 23:00):    No growth at 48 hours      CAPILLARY BLOOD GLUCOSE            RADIOLOGY & ADDITIONAL TESTS:  Results Reviewed:   Imaging Personally Reviewed:  Electrocardiogram Personally Reviewed:                                          
[facility] Division of Hospital Medicine    Chief Complaint:  acute viral illness, +RSV    SUBJECTIVE / OVERNIGHT EVENTS: No acute events overnight. Patient required NC 3L and has been satting 97%.     Patient denies chest pain, SOB, abd pain, N/V, fever, chills, dysuria or any other complaints. All remainder ROS negative.     MEDICATIONS  (STANDING):  escitalopram 20 milliGRAM(s) Oral daily  heparin   Injectable 5000 Unit(s) SubCutaneous every 12 hours  lactated ringers. 1000 milliLiter(s) (75 mL/Hr) IV Continuous <Continuous>  memantine 10 milliGRAM(s) Oral daily  metoprolol tartrate 25 milliGRAM(s) Oral two times a day  mirtazapine 7.5 milliGRAM(s) Oral at bedtime  tamsulosin 0.8 milliGRAM(s) Oral at bedtime    MEDICATIONS  (PRN):  acetaminophen  Suppository .. 650 milliGRAM(s) Rectal every 6 hours PRN Temp greater or equal to 38C (100.4F), Mild Pain (1 - 3)  aluminum hydroxide/magnesium hydroxide/simethicone Suspension 30 milliLiter(s) Oral every 4 hours PRN Dyspepsia  melatonin 3 milliGRAM(s) Oral at bedtime PRN Insomnia  ondansetron Injectable 4 milliGRAM(s) IV Push every 8 hours PRN Nausea and/or Vomiting        I&O's Summary      PHYSICAL EXAM:  Vital Signs Last 24 Hrs  T(C): 37 (27 Sep 2021 11:14), Max: 39.4 (26 Sep 2021 21:45)  T(F): 98.6 (27 Sep 2021 11:14), Max: 102.9 (26 Sep 2021 21:45)  HR: 65 (27 Sep 2021 11:14) (63 - 103)  BP: 147/66 (27 Sep 2021 11:14) (129/62 - 182/88)  BP(mean): --  RR: 16 (27 Sep 2021 11:14) (14 - 20)  SpO2: 97% (27 Sep 2021 11:14) (91% - 97%)        CONSTITUTIONAL: NAD, well-developed  RESPIRATORY: Normal respiratory effort; lungs are clear to auscultation bilaterally  CARDIOVASCULAR: Regular rate and rhythm, normal S1 and S2  ABDOMEN: Nontender to palpation, normoactive bowel sounds  MUSCULOSKELETAL: no clubbing or cyanosis of digits  PSYCH: A+Ox 1 person; affect appropriate  NEUROLOGY: CN 2-12 are intact and symmetric; no gross sensory deficits;     LABS:                        11.8   10.95 )-----------( 139      ( 27 Sep 2021 07:38 )             36.5         141  |  106  |  22.7<H>  ----------------------------<  135<H>  3.7   |  22.0  |  0.78    Ca    8.5<L>      27 Sep 2021 07:38    TPro  7.0  /  Alb  4.3  /  TBili  0.4  /  DBili  x   /  AST  20  /  ALT  14  /  AlkPhos  65      PT/INR - ( 26 Sep 2021 21:41 )   PT: 13.3 sec;   INR: 1.15 ratio         PTT - ( 26 Sep 2021 21:41 )  PTT:33.7 sec      Urinalysis Basic - ( 26 Sep 2021 22:36 )    Color: Yellow / Appearance: Clear / S.020 / pH: x  Gluc: x / Ketone: Trace  / Bili: Negative / Urobili: Negative mg/dL   Blood: x / Protein: 30 mg/dL / Nitrite: Negative   Leuk Esterase: Trace / RBC: 0-2 /HPF / WBC 0-2   Sq Epi: x / Non Sq Epi: Occasional / Bacteria: Occasional        CAPILLARY BLOOD GLUCOSE            RADIOLOGY & ADDITIONAL TESTS:  Results Reviewed:   Imaging Personally Reviewed:  Electrocardiogram Personally Reviewed:

## 2021-10-01 NOTE — DISCHARGE NOTE PROVIDER - NSDCMRMEDTOKEN_GEN_ALL_CORE_FT
Lexapro 20 mg oral tablet: 1 tab(s) orally once a day  memantine 28 mg oral capsule, extended release: 1 cap(s) orally once a day  metoprolol tartrate 25 mg oral tablet: 1 tab(s) orally 2 times a day  mirtazapine 7.5 mg oral tablet: 2 tab(s) orally once a day (at bedtime)  Multiple Vitamins oral capsule: 1 cap(s) orally once a day  tamsulosin 0.4 mg oral capsule: 2 cap(s) orally once a day (at bedtime)  ubiquinone 100 mg oral capsule: 1 cap(s) orally once a day

## 2021-10-01 NOTE — DISCHARGE NOTE PROVIDER - CARE PROVIDER_API CALL
Jossue Doyle)  Infectious Disease; Internal Medicine  [address] [place] [address], [place] [address]  Phone: [phone]  Fax: [phone]  Follow Up Time:

## 2021-10-05 ENCOUNTER — NON-APPOINTMENT (OUTPATIENT)
Age: 86
End: 2021-10-05

## 2021-10-06 PROBLEM — J21.0 RSV BRONCHIOLITIS: Status: ACTIVE | Noted: 2021-10-06

## 2021-10-06 PROBLEM — Z76.89 ENCOUNTER FOR SUPPORT AND COORDINATION OF TRANSITION OF CARE: Status: ACTIVE | Noted: 2021-10-06

## 2021-10-07 ENCOUNTER — APPOINTMENT (OUTPATIENT)
Dept: INTERNAL MEDICINE | Facility: CLINIC | Age: 86
End: 2021-10-07
Payer: MEDICARE

## 2021-10-07 VITALS
SYSTOLIC BLOOD PRESSURE: 100 MMHG | WEIGHT: 152 LBS | DIASTOLIC BLOOD PRESSURE: 40 MMHG | BODY MASS INDEX: 24.43 KG/M2 | HEIGHT: 66 IN

## 2021-10-07 DIAGNOSIS — Z76.89 PERSONS ENCOUNTERING HEALTH SERVICES IN OTHER SPECIFIED CIRCUMSTANCES: ICD-10-CM

## 2021-10-07 DIAGNOSIS — J21.0 ACUTE BRONCHIOLITIS DUE TO RESPIRATORY SYNCYTIAL VIRUS: ICD-10-CM

## 2021-10-07 PROCEDURE — 99215 OFFICE O/P EST HI 40 MIN: CPT | Mod: 25

## 2021-10-07 PROCEDURE — 99496 TRANSJ CARE MGMT HIGH F2F 7D: CPT

## 2021-10-07 NOTE — ASSESSMENT
[FreeTextEntry1] : Patient examined and adjustments to therapy for HBP metoprolol tartrate is reduced to prior dose his blood pressure was 105 all labs reviewed with patient as well. Review of systems and physical exam discussed with patient. Care plan for future followups discussed with patient. All issues of health for the current year discussed in depth with patient who was conversant and all relevant issues addressed.\par Transition of care performed for reduction in metoprolol and discussion of future plan.  I had an extensive conversation with the son regarding CODE STATUS and that his healthcare proxy he needs to decide along with him.  Input from his dad whether he really wants to go on a respirator.  The son asked my opinion regarding feeding tube and I felt at this point it was not necessary but it is not something I would not routinely recommend as care at home unless the son wants a full code in that case he might require a feeding tube.  I discussed the feeding tube with son as a father does not comprehend this and he will spend more time researching this aspect before making a final decision\par All issues regarding patient's health and medical problems have been discussed. The patient understands and concurs with the treatment plan.  This was an extended visit lasting 45 minutes, including review of prior notes laboratory data and examination and discussing with patient including completion of current note.  Extended time to review of hospital records and discussion with son\par \par

## 2021-10-07 NOTE — HISTORY OF PRESENT ILLNESS
[FreeTextEntry1] : Patient here for evaluation of multiple medical problems and new issues to be discussed\par  [de-identified] : f/up [facility] RSV - seen by ID and discharged home after approximately 4 days.  Patient had no complications had a clear chest x-ray.  There was an issue of mild dysphagia at and diet was modified.  It was listed on the chart that patient is a full code which will be discussed with the son.  Patient is at his baseline now\par Transition of care performed with reconciliation of medications and adjustments of metoprolol dose back to prior.  We also discussed diet going forward.  Patient also asked about feeding tube

## 2021-10-07 NOTE — REVIEW OF SYSTEMS
[Nail Changes] : no nail changes [Skin Rash] : no skin rash [Confusion] : confusion [Unsteady Walk] : ataxia [Memory Loss] : memory loss [Negative] : Heme/Lymph

## 2021-10-07 NOTE — PHYSICAL EXAM
[Well Nourished] : well nourished [Well Developed] : well developed [Well-Appearing] : well-appearing [Normal Sclera/Conjunctiva] : normal sclera/conjunctiva [PERRL] : pupils equal round and reactive to light [EOMI] : extraocular movements intact [Normal Outer Ear/Nose] : the outer ears and nose were normal in appearance [Normal Oropharynx] : the oropharynx was normal [No JVD] : no jugular venous distention [No Lymphadenopathy] : no lymphadenopathy [Supple] : supple [Thyroid Normal, No Nodules] : the thyroid was normal and there were no nodules present [No Respiratory Distress] : no respiratory distress  [No Accessory Muscle Use] : no accessory muscle use [Clear to Auscultation] : lungs were clear to auscultation bilaterally [Normal Rate] : normal rate  [Regular Rhythm] : with a regular rhythm [Normal S1, S2] : normal S1 and S2 [No Carotid Bruits] : no carotid bruits [No Abdominal Bruit] : a ~M bruit was not heard ~T in the abdomen [Pedal Pulses Present] : the pedal pulses are present [No Edema] : there was no peripheral edema [No Palpable Aorta] : no palpable aorta [No Extremity Clubbing/Cyanosis] : no extremity clubbing/cyanosis [Soft] : abdomen soft [Non Tender] : non-tender [Non-distended] : non-distended [No Masses] : no abdominal mass palpated [No HSM] : no HSM [Normal Bowel Sounds] : normal bowel sounds [Normal Posterior Cervical Nodes] : no posterior cervical lymphadenopathy [Normal Anterior Cervical Nodes] : no anterior cervical lymphadenopathy [No CVA Tenderness] : no CVA  tenderness [No Spinal Tenderness] : no spinal tenderness [No Joint Swelling] : no joint swelling [Grossly Normal Strength/Tone] : grossly normal strength/tone [No Rash] : no rash [Coordination Grossly Intact] : coordination grossly intact [No Focal Deficits] : no focal deficits [Normal Gait] : normal gait [Normal Insight/Judgement] : insight and judgment were intact [de-identified] : 1+ edema [de-identified] : 2/6 [de-identified] : Patient is moderately confused to time date and place.  The issue of full code and the meaning of it discussed with patient but my impression showed minimal understanding of the concept

## 2021-10-08 NOTE — CDI QUERY NOTE - NSCDIOTHERTXTBX_GEN_ALL_CORE_HH
Documentation noted a diagnosis of sepsis in the ED documentation. Patient met SIRS criteria on admission and admitted with RSV infection.     Please clarify the status of sepsis  A) Sepsis ruled in, present on admission  B) Sepsis ruled out  C) Other, please specify  D) Not clinically significant     Supporting Documentation:    ED Provider Note [Charted Location: [facility] ED] [Authored: 26-Sep-2021 20:13]-   SEPSIS – was this patient treated for sepsis? Yes.     Presentation suggestive of: Bacterial Etiology.   · Clinical Summary  (MDM): Summarize the clinical encounter	Sepsis - undifferentiated, likely PNA  1) IV Access/IVF/LABS/Lactate (rpt after IVF if elevated)/UA/Cultures/RVP/COVID  2) CXR  3) IV Abx  4) Admit    H&P Adult [Charted Location: [facility] [place]] [Authored: 27-Sep-2021 01:24]  Fever due to +RSV   -febrile, mild leukocytosis, lactate 2.8  -s/p 1L NS bolus, ceftriaxone and Zithromax  -CXR no significant finding for infiltrate or consolidation(unofficial read), UA negative  -RVP +RSV  -fever likely due to acute viral illness, doubt pneumonia at this time   -hold off further abx     Discharge Note Provider [Charted Location: [facility] [place]] [Authored: 01-Oct-2021 10:32  PRINCIPAL DISCHARGE DIAGNOSIS  Diagnosis: RSV infection    V/S on admission:  ED ADULT Flow Sheet [Charted Location: [facility] ED] [Authored: 26-Sep-2021 21:45]  Temperature  Temp (F): 102.9 Degrees F  Temp (C) Temp (C): 39.4 Degrees C  Temp site Temp Site: rectal    ED ADULT Flow Sheet [Charted Location: [facility] ED] [Authored: 26-Sep-2021 23:36]  Respiratory/Pulse Oximetry/Oxygen Therapy  Respiration Rate (breaths/min) Respiration Rate (breaths/min): 20 /min  SpO2 (%) SpO2 (%): 91 %  Temperature  Temp (F): 101.1 Degrees F  Temp (C) Temp (C): 38.4 Degrees C  Temp site Temp Site: oral     ED ADULT Triage Note [Charted Location: [facility] ED] [Authored: 26-Sep-2021 19:34]  · Heart Rate 103 /min    WBC Count: 11.03 K/uL (09.26.21 @ 21:41)     Blood Gas Venous - Lactate: 2.30

## 2021-10-26 PROCEDURE — 96374 THER/PROPH/DIAG INJ IV PUSH: CPT

## 2021-10-26 PROCEDURE — 97530 THERAPEUTIC ACTIVITIES: CPT

## 2021-10-26 PROCEDURE — 85610 PROTHROMBIN TIME: CPT

## 2021-10-26 PROCEDURE — 83605 ASSAY OF LACTIC ACID: CPT

## 2021-10-26 PROCEDURE — 86769 SARS-COV-2 COVID-19 ANTIBODY: CPT

## 2021-10-26 PROCEDURE — 83615 LACTATE (LD) (LDH) ENZYME: CPT

## 2021-10-26 PROCEDURE — 71045 X-RAY EXAM CHEST 1 VIEW: CPT

## 2021-10-26 PROCEDURE — 36415 COLL VENOUS BLD VENIPUNCTURE: CPT

## 2021-10-26 PROCEDURE — 97116 GAIT TRAINING THERAPY: CPT

## 2021-10-26 PROCEDURE — 87040 BLOOD CULTURE FOR BACTERIA: CPT

## 2021-10-26 PROCEDURE — 93005 ELECTROCARDIOGRAM TRACING: CPT

## 2021-10-26 PROCEDURE — 80053 COMPREHEN METABOLIC PANEL: CPT

## 2021-10-26 PROCEDURE — 81001 URINALYSIS AUTO W/SCOPE: CPT

## 2021-10-26 PROCEDURE — 96375 TX/PRO/DX INJ NEW DRUG ADDON: CPT

## 2021-10-26 PROCEDURE — 87086 URINE CULTURE/COLONY COUNT: CPT

## 2021-10-26 PROCEDURE — 99285 EMERGENCY DEPT VISIT HI MDM: CPT | Mod: 25

## 2021-10-26 PROCEDURE — 92610 EVALUATE SWALLOWING FUNCTION: CPT

## 2021-10-26 PROCEDURE — 85730 THROMBOPLASTIN TIME PARTIAL: CPT

## 2021-10-26 PROCEDURE — 80048 BASIC METABOLIC PNL TOTAL CA: CPT

## 2021-10-26 PROCEDURE — 92526 ORAL FUNCTION THERAPY: CPT

## 2021-10-26 PROCEDURE — 94762 N-INVAS EAR/PLS OXIMTRY CONT: CPT

## 2021-10-26 PROCEDURE — 85025 COMPLETE CBC W/AUTO DIFF WBC: CPT

## 2021-10-26 PROCEDURE — 0225U NFCT DS DNA&RNA 21 SARSCOV2: CPT

## 2021-10-26 PROCEDURE — 85027 COMPLETE CBC AUTOMATED: CPT

## 2021-11-18 NOTE — PATIENT PROFILE ADULT. - FUNCTIONAL SCREEN CURRENT LEVEL: COMMUNICATION, MLM
Department of Anesthesiology  Postprocedure Note    Patient: Kika Camilo  MRN: [id number]  YOB: 1941  Date of evaluation: 11/18/2021  Time:  12:06 PM     Procedure Summary     Date: 11/18/21 Room / Location: [address]    Anesthesia Start: 1137 Anesthesia Stop: 5492    Procedure: EGD BIOPSY (N/A Abdomen) Diagnosis: (WORSENING REFLUX, BELCHING,)    Surgeons: Javier Emerson MD Responsible Provider: Claudene Cumins, APRN - CRNA    Anesthesia Type: general ASA Status: 3          Anesthesia Type: general    Cristy Phase I: Cristy Score: 10    Cristy Phase II:      Last vitals: Reviewed and per EMR flowsheets.        Anesthesia Post Evaluation    Patient location during evaluation: bedside  Patient participation: complete - patient participated  Level of consciousness: awake and alert  Pain score: 0  Airway patency: patent  Nausea & Vomiting: no nausea  Complications: no  Cardiovascular status: hemodynamically stable  Respiratory status: acceptable  Hydration status: euvolemic (0) understands/communicates without difficulty

## 2021-12-02 NOTE — BRIEF OPERATIVE NOTE - COMMENTS
post-op plan: WBAT, ancef per SCIP, consider holding DVT prophy due to blood loss anemia and Tenriism beliefs, PT/OT, staple removal 2-3 weeks
none

## 2022-01-14 NOTE — ED ADULT NURSE NOTE - ISOLATION TYPE:
Patient handed off to dialysis RN, patient to be transferred to [address] [place] [address] Rd room 422 after HD. Droplet precautions...

## 2022-03-21 ENCOUNTER — APPOINTMENT (OUTPATIENT)
Dept: INTERNAL MEDICINE | Facility: CLINIC | Age: 87
End: 2022-03-21
Payer: MEDICARE

## 2022-05-02 ENCOUNTER — APPOINTMENT (OUTPATIENT)
Dept: INTERNAL MEDICINE | Facility: CLINIC | Age: 87
End: 2022-05-02
Payer: MEDICARE

## 2022-05-02 VITALS
HEIGHT: 66 IN | WEIGHT: 152 LBS | DIASTOLIC BLOOD PRESSURE: 70 MMHG | BODY MASS INDEX: 24.43 KG/M2 | SYSTOLIC BLOOD PRESSURE: 120 MMHG

## 2022-05-02 DIAGNOSIS — Z23 ENCOUNTER FOR IMMUNIZATION: ICD-10-CM

## 2022-05-02 PROCEDURE — 99214 OFFICE O/P EST MOD 30 MIN: CPT | Mod: 25

## 2022-05-02 PROCEDURE — 90732 PPSV23 VACC 2 YRS+ SUBQ/IM: CPT

## 2022-05-02 PROCEDURE — 36415 COLL VENOUS BLD VENIPUNCTURE: CPT

## 2022-05-02 PROCEDURE — G0009: CPT

## 2022-05-02 RX ORDER — MODAFINIL 200 MG/1
200 TABLET ORAL
Qty: 15 | Refills: 0 | Status: ACTIVE | COMMUNITY
Start: 2022-04-01

## 2022-05-02 RX ORDER — MIRTAZAPINE 15 MG/1
15 TABLET, FILM COATED ORAL
Qty: 30 | Refills: 0 | Status: ACTIVE | COMMUNITY
Start: 2022-02-04

## 2022-05-02 NOTE — HISTORY OF PRESENT ILLNESS
[FreeTextEntry1] : Patient here for evaluation of multiple medical problems and new issues to be discussed\par  [de-identified] : f/up [facility] RSV - seen by ID and discharged home after approximately 4 days.  Patient had no complications had a clear chest x-ray.  There was an issue of mild dysphagia at and diet was modified.  It was listed on the chart that patient is a full code which will be discussed with the son.  Patient is at his baseline now\par Transition of care performed with reconciliation of medications and adjustments of metoprolol dose back to prior.  We also discussed diet going forward.  Patient also asked about feeding tube\par \par [id number]\par first vist 7 months\par Patient has not been seen since October 2021.  Patient is doing well with good appetite being taken care of by the patient's son.  He has not seen cardiologist but is compliant with all medications.  No episodes of chest pain or shortness of breath and remains in normal rhythm.  Patient is eating well without any major changes in review of systems.  Aortic valve in place over 5 years without issues and due to see cardiologist.  Patient takes all medications as prescribed

## 2022-05-02 NOTE — PHYSICAL EXAM
[Well Nourished] : well nourished [Well Developed] : well developed [Well-Appearing] : well-appearing [Normal Sclera/Conjunctiva] : normal sclera/conjunctiva [PERRL] : pupils equal round and reactive to light [EOMI] : extraocular movements intact [Normal Outer Ear/Nose] : the outer ears and nose were normal in appearance [Normal Oropharynx] : the oropharynx was normal [No JVD] : no jugular venous distention [No Lymphadenopathy] : no lymphadenopathy [Supple] : supple [Thyroid Normal, No Nodules] : the thyroid was normal and there were no nodules present [No Respiratory Distress] : no respiratory distress  [No Accessory Muscle Use] : no accessory muscle use [Clear to Auscultation] : lungs were clear to auscultation bilaterally [Normal Rate] : normal rate  [Regular Rhythm] : with a regular rhythm [Normal S1, S2] : normal S1 and S2 [No Carotid Bruits] : no carotid bruits [No Abdominal Bruit] : a ~M bruit was not heard ~T in the abdomen [Pedal Pulses Present] : the pedal pulses are present [No Edema] : there was no peripheral edema [No Palpable Aorta] : no palpable aorta [No Extremity Clubbing/Cyanosis] : no extremity clubbing/cyanosis [Soft] : abdomen soft [Non Tender] : non-tender [Non-distended] : non-distended [No Masses] : no abdominal mass palpated [No HSM] : no HSM [Normal Bowel Sounds] : normal bowel sounds [Normal Posterior Cervical Nodes] : no posterior cervical lymphadenopathy [Normal Anterior Cervical Nodes] : no anterior cervical lymphadenopathy [No CVA Tenderness] : no CVA  tenderness [No Spinal Tenderness] : no spinal tenderness [No Joint Swelling] : no joint swelling [Grossly Normal Strength/Tone] : grossly normal strength/tone [No Rash] : no rash [Coordination Grossly Intact] : coordination grossly intact [No Focal Deficits] : no focal deficits [Normal Gait] : normal gait [Normal Insight/Judgement] : insight and judgment were intact [de-identified] : 2/6 [de-identified] : Patient is moderately confused to time date and place.  The issue of full code and the meaning of it discussed with patient but my impression showed minimal understanding of the concept

## 2022-05-02 NOTE — PHYSICAL EXAM
[Well Nourished] : well nourished [Well Developed] : well developed [Well-Appearing] : well-appearing [Normal Sclera/Conjunctiva] : normal sclera/conjunctiva [PERRL] : pupils equal round and reactive to light [EOMI] : extraocular movements intact [Normal Outer Ear/Nose] : the outer ears and nose were normal in appearance [Normal Oropharynx] : the oropharynx was normal [No JVD] : no jugular venous distention [No Lymphadenopathy] : no lymphadenopathy [Supple] : supple [Thyroid Normal, No Nodules] : the thyroid was normal and there were no nodules present [No Respiratory Distress] : no respiratory distress  [No Accessory Muscle Use] : no accessory muscle use [Clear to Auscultation] : lungs were clear to auscultation bilaterally [Normal Rate] : normal rate  [Regular Rhythm] : with a regular rhythm [Normal S1, S2] : normal S1 and S2 [No Carotid Bruits] : no carotid bruits [No Abdominal Bruit] : a ~M bruit was not heard ~T in the abdomen [Pedal Pulses Present] : the pedal pulses are present [No Edema] : there was no peripheral edema [No Palpable Aorta] : no palpable aorta [No Extremity Clubbing/Cyanosis] : no extremity clubbing/cyanosis [Soft] : abdomen soft [Non Tender] : non-tender [Non-distended] : non-distended [No Masses] : no abdominal mass palpated [No HSM] : no HSM [Normal Bowel Sounds] : normal bowel sounds [Normal Posterior Cervical Nodes] : no posterior cervical lymphadenopathy [Normal Anterior Cervical Nodes] : no anterior cervical lymphadenopathy [No CVA Tenderness] : no CVA  tenderness [No Spinal Tenderness] : no spinal tenderness [No Joint Swelling] : no joint swelling [Grossly Normal Strength/Tone] : grossly normal strength/tone [No Rash] : no rash [Coordination Grossly Intact] : coordination grossly intact [No Focal Deficits] : no focal deficits [Normal Gait] : normal gait [Normal Insight/Judgement] : insight and judgment were intact [de-identified] : 2/6 [de-identified] : Patient is moderately confused to time date and place.  The issue of full code and the meaning of it discussed with patient but my impression showed minimal understanding of the concept

## 2022-05-02 NOTE — HISTORY OF PRESENT ILLNESS
[FreeTextEntry1] : Patient here for evaluation of multiple medical problems and new issues to be discussed\par  [de-identified] : f/up [facility] RSV - seen by ID and discharged home after approximately 4 days.  Patient had no complications had a clear chest x-ray.  There was an issue of mild dysphagia at and diet was modified.  It was listed on the chart that patient is a full code which will be discussed with the son.  Patient is at his baseline now\par Transition of care performed with reconciliation of medications and adjustments of metoprolol dose back to prior.  We also discussed diet going forward.  Patient also asked about feeding tube\par \par [id number]\par first vist 7 months\par Patient has not been seen since October 2021.  Patient is doing well with good appetite being taken care of by the patient's son.  He has not seen cardiologist but is compliant with all medications.  No episodes of chest pain or shortness of breath and remains in normal rhythm.  Patient is eating well without any major changes in review of systems.  Aortic valve in place over 5 years without issues and due to see cardiologist.  Patient takes all medications as prescribed

## 2022-05-02 NOTE — ASSESSMENT
[FreeTextEntry1] : Patient examined and adjustments to therapy for HBP not needed all labs reviewed with patient as well. Review of systems and physical exam discussed with patient. Care plan for future followups discussed with patient. All issues of health for the current year discussed in depth with patient who was conversant and all relevant issues addressed.\par Patient with dementia without progression and medically and psychologically stable.  Son relates no explosive behavior and patient doing well at home\par Pneumococcal 23 injection given today routine labs drawn\par All issues regarding patient's health and medical problems have been discussed. The patient understands and concurs with the treatment plan.

## 2022-05-03 ENCOUNTER — TRANSCRIPTION ENCOUNTER (OUTPATIENT)
Age: 87
End: 2022-05-03

## 2022-05-03 LAB
ALBUMIN SERPL ELPH-MCNC: 4.5 G/DL
ALP BLD-CCNC: 65 U/L
ALT SERPL-CCNC: 11 U/L
ANION GAP SERPL CALC-SCNC: 14 MMOL/L
AST SERPL-CCNC: 19 U/L
BASOPHILS # BLD AUTO: 0.05 K/UL
BASOPHILS NFR BLD AUTO: 1.1 %
BILIRUB SERPL-MCNC: 0.3 MG/DL
BUN SERPL-MCNC: 24 MG/DL
CALCIUM SERPL-MCNC: 9 MG/DL
CHLORIDE SERPL-SCNC: 103 MMOL/L
CO2 SERPL-SCNC: 28 MMOL/L
CREAT SERPL-MCNC: 1.02 MG/DL
EGFR: 67 ML/MIN/1.73M2
EOSINOPHIL # BLD AUTO: 0.34 K/UL
EOSINOPHIL NFR BLD AUTO: 7.2 %
GLUCOSE SERPL-MCNC: 135 MG/DL
HCT VFR BLD CALC: 41.9 %
HGB BLD-MCNC: 12.7 G/DL
IMM GRANULOCYTES NFR BLD AUTO: 0.2 %
LYMPHOCYTES # BLD AUTO: 1.46 K/UL
LYMPHOCYTES NFR BLD AUTO: 31 %
MAN DIFF?: NORMAL
MCHC RBC-ENTMCNC: 28.7 PG
MCHC RBC-ENTMCNC: 30.3 GM/DL
MCV RBC AUTO: 94.8 FL
MONOCYTES # BLD AUTO: 0.43 K/UL
MONOCYTES NFR BLD AUTO: 9.1 %
NEUTROPHILS # BLD AUTO: 2.42 K/UL
NEUTROPHILS NFR BLD AUTO: 51.4 %
PLATELET # BLD AUTO: 200 K/UL
POTASSIUM SERPL-SCNC: 4.5 MMOL/L
PROT SERPL-MCNC: 7.2 G/DL
RBC # BLD: 4.42 M/UL
RBC # FLD: 15 %
SODIUM SERPL-SCNC: 145 MMOL/L
WBC # FLD AUTO: 4.71 K/UL

## 2022-05-11 ENCOUNTER — NON-APPOINTMENT (OUTPATIENT)
Age: 87
End: 2022-05-11

## 2022-06-17 NOTE — H&P PST ADULT - NEGATIVE ALLERGY TYPES
Routing refill request to provider for review/approval because:  Asthma Control Assessment score out of limits      Jose Ramon PerryRN  
no outdoor environmental allergies/no indoor environmental allergies

## 2022-11-07 ENCOUNTER — APPOINTMENT (OUTPATIENT)
Dept: INTERNAL MEDICINE | Facility: CLINIC | Age: 87
End: 2022-11-07

## 2022-11-07 VITALS
HEIGHT: 66 IN | SYSTOLIC BLOOD PRESSURE: 110 MMHG | BODY MASS INDEX: 24.11 KG/M2 | WEIGHT: 150 LBS | DIASTOLIC BLOOD PRESSURE: 40 MMHG

## 2022-11-07 DIAGNOSIS — E78.5 HYPERLIPIDEMIA, UNSPECIFIED: ICD-10-CM

## 2022-11-07 PROCEDURE — 36415 COLL VENOUS BLD VENIPUNCTURE: CPT

## 2022-11-07 PROCEDURE — 99214 OFFICE O/P EST MOD 30 MIN: CPT | Mod: 25

## 2022-11-07 RX ORDER — MIRTAZAPINE 7.5 MG/1
7.5 TABLET, FILM COATED ORAL
Refills: 0 | Status: COMPLETED | COMMUNITY
Start: 2019-08-06 | End: 2022-11-07

## 2022-11-07 NOTE — PLAN
[FreeTextEntry1] : Patient examined and adjustments to therapy for HBP not needed all labs reviewed with patient as well. Review of systems and physical exam discussed with patient. Care plan for future followups discussed with patient. All issues of health for the current year discussed in depth with patient who was conversant and all relevant issues addressed.\par Discussed with patient and son the need to take antibiotics prophylactically before dental work to a TAVR\par Dementia stable patient alert no ulcers adequately hydrated not calorically deficient\par All issues regarding patient's health and medical problems have been discussed. The patient understands and concurs with the treatment plan.

## 2022-11-07 NOTE — HISTORY OF PRESENT ILLNESS
[FreeTextEntry1] : Patient here for evaluation of multiple medical problems and new issues to be discussed\par  [de-identified] : f/up [facility] RSV - seen by ID and discharged home after approximately 4 days.  Patient had no complications had a clear chest x-ray.  There was an issue of mild dysphagia at and diet was modified.  It was listed on the chart that patient is a full code which will be discussed with the son.  Patient is at his baseline now\par Transition of care performed with reconciliation of medications and adjustments of metoprolol dose back to prior.  We also discussed diet going forward.  Patient also asked about feeding tube\par \par [id number]\par first vist 7 months\par Patient has not been seen since October 2021.  Patient is doing well with good appetite being taken care of by the patient's son.  He has not seen cardiologist but is compliant with all medications.  No episodes of chest pain or shortness of breath and remains in normal rhythm.  Patient is eating well without any major changes in review of systems.  Aortic valve in place over 5 years without issues and due to see cardiologist.  Patient takes all medications as prescribed\par \par 11/07\par Patient here in routine follow-up.  Doing exceptionally well.  Able to ambulate around the corner with a walker with his son.  Eats well compliant with medication no complaints shortness of breath chest pain.  Has a dental appointment when he will be taking antibiotic prophylaxis.  Compliant with all medications.  No change in dementia.  No new ulcers or sores received vaccines

## 2022-11-07 NOTE — PHYSICAL EXAM
[Well Nourished] : well nourished [Well Developed] : well developed [Well-Appearing] : well-appearing [Normal Sclera/Conjunctiva] : normal sclera/conjunctiva [PERRL] : pupils equal round and reactive to light [EOMI] : extraocular movements intact [Normal Outer Ear/Nose] : the outer ears and nose were normal in appearance [Normal Oropharynx] : the oropharynx was normal [No JVD] : no jugular venous distention [No Lymphadenopathy] : no lymphadenopathy [Supple] : supple [Thyroid Normal, No Nodules] : the thyroid was normal and there were no nodules present [No Respiratory Distress] : no respiratory distress  [No Accessory Muscle Use] : no accessory muscle use [Clear to Auscultation] : lungs were clear to auscultation bilaterally [Normal Rate] : normal rate  [Regular Rhythm] : with a regular rhythm [Normal S1, S2] : normal S1 and S2 [No Carotid Bruits] : no carotid bruits [No Abdominal Bruit] : a ~M bruit was not heard ~T in the abdomen [Pedal Pulses Present] : the pedal pulses are present [No Edema] : there was no peripheral edema [No Palpable Aorta] : no palpable aorta [No Extremity Clubbing/Cyanosis] : no extremity clubbing/cyanosis [Soft] : abdomen soft [Non Tender] : non-tender [Non-distended] : non-distended [No Masses] : no abdominal mass palpated [No HSM] : no HSM [Normal Bowel Sounds] : normal bowel sounds [Normal Posterior Cervical Nodes] : no posterior cervical lymphadenopathy [Normal Anterior Cervical Nodes] : no anterior cervical lymphadenopathy [No CVA Tenderness] : no CVA  tenderness [No Spinal Tenderness] : no spinal tenderness [No Joint Swelling] : no joint swelling [Grossly Normal Strength/Tone] : grossly normal strength/tone [No Rash] : no rash [Coordination Grossly Intact] : coordination grossly intact [No Focal Deficits] : no focal deficits [Normal Gait] : normal gait [Normal Insight/Judgement] : insight and judgment were intact [de-identified] : 2/6 [de-identified] : Patient is moderately confused to time date and place.  The issue of full code and the meaning of it discussed with patient but my impression showed minimal understanding of the concept

## 2022-11-08 ENCOUNTER — NON-APPOINTMENT (OUTPATIENT)
Age: 87
End: 2022-11-08

## 2022-11-08 LAB
ALBUMIN SERPL ELPH-MCNC: 4.2 G/DL
ALP BLD-CCNC: 60 U/L
ALT SERPL-CCNC: 12 U/L
ANION GAP SERPL CALC-SCNC: 12 MMOL/L
AST SERPL-CCNC: 21 U/L
BASOPHILS # BLD AUTO: 0.06 K/UL
BASOPHILS NFR BLD AUTO: 1.2 %
BILIRUB SERPL-MCNC: 0.3 MG/DL
BUN SERPL-MCNC: 24 MG/DL
CALCIUM SERPL-MCNC: 9 MG/DL
CHLORIDE SERPL-SCNC: 105 MMOL/L
CO2 SERPL-SCNC: 28 MMOL/L
CREAT SERPL-MCNC: 1.06 MG/DL
EGFR: 64 ML/MIN/1.73M2
EOSINOPHIL # BLD AUTO: 0.41 K/UL
EOSINOPHIL NFR BLD AUTO: 8 %
GLUCOSE SERPL-MCNC: 100 MG/DL
HCT VFR BLD CALC: 38 %
HGB BLD-MCNC: 11.8 G/DL
IMM GRANULOCYTES NFR BLD AUTO: 0.2 %
LYMPHOCYTES # BLD AUTO: 1.76 K/UL
LYMPHOCYTES NFR BLD AUTO: 34.4 %
MAN DIFF?: NORMAL
MCHC RBC-ENTMCNC: 28.9 PG
MCHC RBC-ENTMCNC: 31.1 GM/DL
MCV RBC AUTO: 93.1 FL
MONOCYTES # BLD AUTO: 0.46 K/UL
MONOCYTES NFR BLD AUTO: 9 %
NEUTROPHILS # BLD AUTO: 2.41 K/UL
NEUTROPHILS NFR BLD AUTO: 47.2 %
PLATELET # BLD AUTO: 180 K/UL
POTASSIUM SERPL-SCNC: 3.9 MMOL/L
PROT SERPL-MCNC: 6.6 G/DL
RBC # BLD: 4.08 M/UL
RBC # FLD: 15.3 %
SODIUM SERPL-SCNC: 145 MMOL/L
TSH SERPL-ACNC: 1.45 UIU/ML
WBC # FLD AUTO: 5.11 K/UL

## 2022-11-21 ENCOUNTER — OFFICE (OUTPATIENT)
Dept: URBAN - METROPOLITAN AREA CLINIC 94 | Facility: CLINIC | Age: 87
Setting detail: OPHTHALMOLOGY
End: 2022-11-21
Payer: MEDICARE

## 2022-11-21 DIAGNOSIS — H43.813: ICD-10-CM

## 2022-11-21 DIAGNOSIS — H35.3211: ICD-10-CM

## 2022-11-21 DIAGNOSIS — H35.3231: ICD-10-CM

## 2022-11-21 DIAGNOSIS — H35.373: ICD-10-CM

## 2022-11-21 PROCEDURE — 67028 INJECTION EYE DRUG: CPT | Performed by: SPECIALIST

## 2022-11-21 PROCEDURE — 92134 CPTRZ OPH DX IMG PST SGM RTA: CPT | Performed by: SPECIALIST

## 2022-11-21 ASSESSMENT — REFRACTION_AUTOREFRACTION
OD_SPHERE: UNABLE
OS_SPHERE: UNABLE

## 2022-11-21 ASSESSMENT — CORNEAL EDEMA CLINICAL DESCRIPTION
OD_CORNEALEDEMA: ABSENT
OS_CORNEALEDEMA: ABSENT

## 2022-11-21 ASSESSMENT — SUPERFICIAL PUNCTATE KERATITIS (SPK)
OD_SPK: 1+
OS_SPK: 1+

## 2022-11-21 ASSESSMENT — CORNEAL DYSTROPHY - POSTERIOR
OS_POSTERIORDYSTROPHY: 4+ FUCHS GUTTATA
OD_POSTERIORDYSTROPHY: 4+ FUCHS GUTTATA

## 2022-11-21 ASSESSMENT — KERATOMETRY
OS_K1POWER_DIOPTERS: 40.00
OS_K2POWER_DIOPTERS: 42.75
OS_AXISANGLE_DEGREES: 156

## 2022-11-21 ASSESSMENT — VISUAL ACUITY
OS_BCVA: 20/80
OD_BCVA: 20/60-1

## 2022-12-08 ENCOUNTER — APPOINTMENT (OUTPATIENT)
Dept: GASTROENTEROLOGY | Facility: CLINIC | Age: 87
End: 2022-12-08

## 2022-12-08 VITALS
TEMPERATURE: 97.7 F | OXYGEN SATURATION: 96 % | DIASTOLIC BLOOD PRESSURE: 56 MMHG | RESPIRATION RATE: 14 BRPM | HEART RATE: 72 BPM | SYSTOLIC BLOOD PRESSURE: 110 MMHG

## 2022-12-08 DIAGNOSIS — K59.09 OTHER CONSTIPATION: ICD-10-CM

## 2022-12-08 PROCEDURE — 99213 OFFICE O/P EST LOW 20 MIN: CPT

## 2022-12-08 RX ORDER — AMOXICILLIN 500 MG/1
500 CAPSULE ORAL ONCE
Qty: 16 | Refills: 0 | Status: DISCONTINUED | COMMUNITY
Start: 2021-09-14 | End: 2022-12-08

## 2022-12-08 RX ORDER — CEFPODOXIME PROXETIL 200 MG/1
200 TABLET, FILM COATED ORAL
Qty: 14 | Refills: 0 | Status: DISCONTINUED | COMMUNITY
Start: 2022-11-29

## 2022-12-08 RX ORDER — FLUTICASONE PROPIONATE 50 MCG
50 SPRAY, SUSPENSION NASAL
Refills: 0 | Status: DISCONTINUED | COMMUNITY

## 2022-12-08 RX ORDER — FINASTERIDE 1 MG/1
1 TABLET ORAL
Refills: 0 | Status: ACTIVE | COMMUNITY

## 2022-12-08 NOTE — ASSESSMENT
[FreeTextEntry1] : I discussed with the patient's son that his actions were appropriate and now that he is moving his bowels normally it is okay to continue with just the Citrucel as well as the fiber 1 cereal.  I do not think the Colace adds anything.  As for the probiotics it would been appropriate to give while he was taking the antibiotic and mentioned that Florastor would be the appropriate probiotic but since he is off it already that there is no reason to give him probiotics at this time.  In the future I said he could use the enemas but also to either increase his Citrucel or add MiraLAX if he starts to get constipated or if there is any question to call me

## 2022-12-08 NOTE — HISTORY OF PRESENT ILLNESS
[FreeTextEntry1] : Patient recently with constipation.  He was given MiraLAX but it did not help so his son gave him mineral oil fleets which I had recommended a few years ago when I initially saw him.  After 2 enemas his problem resolved and now he takes Citrucel every day as well as fiber 1 cereal and is moving his bowels normally.  The son also added Colace but wanted to know if this was appropriate.  He was recently on antibiotics for UTI.  That has been stopped.  The son also wanted to know if he should be taking probiotics.

## 2022-12-12 ENCOUNTER — OFFICE (OUTPATIENT)
Dept: URBAN - METROPOLITAN AREA CLINIC 94 | Facility: CLINIC | Age: 87
Setting detail: OPHTHALMOLOGY
End: 2022-12-12
Payer: MEDICARE

## 2022-12-12 DIAGNOSIS — H35.3231: ICD-10-CM

## 2022-12-12 DIAGNOSIS — H43.813: ICD-10-CM

## 2022-12-12 DIAGNOSIS — H35.373: ICD-10-CM

## 2022-12-12 PROCEDURE — 92134 CPTRZ OPH DX IMG PST SGM RTA: CPT | Performed by: SPECIALIST

## 2022-12-12 PROCEDURE — 92012 INTRM OPH EXAM EST PATIENT: CPT | Performed by: SPECIALIST

## 2022-12-12 ASSESSMENT — CORNEAL EDEMA CLINICAL DESCRIPTION
OS_CORNEALEDEMA: ABSENT
OD_CORNEALEDEMA: ABSENT

## 2022-12-12 ASSESSMENT — KERATOMETRY
OS_K1POWER_DIOPTERS: 40.00
OS_K2POWER_DIOPTERS: 42.75
OS_AXISANGLE_DEGREES: 156

## 2022-12-12 ASSESSMENT — CONFRONTATIONAL VISUAL FIELD TEST (CVF)
OS_FINDINGS: FULL
OD_FINDINGS: FULL

## 2022-12-12 ASSESSMENT — VISUAL ACUITY
OS_BCVA: 20/80
OD_BCVA: 20/60

## 2022-12-12 ASSESSMENT — TONOMETRY
OD_IOP_MMHG: 10
OS_IOP_MMHG: 10

## 2022-12-12 ASSESSMENT — SUPERFICIAL PUNCTATE KERATITIS (SPK)
OS_SPK: 1+
OD_SPK: 1+

## 2022-12-12 ASSESSMENT — CORNEAL DYSTROPHY - POSTERIOR
OD_POSTERIORDYSTROPHY: 4+ FUCHS GUTTATA
OS_POSTERIORDYSTROPHY: 4+ FUCHS GUTTATA

## 2022-12-12 ASSESSMENT — REFRACTION_AUTOREFRACTION
OS_SPHERE: UNABLE
OD_SPHERE: UNABLE

## 2023-01-16 ENCOUNTER — OFFICE (OUTPATIENT)
Dept: URBAN - METROPOLITAN AREA CLINIC 94 | Facility: CLINIC | Age: 88
Setting detail: OPHTHALMOLOGY
End: 2023-01-16
Payer: MEDICARE

## 2023-01-16 DIAGNOSIS — H43.813: ICD-10-CM

## 2023-01-16 DIAGNOSIS — H35.3231: ICD-10-CM

## 2023-01-16 DIAGNOSIS — H35.373: ICD-10-CM

## 2023-01-16 PROCEDURE — 92012 INTRM OPH EXAM EST PATIENT: CPT | Performed by: SPECIALIST

## 2023-01-16 PROCEDURE — 92134 CPTRZ OPH DX IMG PST SGM RTA: CPT | Performed by: SPECIALIST

## 2023-01-16 ASSESSMENT — KERATOMETRY
OS_K2POWER_DIOPTERS: 42.75
OS_AXISANGLE_DEGREES: 156
OS_K1POWER_DIOPTERS: 40.00

## 2023-01-16 ASSESSMENT — TONOMETRY: OD_IOP_MMHG: 10

## 2023-01-16 ASSESSMENT — CORNEAL DYSTROPHY - POSTERIOR
OS_POSTERIORDYSTROPHY: 4+ FUCHS GUTTATA
OD_POSTERIORDYSTROPHY: 4+ FUCHS GUTTATA

## 2023-01-16 ASSESSMENT — SUPERFICIAL PUNCTATE KERATITIS (SPK)
OS_SPK: 1+
OD_SPK: 1+

## 2023-01-16 ASSESSMENT — REFRACTION_AUTOREFRACTION
OD_SPHERE: UNABLE
OS_SPHERE: UNABLE

## 2023-01-16 ASSESSMENT — CORNEAL EDEMA CLINICAL DESCRIPTION
OD_CORNEALEDEMA: ABSENT
OS_CORNEALEDEMA: ABSENT

## 2023-01-16 ASSESSMENT — CONFRONTATIONAL VISUAL FIELD TEST (CVF)
OS_FINDINGS: FULL
OD_FINDINGS: FULL

## 2023-01-16 ASSESSMENT — VISUAL ACUITY
OS_BCVA: 20/80+1
OD_BCVA: 20/70+1

## 2023-02-20 ENCOUNTER — OFFICE (OUTPATIENT)
Dept: URBAN - METROPOLITAN AREA CLINIC 94 | Facility: CLINIC | Age: 88
Setting detail: OPHTHALMOLOGY
End: 2023-02-20
Payer: MEDICARE

## 2023-02-20 DIAGNOSIS — H35.3231: ICD-10-CM

## 2023-02-20 DIAGNOSIS — H43.813: ICD-10-CM

## 2023-02-20 DIAGNOSIS — H35.373: ICD-10-CM

## 2023-02-20 PROCEDURE — 92012 INTRM OPH EXAM EST PATIENT: CPT | Performed by: SPECIALIST

## 2023-02-20 PROCEDURE — 92134 CPTRZ OPH DX IMG PST SGM RTA: CPT | Performed by: SPECIALIST

## 2023-02-20 ASSESSMENT — KERATOMETRY
OS_K2POWER_DIOPTERS: 42.75
OS_K1POWER_DIOPTERS: 40.00
OS_AXISANGLE_DEGREES: 156

## 2023-02-20 ASSESSMENT — CORNEAL DYSTROPHY - POSTERIOR
OS_POSTERIORDYSTROPHY: 4+ FUCHS GUTTATA
OD_POSTERIORDYSTROPHY: 4+ FUCHS GUTTATA

## 2023-02-20 ASSESSMENT — SUPERFICIAL PUNCTATE KERATITIS (SPK)
OD_SPK: 1+
OS_SPK: 1+

## 2023-02-20 ASSESSMENT — REFRACTION_AUTOREFRACTION
OS_SPHERE: UNABLE
OD_SPHERE: UNABLE

## 2023-02-20 ASSESSMENT — CORNEAL EDEMA CLINICAL DESCRIPTION
OD_CORNEALEDEMA: ABSENT
OS_CORNEALEDEMA: ABSENT

## 2023-02-20 ASSESSMENT — CONFRONTATIONAL VISUAL FIELD TEST (CVF)
OS_FINDINGS: FULL
OD_FINDINGS: FULL

## 2023-02-20 ASSESSMENT — VISUAL ACUITY
OD_BCVA: 20/80
OS_BCVA: 20/100

## 2023-03-08 NOTE — DIETITIAN INITIAL EVALUATION ADULT. - WEIGHT CHANGE
no You can access the FollowMyHealth Patient Portal offered by [facility] by registering at the following website: [email or web address]. By joining [employer]’s FollowMyHealth portal, you will also be able to view your health information using other applications (apps) compatible with our system.

## 2023-04-03 ENCOUNTER — OFFICE (OUTPATIENT)
Dept: URBAN - METROPOLITAN AREA CLINIC 94 | Facility: CLINIC | Age: 88
Setting detail: OPHTHALMOLOGY
End: 2023-04-03
Payer: MEDICARE

## 2023-04-03 DIAGNOSIS — H35.3211: ICD-10-CM

## 2023-04-03 PROCEDURE — 67028 INJECTION EYE DRUG: CPT | Performed by: SPECIALIST

## 2023-04-03 PROCEDURE — 92134 CPTRZ OPH DX IMG PST SGM RTA: CPT | Performed by: SPECIALIST

## 2023-04-03 ASSESSMENT — CORNEAL EDEMA CLINICAL DESCRIPTION
OD_CORNEALEDEMA: ABSENT
OS_CORNEALEDEMA: ABSENT

## 2023-04-03 ASSESSMENT — KERATOMETRY
OS_K2POWER_DIOPTERS: 42.75
OS_K1POWER_DIOPTERS: 40.00
OS_AXISANGLE_DEGREES: 156

## 2023-04-03 ASSESSMENT — VISUAL ACUITY
OS_BCVA: 20/150
OD_BCVA: 20/60-1

## 2023-04-03 ASSESSMENT — SUPERFICIAL PUNCTATE KERATITIS (SPK)
OS_SPK: 1+
OD_SPK: 1+

## 2023-04-03 ASSESSMENT — REFRACTION_AUTOREFRACTION
OS_SPHERE: UNABLE
OD_SPHERE: UNABLE

## 2023-04-03 ASSESSMENT — CORNEAL DYSTROPHY - POSTERIOR
OS_POSTERIORDYSTROPHY: 4+ FUCHS GUTTATA
OD_POSTERIORDYSTROPHY: 4+ FUCHS GUTTATA

## 2023-05-04 ENCOUNTER — OFFICE (OUTPATIENT)
Dept: URBAN - METROPOLITAN AREA CLINIC 94 | Facility: CLINIC | Age: 88
Setting detail: OPHTHALMOLOGY
End: 2023-05-04
Payer: MEDICARE

## 2023-05-04 DIAGNOSIS — H43.813: ICD-10-CM

## 2023-05-04 DIAGNOSIS — H35.3231: ICD-10-CM

## 2023-05-04 DIAGNOSIS — H35.373: ICD-10-CM

## 2023-05-04 PROCEDURE — 92134 CPTRZ OPH DX IMG PST SGM RTA: CPT | Performed by: SPECIALIST

## 2023-05-04 PROCEDURE — 92012 INTRM OPH EXAM EST PATIENT: CPT | Performed by: SPECIALIST

## 2023-05-04 ASSESSMENT — CORNEAL EDEMA CLINICAL DESCRIPTION
OD_CORNEALEDEMA: ABSENT
OS_CORNEALEDEMA: ABSENT

## 2023-05-04 ASSESSMENT — VISUAL ACUITY
OD_BCVA: 20/70
OS_BCVA: 20/80+1

## 2023-05-04 ASSESSMENT — CORNEAL DYSTROPHY - POSTERIOR
OS_POSTERIORDYSTROPHY: 4+ FUCHS GUTTATA
OD_POSTERIORDYSTROPHY: 4+ FUCHS GUTTATA

## 2023-05-04 ASSESSMENT — KERATOMETRY
OS_AXISANGLE_DEGREES: 156
OS_K2POWER_DIOPTERS: 42.75
OS_K1POWER_DIOPTERS: 40.00

## 2023-05-04 ASSESSMENT — REFRACTION_AUTOREFRACTION
OS_SPHERE: UNABLE
OD_SPHERE: UNABLE

## 2023-05-04 ASSESSMENT — SUPERFICIAL PUNCTATE KERATITIS (SPK)
OS_SPK: 1+
OD_SPK: 1+

## 2023-05-04 ASSESSMENT — CONFRONTATIONAL VISUAL FIELD TEST (CVF)
OD_FINDINGS: FULL
OS_FINDINGS: FULL

## 2023-05-07 PROBLEM — Z95.2 S/P AVR: Status: ACTIVE | Noted: 2018-12-17

## 2023-05-07 NOTE — HISTORY OF PRESENT ILLNESS
[FreeTextEntry1] : Patient here for evaluation of multiple medical problems and new issues to be discussed\par  [de-identified] : \par 11/07\par Patient here in routine follow-up.  Doing exceptionally well.  Able to ambulate around the corner with a walker with his son.  Eats well compliant with medication no complaints shortness of breath chest pain.  Has a dental appointment when he will be taking antibiotic prophylaxis.  Compliant with all medications.  No change in dementia.  No new ulcers or sores received vaccines\par \par [id number]

## 2023-05-08 ENCOUNTER — APPOINTMENT (OUTPATIENT)
Dept: INTERNAL MEDICINE | Facility: CLINIC | Age: 88
End: 2023-05-08
Payer: MEDICARE

## 2023-05-08 DIAGNOSIS — Z95.2 PRESENCE OF PROSTHETIC HEART VALVE: ICD-10-CM

## 2023-05-19 PROBLEM — N40.0 BPH (BENIGN PROSTATIC HYPERPLASIA): Status: ACTIVE | Noted: 2019-08-06

## 2023-05-19 PROBLEM — G30.1 LATE ONSET ALZHEIMER'S DISEASE WITHOUT BEHAVIORAL DISTURBANCE: Status: ACTIVE | Noted: 2018-12-17

## 2023-05-22 ENCOUNTER — APPOINTMENT (OUTPATIENT)
Dept: INTERNAL MEDICINE | Facility: CLINIC | Age: 88
End: 2023-05-22
Payer: MEDICARE

## 2023-05-22 VITALS
DIASTOLIC BLOOD PRESSURE: 50 MMHG | SYSTOLIC BLOOD PRESSURE: 130 MMHG | WEIGHT: 150 LBS | HEIGHT: 66 IN | BODY MASS INDEX: 24.11 KG/M2

## 2023-05-22 DIAGNOSIS — N40.0 BENIGN PROSTATIC HYPERPLASIA WITHOUT LOWER URINARY TRACT SYMPMS: ICD-10-CM

## 2023-05-22 DIAGNOSIS — G30.1 ALZHEIMER'S DISEASE WITH LATE ONSET: ICD-10-CM

## 2023-05-22 DIAGNOSIS — F02.80 ALZHEIMER'S DISEASE WITH LATE ONSET: ICD-10-CM

## 2023-05-22 PROCEDURE — 99214 OFFICE O/P EST MOD 30 MIN: CPT | Mod: 25

## 2023-05-22 PROCEDURE — 36415 COLL VENOUS BLD VENIPUNCTURE: CPT

## 2023-05-22 NOTE — HISTORY OF PRESENT ILLNESS
[FreeTextEntry1] : Patient here for evaluation of multiple medical problems and new issues to be discussed\par  [de-identified] : \par 11/07\par Patient here in routine follow-up.  Doing exceptionally well.  Able to ambulate around the corner with a walker with his son.  Eats well compliant with medication no complaints shortness of breath chest pain.  Has a dental appointment when he will be taking antibiotic prophylaxis.  Compliant with all medications.  No change in dementia.  No new ulcers or sores received vaccines\par \par [id number]\par ASX\par Patient continues to do well no change in medication.  Walks maybe 10 minutes a day no bedsores no deterioration in mental status.  Taken care of by aide and son.  No infectious problems and since last visit stable from a cardiac standpoint

## 2023-05-22 NOTE — PHYSICAL EXAM
[Well Nourished] : well nourished [Well Developed] : well developed [Well-Appearing] : well-appearing [Normal Sclera/Conjunctiva] : normal sclera/conjunctiva [PERRL] : pupils equal round and reactive to light [EOMI] : extraocular movements intact [Normal Outer Ear/Nose] : the outer ears and nose were normal in appearance [Normal Oropharynx] : the oropharynx was normal [No JVD] : no jugular venous distention [No Lymphadenopathy] : no lymphadenopathy [Supple] : supple [Thyroid Normal, No Nodules] : the thyroid was normal and there were no nodules present [No Respiratory Distress] : no respiratory distress  [No Accessory Muscle Use] : no accessory muscle use [Clear to Auscultation] : lungs were clear to auscultation bilaterally [Normal Rate] : normal rate  [Regular Rhythm] : with a regular rhythm [Normal S1, S2] : normal S1 and S2 [No Carotid Bruits] : no carotid bruits [No Abdominal Bruit] : a ~M bruit was not heard ~T in the abdomen [Pedal Pulses Present] : the pedal pulses are present [No Edema] : there was no peripheral edema [No Palpable Aorta] : no palpable aorta [No Extremity Clubbing/Cyanosis] : no extremity clubbing/cyanosis [Soft] : abdomen soft [Non Tender] : non-tender [Non-distended] : non-distended [No Masses] : no abdominal mass palpated [No HSM] : no HSM [Normal Bowel Sounds] : normal bowel sounds [No CVA Tenderness] : no CVA  tenderness [No Spinal Tenderness] : no spinal tenderness [No Joint Swelling] : no joint swelling [Grossly Normal Strength/Tone] : grossly normal strength/tone [No Rash] : no rash [Coordination Grossly Intact] : coordination grossly intact [No Focal Deficits] : no focal deficits [Normal Gait] : normal gait [Normal Insight/Judgement] : insight and judgment were intact [de-identified] : 2/6 [de-identified] : Patient is moderately confused to time date and place.  The issue of full code and the meaning of it discussed with patient but my impression showed minimal understanding of the concept

## 2023-05-22 NOTE — ASSESSMENT
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 Review of systems and physical exam discussed with patient. Care plan for future followups discussed with patient. All issues of health for the current year discussed in depth with patient who was conversant and all relevant issues addressed.\par Baseline memory deficits and essentially nonverbal but no change in exam.  Labs updated medications reconciled follow-up every 6 months

## 2023-05-23 ENCOUNTER — TRANSCRIPTION ENCOUNTER (OUTPATIENT)
Age: 88
End: 2023-05-23

## 2023-05-23 LAB
ALBUMIN SERPL ELPH-MCNC: 4.3 G/DL
ALP BLD-CCNC: 72 U/L
ALT SERPL-CCNC: 9 U/L
ANION GAP SERPL CALC-SCNC: 14 MMOL/L
AST SERPL-CCNC: 18 U/L
BILIRUB SERPL-MCNC: 0.2 MG/DL
BUN SERPL-MCNC: 27 MG/DL
CALCIUM SERPL-MCNC: 9.2 MG/DL
CHLORIDE SERPL-SCNC: 104 MMOL/L
CO2 SERPL-SCNC: 26 MMOL/L
CREAT SERPL-MCNC: 1.14 MG/DL
EGFR: 58 ML/MIN/1.73M2
GLUCOSE SERPL-MCNC: 133 MG/DL
POTASSIUM SERPL-SCNC: 4.6 MMOL/L
PROT SERPL-MCNC: 6.3 G/DL
SODIUM SERPL-SCNC: 143 MMOL/L
TSH SERPL-ACNC: 1.49 UIU/ML

## 2023-06-12 ENCOUNTER — OFFICE (OUTPATIENT)
Dept: URBAN - METROPOLITAN AREA CLINIC 94 | Facility: CLINIC | Age: 88
Setting detail: OPHTHALMOLOGY
End: 2023-06-12
Payer: MEDICARE

## 2023-06-12 DIAGNOSIS — H35.373: ICD-10-CM

## 2023-06-12 DIAGNOSIS — H35.3231: ICD-10-CM

## 2023-06-12 DIAGNOSIS — H35.3211: ICD-10-CM

## 2023-06-12 DIAGNOSIS — H43.813: ICD-10-CM

## 2023-06-12 PROCEDURE — 92134 CPTRZ OPH DX IMG PST SGM RTA: CPT | Performed by: SPECIALIST

## 2023-06-12 PROCEDURE — 67028 INJECTION EYE DRUG: CPT | Performed by: SPECIALIST

## 2023-06-12 ASSESSMENT — REFRACTION_AUTOREFRACTION
OS_SPHERE: UNABLE
OD_SPHERE: UNABLE

## 2023-06-12 ASSESSMENT — KERATOMETRY
OS_AXISANGLE_DEGREES: 156
OS_K2POWER_DIOPTERS: 42.75
OS_K1POWER_DIOPTERS: 40.00

## 2023-06-12 ASSESSMENT — CONFRONTATIONAL VISUAL FIELD TEST (CVF)
OS_FINDINGS: FULL
OD_FINDINGS: FULL

## 2023-06-12 ASSESSMENT — CORNEAL DYSTROPHY - POSTERIOR
OD_POSTERIORDYSTROPHY: 4+ FUCHS GUTTATA
OS_POSTERIORDYSTROPHY: 4+ FUCHS GUTTATA

## 2023-06-12 ASSESSMENT — TONOMETRY: OS_IOP_MMHG: 13

## 2023-06-12 ASSESSMENT — SUPERFICIAL PUNCTATE KERATITIS (SPK)
OS_SPK: 1+
OD_SPK: 1+

## 2023-06-12 ASSESSMENT — VISUAL ACUITY
OD_BCVA: 20/80
OS_BCVA: 20/80

## 2023-06-12 ASSESSMENT — CORNEAL EDEMA CLINICAL DESCRIPTION
OS_CORNEALEDEMA: ABSENT
OD_CORNEALEDEMA: ABSENT

## 2023-07-06 ENCOUNTER — OFFICE (OUTPATIENT)
Dept: URBAN - METROPOLITAN AREA CLINIC 94 | Facility: CLINIC | Age: 88
Setting detail: OPHTHALMOLOGY
End: 2023-07-06
Payer: MEDICARE

## 2023-07-06 DIAGNOSIS — H35.3231: ICD-10-CM

## 2023-07-06 DIAGNOSIS — H43.813: ICD-10-CM

## 2023-07-06 DIAGNOSIS — H35.373: ICD-10-CM

## 2023-07-06 PROCEDURE — 92134 CPTRZ OPH DX IMG PST SGM RTA: CPT | Performed by: SPECIALIST

## 2023-07-06 PROCEDURE — 92012 INTRM OPH EXAM EST PATIENT: CPT | Performed by: SPECIALIST

## 2023-07-06 ASSESSMENT — CONFRONTATIONAL VISUAL FIELD TEST (CVF)
OD_FINDINGS: FULL
OS_FINDINGS: FULL

## 2023-07-06 ASSESSMENT — VISUAL ACUITY
OS_BCVA: 20/80
OD_BCVA: 20/80

## 2023-07-06 ASSESSMENT — SUPERFICIAL PUNCTATE KERATITIS (SPK)
OD_SPK: 1+
OS_SPK: 1+

## 2023-07-06 ASSESSMENT — KERATOMETRY
OS_AXISANGLE_DEGREES: 156
OS_K2POWER_DIOPTERS: 42.75
OS_K1POWER_DIOPTERS: 40.00

## 2023-07-06 ASSESSMENT — CORNEAL DYSTROPHY - POSTERIOR
OS_POSTERIORDYSTROPHY: 4+ FUCHS GUTTATA
OD_POSTERIORDYSTROPHY: 4+ FUCHS GUTTATA

## 2023-07-06 ASSESSMENT — REFRACTION_AUTOREFRACTION
OS_SPHERE: UNABLE
OD_SPHERE: UNABLE

## 2023-07-06 ASSESSMENT — TONOMETRY
OD_IOP_MMHG: 11
OS_IOP_MMHG: 13

## 2023-07-06 ASSESSMENT — CORNEAL EDEMA CLINICAL DESCRIPTION
OD_CORNEALEDEMA: ABSENT
OS_CORNEALEDEMA: ABSENT

## 2023-07-12 NOTE — DISCHARGE NOTE PROVIDER - NS AS DC PROVIDER CONTACT Y/N MULTI
Anesthesia Post-op Note    Patient: Rohini  Procedure(s) Performed: BILATERAL MAXILLARY ANTROSTOMY WITH TISSUE REMOVAL/ BILATERAL FRONTAL ETHMOIDECTOMY/ BILATERAL SPHENOIDOTOMY/ SEPTOPLASTY/ BILATERAL SUBMUCOUS RESECTION OF INFERIOR NASAL TURBINATES, CT SINUS NAVIGATION - BILATERAL  Anesthesia type: General    Vitals Value Taken Time   Temp 36.4 °C (97.5 °F) 07/12/23 1509   Pulse 68 07/12/23 1509   Resp 13 07/12/23 1509   SpO2 94 % 07/12/23 1509   /95 07/12/23 1509         Patient Location: PACU Phase 1  Post-op Vital Signs:stable  Level of Consciousness: awake and alert  Respiratory Status: spontaneous ventilation  Cardiovascular stable  Hydration: euvolemic  Pain Management: adequately controlled  Handoff: Handoff to receiving clinician was performed and questions were answered  Vomiting: none  Nausea: None  Airway Patency:patent  Post-op Assessment: no complications and patient tolerated procedure well      No notable events documented.   Yes

## 2023-08-03 ENCOUNTER — OFFICE (OUTPATIENT)
Dept: URBAN - METROPOLITAN AREA CLINIC 94 | Facility: CLINIC | Age: 88
Setting detail: OPHTHALMOLOGY
End: 2023-08-03
Payer: MEDICARE

## 2023-08-03 DIAGNOSIS — H43.813: ICD-10-CM

## 2023-08-03 DIAGNOSIS — H35.3231: ICD-10-CM

## 2023-08-03 DIAGNOSIS — H35.373: ICD-10-CM

## 2023-08-03 PROCEDURE — 92012 INTRM OPH EXAM EST PATIENT: CPT | Performed by: SPECIALIST

## 2023-08-03 PROCEDURE — 92134 CPTRZ OPH DX IMG PST SGM RTA: CPT | Performed by: SPECIALIST

## 2023-08-03 ASSESSMENT — REFRACTION_AUTOREFRACTION
OS_SPHERE: UNABLE
OD_SPHERE: UNABLE

## 2023-08-03 ASSESSMENT — TONOMETRY
OD_IOP_MMHG: 08
OS_IOP_MMHG: 09

## 2023-08-03 ASSESSMENT — CORNEAL EDEMA CLINICAL DESCRIPTION
OS_CORNEALEDEMA: ABSENT
OD_CORNEALEDEMA: ABSENT

## 2023-08-03 ASSESSMENT — KERATOMETRY
OS_K2POWER_DIOPTERS: 42.75
OS_AXISANGLE_DEGREES: 156
OS_K1POWER_DIOPTERS: 40.00

## 2023-08-03 ASSESSMENT — CORNEAL DYSTROPHY - POSTERIOR
OS_POSTERIORDYSTROPHY: 4+ FUCHS GUTTATA
OD_POSTERIORDYSTROPHY: 4+ FUCHS GUTTATA

## 2023-08-03 ASSESSMENT — CONFRONTATIONAL VISUAL FIELD TEST (CVF)
OS_FINDINGS: FULL
OD_FINDINGS: FULL

## 2023-08-03 ASSESSMENT — SUPERFICIAL PUNCTATE KERATITIS (SPK)
OS_SPK: 1+
OD_SPK: 1+

## 2023-08-03 ASSESSMENT — VISUAL ACUITY
OS_BCVA: 20/80
OD_BCVA: 20/80

## 2023-09-21 ENCOUNTER — OFFICE (OUTPATIENT)
Dept: URBAN - METROPOLITAN AREA CLINIC 94 | Facility: CLINIC | Age: 88
Setting detail: OPHTHALMOLOGY
End: 2023-09-21
Payer: MEDICARE

## 2023-09-21 DIAGNOSIS — H35.3211: ICD-10-CM

## 2023-09-21 DIAGNOSIS — H35.373: ICD-10-CM

## 2023-09-21 DIAGNOSIS — H35.3231: ICD-10-CM

## 2023-09-21 DIAGNOSIS — H43.813: ICD-10-CM

## 2023-09-21 PROCEDURE — 92134 CPTRZ OPH DX IMG PST SGM RTA: CPT | Performed by: SPECIALIST

## 2023-09-21 PROCEDURE — 67028 INJECTION EYE DRUG: CPT | Performed by: SPECIALIST

## 2023-09-21 ASSESSMENT — CONFRONTATIONAL VISUAL FIELD TEST (CVF)
OS_FINDINGS: FULL
OD_FINDINGS: FULL

## 2023-09-21 ASSESSMENT — CORNEAL EDEMA CLINICAL DESCRIPTION
OS_CORNEALEDEMA: ABSENT
OD_CORNEALEDEMA: ABSENT

## 2023-09-21 ASSESSMENT — SUPERFICIAL PUNCTATE KERATITIS (SPK)
OS_SPK: 1+
OD_SPK: 1+

## 2023-09-21 ASSESSMENT — CORNEAL DYSTROPHY - POSTERIOR
OS_POSTERIORDYSTROPHY: 4+ FUCHS GUTTATA
OD_POSTERIORDYSTROPHY: 4+ FUCHS GUTTATA

## 2023-09-21 ASSESSMENT — KERATOMETRY
OS_K2POWER_DIOPTERS: 42.75
OS_K1POWER_DIOPTERS: 40.00
OS_AXISANGLE_DEGREES: 156

## 2023-09-21 ASSESSMENT — REFRACTION_AUTOREFRACTION
OD_SPHERE: UNABLE
OS_SPHERE: UNABLE

## 2023-09-21 ASSESSMENT — VISUAL ACUITY
OS_BCVA: 20/100+1
OD_BCVA: 20/70-1

## 2023-11-06 ENCOUNTER — OFFICE (OUTPATIENT)
Dept: URBAN - METROPOLITAN AREA CLINIC 94 | Facility: CLINIC | Age: 88
Setting detail: OPHTHALMOLOGY
End: 2023-11-06
Payer: MEDICARE

## 2023-11-06 DIAGNOSIS — H43.813: ICD-10-CM

## 2023-11-06 DIAGNOSIS — H35.3231: ICD-10-CM

## 2023-11-06 DIAGNOSIS — H35.373: ICD-10-CM

## 2023-11-06 PROCEDURE — 92014 COMPRE OPH EXAM EST PT 1/>: CPT | Performed by: SPECIALIST

## 2023-11-06 PROCEDURE — 92134 CPTRZ OPH DX IMG PST SGM RTA: CPT | Performed by: SPECIALIST

## 2023-11-06 ASSESSMENT — CORNEAL EDEMA CLINICAL DESCRIPTION
OD_CORNEALEDEMA: ABSENT
OS_CORNEALEDEMA: ABSENT

## 2023-11-06 ASSESSMENT — CONFRONTATIONAL VISUAL FIELD TEST (CVF)
OS_FINDINGS: FULL
OD_FINDINGS: FULL

## 2023-11-06 ASSESSMENT — REFRACTION_AUTOREFRACTION
OS_SPHERE: UNABLE
OD_SPHERE: UNABLE

## 2023-11-06 ASSESSMENT — SUPERFICIAL PUNCTATE KERATITIS (SPK)
OS_SPK: 1+
OD_SPK: 1+

## 2023-11-06 ASSESSMENT — CORNEAL DYSTROPHY - POSTERIOR
OS_POSTERIORDYSTROPHY: 4+ FUCHS GUTTATA
OD_POSTERIORDYSTROPHY: 4+ FUCHS GUTTATA

## 2023-11-19 PROBLEM — I10 BENIGN ESSENTIAL HYPERTENSION: Status: ACTIVE | Noted: 2018-12-17

## 2023-11-19 PROBLEM — F03.90 DEMENTIA: Status: ACTIVE | Noted: 2019-08-06

## 2023-11-19 PROBLEM — I50.30 (HFPEF) HEART FAILURE WITH PRESERVED EJECTION FRACTION: Status: ACTIVE | Noted: 2019-08-06

## 2023-11-20 ENCOUNTER — APPOINTMENT (OUTPATIENT)
Dept: INTERNAL MEDICINE | Facility: CLINIC | Age: 88
End: 2023-11-20
Payer: MEDICARE

## 2023-11-20 VITALS
HEIGHT: 66 IN | BODY MASS INDEX: 24.11 KG/M2 | WEIGHT: 150 LBS | DIASTOLIC BLOOD PRESSURE: 52 MMHG | SYSTOLIC BLOOD PRESSURE: 130 MMHG

## 2023-11-20 DIAGNOSIS — F03.90 UNSPECIFIED DEMENTIA W/OUT BEHAVIORAL DISTURBANCE: ICD-10-CM

## 2023-11-20 DIAGNOSIS — I10 ESSENTIAL (PRIMARY) HYPERTENSION: ICD-10-CM

## 2023-11-20 DIAGNOSIS — I50.30 UNSPECIFIED DIASTOLIC (CONGESTIVE) HEART FAILURE: ICD-10-CM

## 2023-11-20 PROCEDURE — 99204 OFFICE O/P NEW MOD 45 MIN: CPT | Mod: 25

## 2023-11-20 PROCEDURE — 99214 OFFICE O/P EST MOD 30 MIN: CPT | Mod: 25

## 2023-11-20 PROCEDURE — 36415 COLL VENOUS BLD VENIPUNCTURE: CPT

## 2023-11-21 ENCOUNTER — TRANSCRIPTION ENCOUNTER (OUTPATIENT)
Age: 88
End: 2023-11-21

## 2023-11-21 LAB
ALBUMIN SERPL ELPH-MCNC: 4.2 G/DL
ALP BLD-CCNC: 65 U/L
ALT SERPL-CCNC: 15 U/L
ANION GAP SERPL CALC-SCNC: 12 MMOL/L
AST SERPL-CCNC: 15 U/L
BASOPHILS # BLD AUTO: 0.08 K/UL
BASOPHILS NFR BLD AUTO: 1.4 %
BILIRUB SERPL-MCNC: 0.2 MG/DL
BUN SERPL-MCNC: 30 MG/DL
CALCIUM SERPL-MCNC: 9.2 MG/DL
CHLORIDE SERPL-SCNC: 102 MMOL/L
CO2 SERPL-SCNC: 28 MMOL/L
CREAT SERPL-MCNC: 1.03 MG/DL
EGFR: 66 ML/MIN/1.73M2
EOSINOPHIL # BLD AUTO: 0.66 K/UL
EOSINOPHIL NFR BLD AUTO: 11.7 %
GLUCOSE SERPL-MCNC: 125 MG/DL
HCT VFR BLD CALC: 39.1 %
HGB BLD-MCNC: 12.2 G/DL
IMM GRANULOCYTES NFR BLD AUTO: 0.2 %
LYMPHOCYTES # BLD AUTO: 1.61 K/UL
LYMPHOCYTES NFR BLD AUTO: 28.4 %
MAN DIFF?: NORMAL
MCHC RBC-ENTMCNC: 29.3 PG
MCHC RBC-ENTMCNC: 31.2 GM/DL
MCV RBC AUTO: 94 FL
MONOCYTES # BLD AUTO: 0.47 K/UL
MONOCYTES NFR BLD AUTO: 8.3 %
NEUTROPHILS # BLD AUTO: 2.83 K/UL
NEUTROPHILS NFR BLD AUTO: 50 %
PLATELET # BLD AUTO: 169 K/UL
POTASSIUM SERPL-SCNC: 4.5 MMOL/L
PROT SERPL-MCNC: 6.6 G/DL
RBC # BLD: 4.16 M/UL
RBC # FLD: 14.4 %
SODIUM SERPL-SCNC: 142 MMOL/L
WBC # FLD AUTO: 5.66 K/UL

## 2023-12-04 ENCOUNTER — OFFICE (OUTPATIENT)
Dept: URBAN - METROPOLITAN AREA CLINIC 94 | Facility: CLINIC | Age: 88
Setting detail: OPHTHALMOLOGY
End: 2023-12-04
Payer: MEDICARE

## 2023-12-04 DIAGNOSIS — H35.3211: ICD-10-CM

## 2023-12-04 DIAGNOSIS — H35.373: ICD-10-CM

## 2023-12-04 PROCEDURE — 67028 INJECTION EYE DRUG: CPT | Mod: RT | Performed by: SPECIALIST

## 2023-12-04 PROCEDURE — 92134 CPTRZ OPH DX IMG PST SGM RTA: CPT | Performed by: SPECIALIST

## 2023-12-04 ASSESSMENT — SUPERFICIAL PUNCTATE KERATITIS (SPK)
OD_SPK: 1+
OS_SPK: 1+

## 2023-12-04 ASSESSMENT — CONFRONTATIONAL VISUAL FIELD TEST (CVF)
OD_FINDINGS: FULL
OS_FINDINGS: FULL

## 2023-12-04 ASSESSMENT — REFRACTION_AUTOREFRACTION
OS_SPHERE: UNABLE
OD_SPHERE: UNABLE

## 2023-12-04 ASSESSMENT — CORNEAL DYSTROPHY - POSTERIOR
OD_POSTERIORDYSTROPHY: 4+ FUCHS GUTTATA
OS_POSTERIORDYSTROPHY: 4+ FUCHS GUTTATA

## 2023-12-04 ASSESSMENT — CORNEAL EDEMA CLINICAL DESCRIPTION
OD_CORNEALEDEMA: ABSENT
OS_CORNEALEDEMA: ABSENT

## 2023-12-18 ENCOUNTER — OFFICE (OUTPATIENT)
Dept: URBAN - METROPOLITAN AREA CLINIC 94 | Facility: CLINIC | Age: 88
Setting detail: OPHTHALMOLOGY
End: 2023-12-18
Payer: MEDICARE

## 2023-12-18 DIAGNOSIS — H43.813: ICD-10-CM

## 2023-12-18 DIAGNOSIS — H35.3231: ICD-10-CM

## 2023-12-18 DIAGNOSIS — H35.373: ICD-10-CM

## 2023-12-18 PROCEDURE — 92134 CPTRZ OPH DX IMG PST SGM RTA: CPT | Performed by: SPECIALIST

## 2023-12-18 PROCEDURE — 92012 INTRM OPH EXAM EST PATIENT: CPT | Performed by: SPECIALIST

## 2023-12-18 ASSESSMENT — CONFRONTATIONAL VISUAL FIELD TEST (CVF)
OS_FINDINGS: FULL
OD_FINDINGS: FULL

## 2023-12-18 ASSESSMENT — REFRACTION_AUTOREFRACTION
OS_SPHERE: UNABLE
OD_SPHERE: UNABLE

## 2023-12-18 ASSESSMENT — CORNEAL DYSTROPHY - POSTERIOR
OD_POSTERIORDYSTROPHY: 4+ FUCHS GUTTATA
OS_POSTERIORDYSTROPHY: 4+ FUCHS GUTTATA

## 2023-12-18 ASSESSMENT — SUPERFICIAL PUNCTATE KERATITIS (SPK)
OS_SPK: 1+
OD_SPK: 1+

## 2023-12-18 ASSESSMENT — CORNEAL EDEMA CLINICAL DESCRIPTION
OS_CORNEALEDEMA: ABSENT
OD_CORNEALEDEMA: ABSENT

## 2024-01-22 ENCOUNTER — OFFICE (OUTPATIENT)
Dept: URBAN - METROPOLITAN AREA CLINIC 94 | Facility: CLINIC | Age: 89
Setting detail: OPHTHALMOLOGY
End: 2024-01-22
Payer: MEDICARE

## 2024-01-22 DIAGNOSIS — H35.3231: ICD-10-CM

## 2024-01-22 DIAGNOSIS — H35.373: ICD-10-CM

## 2024-01-22 DIAGNOSIS — H43.813: ICD-10-CM

## 2024-01-22 PROCEDURE — 92012 INTRM OPH EXAM EST PATIENT: CPT | Performed by: SPECIALIST

## 2024-01-22 PROCEDURE — 92134 CPTRZ OPH DX IMG PST SGM RTA: CPT | Performed by: SPECIALIST

## 2024-01-22 ASSESSMENT — REFRACTION_AUTOREFRACTION
OS_SPHERE: UNABLE
OD_SPHERE: UNABLE

## 2024-01-22 ASSESSMENT — CORNEAL EDEMA CLINICAL DESCRIPTION
OD_CORNEALEDEMA: ABSENT
OS_CORNEALEDEMA: ABSENT

## 2024-01-22 ASSESSMENT — SUPERFICIAL PUNCTATE KERATITIS (SPK)
OS_SPK: 1+
OD_SPK: 1+

## 2024-01-22 ASSESSMENT — CORNEAL DYSTROPHY - POSTERIOR
OD_POSTERIORDYSTROPHY: 4+ FUCHS GUTTATA
OS_POSTERIORDYSTROPHY: 4+ FUCHS GUTTATA

## 2024-01-22 ASSESSMENT — CONFRONTATIONAL VISUAL FIELD TEST (CVF)
OD_FINDINGS: FULL
OS_FINDINGS: FULL

## 2024-01-30 RX ORDER — METOPROLOL TARTRATE 25 MG/1
25 TABLET, FILM COATED ORAL
Qty: 45 | Refills: 3 | Status: ACTIVE | COMMUNITY
Start: 1900-01-01 | End: 1900-01-01

## 2024-01-31 NOTE — OCCUPATIONAL THERAPY INITIAL EVALUATION ADULT - VISUAL ACUITY
RELAY:  [facility] [place] TO SCHEDULE A   Return in about 5 months (around 6/30/2024) for Welcome to Medicare   
no vision c/o offered

## 2024-02-13 NOTE — BRIEF OPERATIVE NOTE - PRE-OP
CT imaging did show possible left retroperitoneal soft tissue mass  Will need to discuss if patient and family willing to further evaluate given patient age  Discussed findings with family and patient, they want to continue conservative measures at this time   <<-----Click on this checkbox to enter Pre-Op Dx

## 2024-02-24 ENCOUNTER — NON-APPOINTMENT (OUTPATIENT)
Age: 89
End: 2024-02-24

## 2024-02-25 ENCOUNTER — EMERGENCY (EMERGENCY)
Facility: HOSPITAL | Age: 89
LOS: 1 days | Discharge: HOME | End: 2024-02-25
Attending: EMERGENCY MEDICINE
Payer: MEDICARE

## 2024-02-25 VITALS
HEART RATE: 96 BPM | OXYGEN SATURATION: 94 % | DIASTOLIC BLOOD PRESSURE: 77 MMHG | HEIGHT: 67 IN | RESPIRATION RATE: 20 BRPM | TEMPERATURE: 98 F | WEIGHT: 160.06 LBS | SYSTOLIC BLOOD PRESSURE: 173 MMHG

## 2024-02-25 DIAGNOSIS — Z95.2 PRESENCE OF PROSTHETIC HEART VALVE: Chronic | ICD-10-CM

## 2024-02-25 DIAGNOSIS — Z98.49 CATARACT EXTRACTION STATUS, UNSPECIFIED EYE: Chronic | ICD-10-CM

## 2024-02-25 PROCEDURE — 99284 EMERGENCY DEPT VISIT MOD MDM: CPT

## 2024-02-25 PROCEDURE — 99283 EMERGENCY DEPT VISIT LOW MDM: CPT | Mod: 25

## 2024-02-25 PROCEDURE — 71045 X-RAY EXAM CHEST 1 VIEW: CPT | Mod: 26

## 2024-02-25 PROCEDURE — 71045 X-RAY EXAM CHEST 1 VIEW: CPT

## 2024-02-25 NOTE — ED PROVIDER NOTE - PHYSICAL EXAMINATION
Gen: AAOx2, NAD  HEENT: Normocephalic atraumatic. EOMI. No scleral icterus. Moist mucus membranes.  CV: RRR. Audible S1 and S2. No murmurs. 2+ radial and PT pulses   Pulm: Mild scattered wheezes. No accessory muscle use or respiratory distress.  Abdomen: soft, normoactive BS, non distended, nontender, no rebound, no guarding  Musculoskeletal:  Moving all extremities equally. No gross deformity. No tenderness to palpation.  Skin: No rashes or lesions. Warm.  Neurologic: No focal neurological deficits. CN II-XII grossly intact.  Psych: Appropriate mood and affect. Cooperative.

## 2024-02-25 NOTE — ED PROVIDER NOTE - OBJECTIVE STATEMENT
Patient is a 89yo M with PMHx of dementia and HTN who presents to the ED complaining of cough/food bolus impaction. Son states he was eating breakfast this morning and tried to eat some toast and started coughing a lot. Was unable to tolerate any PO intake afterwards. Drooling a lot of mucus. Now feels better. No longer drooling and PO challenged with water with no issue. Otherwise, prior to this episode, patient had some sneezing and congestion since yesterday. No cough prior to the toast and no fever, chills, nausea, vomiting, chest pain, shortness of breath.

## 2024-02-25 NOTE — ED PROVIDER NOTE - CARE PROVIDER_API CALL
Shira Boyce  Gastroenterology  [address], [address]  [place] [address]  Phone: [phone]  Fax: [phone]  Follow Up Time: Routine

## 2024-02-25 NOTE — ED PROVIDER NOTE - CLINICAL SUMMARY MEDICAL DECISION MAKING FREE TEXT BOX
Patient is a 89yo M with PMHx of dementia and HTN who presents to the ED complaining of cough/food bolus impaction, now resolved. PO challenged water with no issue or difficulty swallowing. Will get CXR and dc.

## 2024-02-25 NOTE — ED ADULT TRIAGE NOTE - CHIEF COMPLAINT QUOTE
BIBA from   c/o difficulty swallowing . Pt had a pice of toast today and was  " choking on it " - poor historian. Wet cough noted. Son at the bedside stating patient  developed cold yesterday

## 2024-02-25 NOTE — ED PROVIDER NOTE - ATTENDING CONTRIBUTION TO CARE
No
I personally saw the patient with the resident, and completed the key components of the history and physical exam. I then discussed the management plan with the resident.    89-year-old male with past medical history of dementia and hypertension presents after he ate some toast this morning and started coughing, was unable to tolerate p.o. at that time.  Now feeling better and able to tolerate p.o.  Per son, this is never happened before, he has had some difficulty with swallowing, but is usually very careful with food.  Patient has no complaints at this time.      NAD, well-appearing, no respiratory distress, no coughing observed, patient able to tolerate p.o., ABD soft, nontender, nondistended.    X-ray shows elevated hemidiaphragm, more significant on the right, but not very dissimilar from prior x-rays.    Reassurance provided, recommended follow-up with PMD and GI as needed.

## 2024-02-25 NOTE — ED PROVIDER NOTE - PATIENT PORTAL LINK FT
You can access the FollowMyHealth Patient Portal offered by [facility] by registering at the following website: [email or web address]. By joining [employer]’s FollowMyHealth portal, you will also be able to view your health information using other applications (apps) compatible with our system.

## 2024-03-04 ENCOUNTER — OFFICE (OUTPATIENT)
Dept: URBAN - METROPOLITAN AREA CLINIC 94 | Facility: CLINIC | Age: 89
Setting detail: OPHTHALMOLOGY
End: 2024-03-04
Payer: MEDICARE

## 2024-03-04 DIAGNOSIS — H35.3231: ICD-10-CM

## 2024-03-04 DIAGNOSIS — H35.373: ICD-10-CM

## 2024-03-04 DIAGNOSIS — H35.3211: ICD-10-CM

## 2024-03-04 PROCEDURE — 67028 INJECTION EYE DRUG: CPT | Mod: RT | Performed by: SPECIALIST

## 2024-03-04 PROCEDURE — 92134 CPTRZ OPH DX IMG PST SGM RTA: CPT | Performed by: SPECIALIST

## 2024-03-07 ENCOUNTER — APPOINTMENT (OUTPATIENT)
Dept: OTOLARYNGOLOGY | Facility: CLINIC | Age: 89
End: 2024-03-07
Payer: MEDICARE

## 2024-03-07 PROCEDURE — 92612 ENDOSCOPY SWALLOW (FEES) VID: CPT | Mod: GN

## 2024-03-13 ENCOUNTER — APPOINTMENT (OUTPATIENT)
Dept: OTOLARYNGOLOGY | Facility: CLINIC | Age: 89
End: 2024-03-13
Payer: MEDICARE

## 2024-03-13 PROCEDURE — 92526 ORAL FUNCTION THERAPY: CPT | Mod: GN

## 2024-03-25 ENCOUNTER — OFFICE (OUTPATIENT)
Dept: URBAN - METROPOLITAN AREA CLINIC 94 | Facility: CLINIC | Age: 89
Setting detail: OPHTHALMOLOGY
End: 2024-03-25
Payer: MEDICARE

## 2024-03-25 DIAGNOSIS — H35.373: ICD-10-CM

## 2024-03-25 DIAGNOSIS — H35.3231: ICD-10-CM

## 2024-03-25 DIAGNOSIS — H43.813: ICD-10-CM

## 2024-03-25 PROCEDURE — 92012 INTRM OPH EXAM EST PATIENT: CPT | Performed by: SPECIALIST

## 2024-03-25 PROCEDURE — 92134 CPTRZ OPH DX IMG PST SGM RTA: CPT | Performed by: SPECIALIST

## 2024-04-03 ENCOUNTER — APPOINTMENT (OUTPATIENT)
Dept: OTOLARYNGOLOGY | Facility: CLINIC | Age: 89
End: 2024-04-03

## 2024-04-29 ENCOUNTER — OFFICE (OUTPATIENT)
Dept: URBAN - METROPOLITAN AREA CLINIC 94 | Facility: CLINIC | Age: 89
Setting detail: OPHTHALMOLOGY
End: 2024-04-29
Payer: MEDICARE

## 2024-04-29 DIAGNOSIS — H43.813: ICD-10-CM

## 2024-04-29 DIAGNOSIS — H35.373: ICD-10-CM

## 2024-04-29 DIAGNOSIS — H35.3231: ICD-10-CM

## 2024-04-29 PROCEDURE — 92134 CPTRZ OPH DX IMG PST SGM RTA: CPT | Performed by: SPECIALIST

## 2024-04-29 PROCEDURE — 92012 INTRM OPH EXAM EST PATIENT: CPT | Performed by: SPECIALIST

## 2024-07-02 ENCOUNTER — APPOINTMENT (OUTPATIENT)
Dept: INTERNAL MEDICINE | Facility: CLINIC | Age: 89
End: 2024-07-02

## 2024-07-09 ENCOUNTER — APPOINTMENT (OUTPATIENT)
Dept: INTERNAL MEDICINE | Facility: CLINIC | Age: 89
End: 2024-07-09
Payer: MEDICARE

## 2024-07-09 VITALS
DIASTOLIC BLOOD PRESSURE: 50 MMHG | BODY MASS INDEX: 23.3 KG/M2 | HEIGHT: 66 IN | WEIGHT: 145 LBS | SYSTOLIC BLOOD PRESSURE: 125 MMHG

## 2024-07-09 DIAGNOSIS — I50.30 UNSPECIFIED DIASTOLIC (CONGESTIVE) HEART FAILURE: ICD-10-CM

## 2024-07-09 DIAGNOSIS — G30.1 ALZHEIMER'S DISEASE WITH LATE ONSET: ICD-10-CM

## 2024-07-09 DIAGNOSIS — Z95.2 PRESENCE OF PROSTHETIC HEART VALVE: ICD-10-CM

## 2024-07-09 DIAGNOSIS — I10 ESSENTIAL (PRIMARY) HYPERTENSION: ICD-10-CM

## 2024-07-09 DIAGNOSIS — F02.80 ALZHEIMER'S DISEASE WITH LATE ONSET: ICD-10-CM

## 2024-07-09 PROCEDURE — G2211 COMPLEX E/M VISIT ADD ON: CPT

## 2024-07-09 PROCEDURE — 99214 OFFICE O/P EST MOD 30 MIN: CPT

## 2024-07-09 RX ORDER — RESPIRATORY SYNCYTIAL VIRUS VACCINE 120MCG/0.5
120 KIT INTRAMUSCULAR
Qty: 1 | Refills: 0 | Status: ACTIVE | COMMUNITY
Start: 2024-07-09 | End: 1900-01-01

## 2024-07-12 ENCOUNTER — OFFICE (OUTPATIENT)
Dept: URBAN - METROPOLITAN AREA CLINIC 104 | Facility: CLINIC | Age: 89
Setting detail: OPHTHALMOLOGY
End: 2024-07-12
Payer: MEDICARE

## 2024-07-12 DIAGNOSIS — H35.373: ICD-10-CM

## 2024-07-12 DIAGNOSIS — H35.3211: ICD-10-CM

## 2024-07-12 PROCEDURE — 92134 CPTRZ OPH DX IMG PST SGM RTA: CPT | Performed by: SPECIALIST

## 2024-07-12 PROCEDURE — 67028 INJECTION EYE DRUG: CPT | Mod: RT | Performed by: SPECIALIST

## 2024-07-12 ASSESSMENT — CONFRONTATIONAL VISUAL FIELD TEST (CVF)
OD_FINDINGS: FULL
OS_FINDINGS: FULL

## 2024-08-10 NOTE — REVIEW OF SYSTEMS
Pt afebrile, tolerating room air and with stable VS.    Pt still with intermittent cough and congestion. Per pt, symptoms are not worse than when admitted.    Pt still on 5 day wait period for IP placement.    Acting appropriately with staff, still ok without sitter at bedside.      Problem: Patient/Family Goals  Goal: Patient/Family Long Term Goal  Description: Patient's Long Term Goal: to go to IP    Interventions:  - complete 5 day period in hospital  -treat symptoms as needed  - See additional Care Plan goals for specific interventions  Outcome: Progressing  Goal: Patient/Family Short Term Goal  Description: Patient's Short Term Goal: resolve covid symptoms    Interventions:   - utilize prns as ordered  -encourage po intake for hydration  - See additional Care Plan goals for specific interventions  Outcome: Progressing      [Nail Changes] : no nail changes [Skin Rash] : no skin rash [Confusion] : confusion [Unsteady Walk] : ataxia [Memory Loss] : memory loss [Negative] : Heme/Lymph

## 2025-01-21 ENCOUNTER — APPOINTMENT (OUTPATIENT)
Dept: INTERNAL MEDICINE | Facility: CLINIC | Age: 89
End: 2025-01-21

## 2025-04-05 NOTE — PROGRESS NOTE ADULT - PROBLEM SELECTOR PLAN 1
Humphrey Martin(Attending) Maintain waddell. Very difficult placement. Do not remove with urology permission

## 2025-06-25 NOTE — ED ADULT TRIAGE NOTE - ESI TRIAGE ACUITY LEVEL, MLM
3 Body Location Override (Optional - Billing Will Still Be Based On Selected Body Map Location If Applicable): left medial anterior deltoid 1/20/21 MOHS with KISHOR Detail Level: Detailed Size Of Lesion In Cm (Optional): 0 Size Of Lesion: 3x3mm Size Of Lesion: 5x3mm Size Of Lesion: 3x4mm